# Patient Record
Sex: FEMALE | Race: WHITE | NOT HISPANIC OR LATINO | Employment: FULL TIME | URBAN - NONMETROPOLITAN AREA
[De-identification: names, ages, dates, MRNs, and addresses within clinical notes are randomized per-mention and may not be internally consistent; named-entity substitution may affect disease eponyms.]

---

## 2023-04-24 DIAGNOSIS — E87.5 HYPERKALEMIA: ICD-10-CM

## 2023-04-24 RX ORDER — POTASSIUM CHLORIDE 1.5 G/1.58G
20 POWDER, FOR SOLUTION ORAL DAILY
COMMUNITY
End: 2023-04-24 | Stop reason: SDUPTHER

## 2023-04-25 RX ORDER — POTASSIUM CHLORIDE 1.5 G/1.58G
20 POWDER, FOR SOLUTION ORAL DAILY
Qty: 30 EACH | Refills: 11 | Status: SHIPPED | OUTPATIENT
Start: 2023-04-25 | End: 2023-06-19 | Stop reason: SDUPTHER

## 2023-05-04 DIAGNOSIS — R60.0 BILATERAL LEG EDEMA: Primary | ICD-10-CM

## 2023-05-04 PROBLEM — M25.50 POLYARTHRALGIA: Status: ACTIVE | Noted: 2023-05-04

## 2023-05-04 PROBLEM — K21.9 GERD (GASTROESOPHAGEAL REFLUX DISEASE): Status: ACTIVE | Noted: 2023-05-04

## 2023-05-04 PROBLEM — E83.41 HYPERMAGNESEMIA: Status: ACTIVE | Noted: 2023-05-04

## 2023-05-04 PROBLEM — R06.02 SOB (SHORTNESS OF BREATH) ON EXERTION: Status: ACTIVE | Noted: 2023-05-04

## 2023-05-04 PROBLEM — I10 HYPERTENSION: Status: ACTIVE | Noted: 2023-05-04

## 2023-05-04 PROBLEM — G47.33 OBSTRUCTIVE SLEEP APNEA, ADULT: Status: ACTIVE | Noted: 2023-05-04

## 2023-05-04 PROBLEM — M75.41 IMPINGEMENT SYNDROME OF RIGHT SHOULDER: Status: ACTIVE | Noted: 2023-05-04

## 2023-05-04 PROBLEM — E87.6 DIURETIC-INDUCED HYPOKALEMIA: Status: ACTIVE | Noted: 2023-05-04

## 2023-05-04 PROBLEM — T50.2X5A DIURETIC-INDUCED HYPOKALEMIA: Status: ACTIVE | Noted: 2023-05-04

## 2023-05-04 PROBLEM — E78.00 HYPERCHOLESTEREMIA: Status: ACTIVE | Noted: 2023-05-04

## 2023-05-04 PROBLEM — G89.29 CHRONIC PAIN OF LEFT KNEE: Status: ACTIVE | Noted: 2023-05-04

## 2023-05-04 PROBLEM — J44.9 COPD (CHRONIC OBSTRUCTIVE PULMONARY DISEASE) (MULTI): Status: ACTIVE | Noted: 2023-05-04

## 2023-05-04 PROBLEM — R91.1 LUNG NODULE: Status: ACTIVE | Noted: 2023-05-04

## 2023-05-04 PROBLEM — M19.042 DEGENERATIVE ARTHRITIS OF METACARPOPHALANGEAL JOINT OF LEFT THUMB: Status: ACTIVE | Noted: 2023-05-04

## 2023-05-04 PROBLEM — L25.9 CONTACT DERMATITIS: Status: ACTIVE | Noted: 2023-05-04

## 2023-05-04 PROBLEM — M25.562 CHRONIC PAIN OF LEFT KNEE: Status: ACTIVE | Noted: 2023-05-04

## 2023-05-04 PROBLEM — M19.049 CMC ARTHRITIS: Status: ACTIVE | Noted: 2023-05-04

## 2023-05-04 PROBLEM — M77.00 MEDIAL EPICONDYLITIS: Status: ACTIVE | Noted: 2023-05-04

## 2023-05-04 PROBLEM — E87.5 HYPERKALEMIA: Status: ACTIVE | Noted: 2023-05-04

## 2023-05-04 RX ORDER — TRIAMTERENE/HYDROCHLOROTHIAZID 37.5-25 MG
1 TABLET ORAL DAILY
COMMUNITY
Start: 2019-11-21 | End: 2023-05-04 | Stop reason: SDUPTHER

## 2023-05-04 RX ORDER — TRIAMTERENE/HYDROCHLOROTHIAZID 37.5-25 MG
1 TABLET ORAL DAILY
Qty: 90 TABLET | Refills: 0 | Status: SHIPPED | OUTPATIENT
Start: 2023-05-04 | End: 2023-06-19 | Stop reason: SDUPTHER

## 2023-05-11 RX ORDER — BUDESONIDE, GLYCOPYRROLATE, AND FORMOTEROL FUMARATE 160; 9; 4.8 UG/1; UG/1; UG/1
2 AEROSOL, METERED RESPIRATORY (INHALATION) 2 TIMES DAILY
COMMUNITY
Start: 2023-04-24 | End: 2023-10-27 | Stop reason: SDUPTHER

## 2023-05-11 RX ORDER — OMEPRAZOLE 20 MG/1
1 TABLET, DELAYED RELEASE ORAL DAILY
COMMUNITY
Start: 2019-11-21

## 2023-05-11 RX ORDER — CHOLECALCIFEROL (VITAMIN D3) 25 MCG
1 TABLET ORAL DAILY
COMMUNITY

## 2023-05-11 RX ORDER — NAPROXEN SODIUM 220 MG/1
81 TABLET, FILM COATED ORAL DAILY
COMMUNITY
Start: 2023-01-12 | End: 2023-06-19 | Stop reason: SDUPTHER

## 2023-05-11 RX ORDER — ALBUTEROL SULFATE 90 UG/1
2 INHALANT RESPIRATORY (INHALATION) EVERY 6 HOURS PRN
COMMUNITY
Start: 2021-10-19

## 2023-05-11 RX ORDER — METOPROLOL SUCCINATE 25 MG/1
1 TABLET, EXTENDED RELEASE ORAL DAILY
COMMUNITY
Start: 2021-09-07 | End: 2023-05-18 | Stop reason: SDUPTHER

## 2023-05-11 RX ORDER — LOSARTAN POTASSIUM 25 MG/1
1 TABLET ORAL DAILY
COMMUNITY
Start: 2021-11-10 | End: 2023-05-18 | Stop reason: SDUPTHER

## 2023-05-11 RX ORDER — ISOSORBIDE MONONITRATE 30 MG/1
1 TABLET, EXTENDED RELEASE ORAL DAILY
COMMUNITY
Start: 2023-04-18 | End: 2024-04-30 | Stop reason: SDUPTHER

## 2023-05-12 LAB
ALANINE AMINOTRANSFERASE (SGPT) (U/L) IN SER/PLAS: 24 U/L (ref 7–45)
ALBUMIN (G/DL) IN SER/PLAS: 3.6 G/DL (ref 3.4–5)
ALKALINE PHOSPHATASE (U/L) IN SER/PLAS: 95 U/L (ref 33–136)
ANION GAP IN SER/PLAS: 9 MMOL/L (ref 10–20)
ASPARTATE AMINOTRANSFERASE (SGOT) (U/L) IN SER/PLAS: 25 U/L (ref 9–39)
BASOPHILS (10*3/UL) IN BLOOD BY AUTOMATED COUNT: 0.03 X10E9/L (ref 0–0.1)
BASOPHILS/100 LEUKOCYTES IN BLOOD BY AUTOMATED COUNT: 0.6 % (ref 0–2)
BILIRUBIN TOTAL (MG/DL) IN SER/PLAS: 0.4 MG/DL (ref 0–1.2)
CALCIUM (MG/DL) IN SER/PLAS: 9.4 MG/DL (ref 8.6–10.3)
CARBON DIOXIDE, TOTAL (MMOL/L) IN SER/PLAS: 30 MMOL/L (ref 21–32)
CHLORIDE (MMOL/L) IN SER/PLAS: 105 MMOL/L (ref 98–107)
CHOLESTEROL (MG/DL) IN SER/PLAS: 163 MG/DL (ref 0–199)
CHOLESTEROL IN HDL (MG/DL) IN SER/PLAS: 70 MG/DL
CHOLESTEROL/HDL RATIO: 2.3
CREATININE (MG/DL) IN SER/PLAS: 0.94 MG/DL (ref 0.5–1.05)
EOSINOPHILS (10*3/UL) IN BLOOD BY AUTOMATED COUNT: 0.17 X10E9/L (ref 0–0.7)
EOSINOPHILS/100 LEUKOCYTES IN BLOOD BY AUTOMATED COUNT: 3.5 % (ref 0–6)
ERYTHROCYTE DISTRIBUTION WIDTH (RATIO) BY AUTOMATED COUNT: 13.8 % (ref 11.5–14.5)
ERYTHROCYTE MEAN CORPUSCULAR HEMOGLOBIN CONCENTRATION (G/DL) BY AUTOMATED: 30.7 G/DL (ref 32–36)
ERYTHROCYTE MEAN CORPUSCULAR VOLUME (FL) BY AUTOMATED COUNT: 96 FL (ref 80–100)
ERYTHROCYTES (10*6/UL) IN BLOOD BY AUTOMATED COUNT: 4.3 X10E12/L (ref 4–5.2)
FERRITIN (UG/LL) IN SER/PLAS: 58 UG/L (ref 8–150)
GFR FEMALE: 69 ML/MIN/1.73M2
GLUCOSE (MG/DL) IN SER/PLAS: 87 MG/DL (ref 74–99)
HEMATOCRIT (%) IN BLOOD BY AUTOMATED COUNT: 41.1 % (ref 36–46)
HEMOGLOBIN (G/DL) IN BLOOD: 12.6 G/DL (ref 12–16)
IMMATURE GRANULOCYTES/100 LEUKOCYTES IN BLOOD BY AUTOMATED COUNT: 0.4 % (ref 0–0.9)
IRON (UG/DL) IN SER/PLAS: 82 UG/DL (ref 35–150)
IRON BINDING CAPACITY (UG/DL) IN SER/PLAS: 321 UG/DL (ref 240–445)
IRON SATURATION (%) IN SER/PLAS: 26 % (ref 25–45)
LDL: 84 MG/DL (ref 0–99)
LEUKOCYTES (10*3/UL) IN BLOOD BY AUTOMATED COUNT: 4.9 X10E9/L (ref 4.4–11.3)
LYMPHOCYTES (10*3/UL) IN BLOOD BY AUTOMATED COUNT: 1.08 X10E9/L (ref 1.2–4.8)
LYMPHOCYTES/100 LEUKOCYTES IN BLOOD BY AUTOMATED COUNT: 22 % (ref 13–44)
MAGNESIUM (MG/DL) IN SER/PLAS: 2.02 MG/DL (ref 1.6–2.4)
MONOCYTES (10*3/UL) IN BLOOD BY AUTOMATED COUNT: 0.45 X10E9/L (ref 0.1–1)
MONOCYTES/100 LEUKOCYTES IN BLOOD BY AUTOMATED COUNT: 9.2 % (ref 2–10)
NEUTROPHILS (10*3/UL) IN BLOOD BY AUTOMATED COUNT: 3.15 X10E9/L (ref 1.2–7.7)
NEUTROPHILS/100 LEUKOCYTES IN BLOOD BY AUTOMATED COUNT: 64.3 % (ref 40–80)
PLATELETS (10*3/UL) IN BLOOD AUTOMATED COUNT: 264 X10E9/L (ref 150–450)
POTASSIUM (MMOL/L) IN SER/PLAS: 3.7 MMOL/L (ref 3.5–5.3)
PROTEIN TOTAL: 6 G/DL (ref 6.4–8.2)
SODIUM (MMOL/L) IN SER/PLAS: 140 MMOL/L (ref 136–145)
THYROTROPIN (MIU/L) IN SER/PLAS BY DETECTION LIMIT <= 0.05 MIU/L: 3.04 MIU/L (ref 0.44–3.98)
TRIGLYCERIDE (MG/DL) IN SER/PLAS: 43 MG/DL (ref 0–149)
UREA NITROGEN (MG/DL) IN SER/PLAS: 20 MG/DL (ref 6–23)
VLDL: 9 MG/DL (ref 0–40)

## 2023-05-18 ENCOUNTER — OFFICE VISIT (OUTPATIENT)
Dept: PRIMARY CARE | Facility: CLINIC | Age: 63
End: 2023-05-18
Payer: COMMERCIAL

## 2023-05-18 VITALS
HEART RATE: 60 BPM | HEIGHT: 60 IN | BODY MASS INDEX: 32.2 KG/M2 | SYSTOLIC BLOOD PRESSURE: 144 MMHG | DIASTOLIC BLOOD PRESSURE: 87 MMHG | WEIGHT: 164 LBS

## 2023-05-18 DIAGNOSIS — Z78.0 POST-MENOPAUSAL: ICD-10-CM

## 2023-05-18 DIAGNOSIS — M54.50 CHRONIC BILATERAL LOW BACK PAIN WITHOUT SCIATICA: ICD-10-CM

## 2023-05-18 DIAGNOSIS — K21.9 GASTROESOPHAGEAL REFLUX DISEASE WITHOUT ESOPHAGITIS: ICD-10-CM

## 2023-05-18 DIAGNOSIS — M54.9 MID BACK PAIN: ICD-10-CM

## 2023-05-18 DIAGNOSIS — E78.00 HYPERCHOLESTEREMIA: ICD-10-CM

## 2023-05-18 DIAGNOSIS — I10 PRIMARY HYPERTENSION: Primary | ICD-10-CM

## 2023-05-18 DIAGNOSIS — T50.2X5A DIURETIC-INDUCED HYPOKALEMIA: ICD-10-CM

## 2023-05-18 DIAGNOSIS — E87.6 DIURETIC-INDUCED HYPOKALEMIA: ICD-10-CM

## 2023-05-18 DIAGNOSIS — M25.50 POLYARTHRALGIA: ICD-10-CM

## 2023-05-18 DIAGNOSIS — M54.2 CERVICALGIA: ICD-10-CM

## 2023-05-18 DIAGNOSIS — J43.2 CENTRILOBULAR EMPHYSEMA (MULTI): ICD-10-CM

## 2023-05-18 DIAGNOSIS — G89.29 CHRONIC BILATERAL LOW BACK PAIN WITHOUT SCIATICA: ICD-10-CM

## 2023-05-18 DIAGNOSIS — E83.41 HYPERMAGNESEMIA: ICD-10-CM

## 2023-05-18 DIAGNOSIS — Z12.31 ENCOUNTER FOR SCREENING MAMMOGRAM FOR BREAST CANCER: ICD-10-CM

## 2023-05-18 PROCEDURE — 3079F DIAST BP 80-89 MM HG: CPT | Performed by: PHYSICIAN ASSISTANT

## 2023-05-18 PROCEDURE — 99214 OFFICE O/P EST MOD 30 MIN: CPT | Performed by: PHYSICIAN ASSISTANT

## 2023-05-18 PROCEDURE — 1036F TOBACCO NON-USER: CPT | Performed by: PHYSICIAN ASSISTANT

## 2023-05-18 PROCEDURE — 3077F SYST BP >= 140 MM HG: CPT | Performed by: PHYSICIAN ASSISTANT

## 2023-05-18 RX ORDER — METOPROLOL SUCCINATE 25 MG/1
25 TABLET, EXTENDED RELEASE ORAL DAILY
Qty: 90 TABLET | Refills: 3 | Status: SHIPPED | OUTPATIENT
Start: 2023-05-18 | End: 2023-06-19 | Stop reason: SDUPTHER

## 2023-05-18 RX ORDER — LOSARTAN POTASSIUM 25 MG/1
25 TABLET ORAL DAILY
Qty: 90 TABLET | Refills: 3 | Status: SHIPPED | OUTPATIENT
Start: 2023-05-18 | End: 2023-06-19 | Stop reason: SDUPTHER

## 2023-05-18 ASSESSMENT — PATIENT HEALTH QUESTIONNAIRE - PHQ9
SUM OF ALL RESPONSES TO PHQ9 QUESTIONS 1 AND 2: 0
1. LITTLE INTEREST OR PLEASURE IN DOING THINGS: NOT AT ALL
2. FEELING DOWN, DEPRESSED OR HOPELESS: NOT AT ALL

## 2023-05-18 ASSESSMENT — ENCOUNTER SYMPTOMS
CONSTIPATION: 0
DIZZINESS: 0
CHILLS: 0
ARTHRALGIAS: 1
CHEST TIGHTNESS: 0
DIARRHEA: 0
NUMBNESS: 0
FLANK PAIN: 0
NAUSEA: 0
RHINORRHEA: 0
FATIGUE: 1
BRUISES/BLEEDS EASILY: 0
COUGH: 0
HEADACHES: 0
EYE REDNESS: 0
NECK PAIN: 0
FEVER: 0
WOUND: 0
BACK PAIN: 1
EYE DISCHARGE: 0
PALPITATIONS: 0
SLEEP DISTURBANCE: 0
FREQUENCY: 0
ABDOMINAL PAIN: 0
WHEEZING: 0
CONFUSION: 0
SHORTNESS OF BREATH: 0
VOMITING: 0
TREMORS: 0
SINUS PAIN: 0
SORE THROAT: 0
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
## 2023-06-07 ENCOUNTER — OFFICE VISIT (OUTPATIENT)
Dept: PRIMARY CARE | Facility: CLINIC | Age: 63
End: 2023-06-07
Payer: COMMERCIAL

## 2023-06-07 VITALS
WEIGHT: 165 LBS | BODY MASS INDEX: 32.39 KG/M2 | HEART RATE: 64 BPM | HEIGHT: 60 IN | DIASTOLIC BLOOD PRESSURE: 76 MMHG | SYSTOLIC BLOOD PRESSURE: 138 MMHG

## 2023-06-07 DIAGNOSIS — J43.2 CENTRILOBULAR EMPHYSEMA (MULTI): ICD-10-CM

## 2023-06-07 DIAGNOSIS — M54.2 CERVICALGIA: ICD-10-CM

## 2023-06-07 DIAGNOSIS — M54.9 MID BACK PAIN: ICD-10-CM

## 2023-06-07 DIAGNOSIS — M50.30 DEGENERATIVE DISC DISEASE, CERVICAL: ICD-10-CM

## 2023-06-07 DIAGNOSIS — R91.1 LUNG NODULE: ICD-10-CM

## 2023-06-07 DIAGNOSIS — I10 PRIMARY HYPERTENSION: ICD-10-CM

## 2023-06-07 DIAGNOSIS — R06.02 SOB (SHORTNESS OF BREATH) ON EXERTION: ICD-10-CM

## 2023-06-07 DIAGNOSIS — M51.34 DEGENERATIVE DISC DISEASE, THORACIC: ICD-10-CM

## 2023-06-07 DIAGNOSIS — M51.37 DEGENERATIVE DISC DISEASE AT L5-S1 LEVEL: Primary | ICD-10-CM

## 2023-06-07 PROBLEM — R25.2 MUSCLE CRAMP: Status: ACTIVE | Noted: 2023-06-07

## 2023-06-07 PROBLEM — R00.2 HEART PALPITATIONS: Status: ACTIVE | Noted: 2023-06-07

## 2023-06-07 PROBLEM — R53.83 FATIGUE: Status: ACTIVE | Noted: 2023-06-07

## 2023-06-07 PROBLEM — M51.379 DEGENERATIVE DISC DISEASE AT L5-S1 LEVEL: Status: ACTIVE | Noted: 2023-06-07

## 2023-06-07 PROCEDURE — 99214 OFFICE O/P EST MOD 30 MIN: CPT | Performed by: PHYSICIAN ASSISTANT

## 2023-06-07 PROCEDURE — 1036F TOBACCO NON-USER: CPT | Performed by: PHYSICIAN ASSISTANT

## 2023-06-07 PROCEDURE — 3078F DIAST BP <80 MM HG: CPT | Performed by: PHYSICIAN ASSISTANT

## 2023-06-07 PROCEDURE — 3075F SYST BP GE 130 - 139MM HG: CPT | Performed by: PHYSICIAN ASSISTANT

## 2023-06-07 RX ORDER — ATORVASTATIN CALCIUM 40 MG/1
40 TABLET, FILM COATED ORAL DAILY
COMMUNITY
End: 2024-01-04 | Stop reason: SDUPTHER

## 2023-06-07 ASSESSMENT — ENCOUNTER SYMPTOMS
FEVER: 0
EYE REDNESS: 0
BRUISES/BLEEDS EASILY: 0
NECK PAIN: 0
WHEEZING: 0
PALPITATIONS: 0
TREMORS: 0
HEADACHES: 0
SHORTNESS OF BREATH: 1
CHEST TIGHTNESS: 1
NUMBNESS: 0
ARTHRALGIAS: 1
NAUSEA: 0
CONSTIPATION: 0
VOMITING: 0
SORE THROAT: 0
DIZZINESS: 0
SLEEP DISTURBANCE: 0
CHILLS: 0
WOUND: 0
FATIGUE: 1
EYE DISCHARGE: 0
CONFUSION: 0
SINUS PAIN: 0
ABDOMINAL PAIN: 0
COUGH: 0
DIARRHEA: 0
BACK PAIN: 1
FLANK PAIN: 0
RHINORRHEA: 0
FREQUENCY: 0
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
## 2023-06-19 DIAGNOSIS — J43.9 PULMONARY EMPHYSEMA, UNSPECIFIED EMPHYSEMA TYPE (MULTI): Primary | ICD-10-CM

## 2023-06-19 DIAGNOSIS — R60.0 BILATERAL LEG EDEMA: ICD-10-CM

## 2023-06-19 DIAGNOSIS — E87.5 HYPERKALEMIA: ICD-10-CM

## 2023-06-19 DIAGNOSIS — I10 PRIMARY HYPERTENSION: ICD-10-CM

## 2023-06-19 DIAGNOSIS — Z00.00 HEALTHCARE MAINTENANCE: ICD-10-CM

## 2023-06-19 RX ORDER — TRIAMTERENE/HYDROCHLOROTHIAZID 37.5-25 MG
1 TABLET ORAL DAILY
Qty: 90 TABLET | Refills: 3 | Status: SHIPPED | OUTPATIENT
Start: 2023-06-19 | End: 2024-03-18

## 2023-06-19 RX ORDER — LOSARTAN POTASSIUM 25 MG/1
25 TABLET ORAL DAILY
Qty: 90 TABLET | Refills: 3 | Status: SHIPPED | OUTPATIENT
Start: 2023-06-19 | End: 2024-03-14

## 2023-06-19 RX ORDER — MONTELUKAST SODIUM 10 MG/1
10 TABLET ORAL NIGHTLY
COMMUNITY
Start: 2023-06-13

## 2023-06-19 RX ORDER — METOPROLOL SUCCINATE 25 MG/1
25 TABLET, EXTENDED RELEASE ORAL DAILY
Qty: 90 TABLET | Refills: 3 | Status: SHIPPED | OUTPATIENT
Start: 2023-06-19 | End: 2024-03-14

## 2023-06-19 RX ORDER — NAPROXEN SODIUM 220 MG/1
81 TABLET, FILM COATED ORAL DAILY
Qty: 90 TABLET | Refills: 3 | Status: SHIPPED | OUTPATIENT
Start: 2023-06-19 | End: 2024-03-18

## 2023-06-19 RX ORDER — BUDESONIDE, GLYCOPYRROLATE, AND FORMOTEROL FUMARATE 160; 9; 4.8 UG/1; UG/1; UG/1
2 AEROSOL, METERED RESPIRATORY (INHALATION) 2 TIMES DAILY
OUTPATIENT
Start: 2023-06-19

## 2023-06-19 RX ORDER — POTASSIUM CHLORIDE 1.5 G/1.58G
20 POWDER, FOR SOLUTION ORAL DAILY
Qty: 90 EACH | Refills: 3 | Status: SHIPPED | OUTPATIENT
Start: 2023-06-19 | End: 2024-03-18

## 2023-08-03 ENCOUNTER — TELEPHONE (OUTPATIENT)
Dept: PRIMARY CARE | Facility: CLINIC | Age: 63
End: 2023-08-03
Payer: COMMERCIAL

## 2023-08-03 LAB — SARS-COV-2 RESULT: NOT DETECTED

## 2023-08-03 NOTE — TELEPHONE ENCOUNTER
Congested, coughing, SOB. Light headed, loss of balance. Been going on for about a week. Patient has used some nasal spray. Eyes are blood shot. She would like some advice as to what you would recommend?

## 2023-10-27 ENCOUNTER — LAB (OUTPATIENT)
Dept: LAB | Facility: LAB | Age: 63
End: 2023-10-27
Payer: COMMERCIAL

## 2023-10-27 ENCOUNTER — OFFICE VISIT (OUTPATIENT)
Dept: PULMONOLOGY | Facility: CLINIC | Age: 63
End: 2023-10-27
Payer: COMMERCIAL

## 2023-10-27 ENCOUNTER — APPOINTMENT (OUTPATIENT)
Dept: RESPIRATORY THERAPY | Facility: HOSPITAL | Age: 63
End: 2023-10-27
Payer: COMMERCIAL

## 2023-10-27 VITALS
OXYGEN SATURATION: 96 % | HEIGHT: 60 IN | DIASTOLIC BLOOD PRESSURE: 82 MMHG | TEMPERATURE: 97.1 F | WEIGHT: 161.6 LBS | BODY MASS INDEX: 31.73 KG/M2 | HEART RATE: 71 BPM | SYSTOLIC BLOOD PRESSURE: 132 MMHG

## 2023-10-27 DIAGNOSIS — R06.2 WHEEZING ON EXHALATION: ICD-10-CM

## 2023-10-27 DIAGNOSIS — J42 CHRONIC BRONCHITIS, UNSPECIFIED CHRONIC BRONCHITIS TYPE (MULTI): Primary | ICD-10-CM

## 2023-10-27 DIAGNOSIS — E78.00 HYPERCHOLESTEREMIA: ICD-10-CM

## 2023-10-27 DIAGNOSIS — E83.41 HYPERMAGNESEMIA: ICD-10-CM

## 2023-10-27 LAB
ALBUMIN SERPL BCP-MCNC: 3.7 G/DL (ref 3.4–5)
ALP SERPL-CCNC: 95 U/L (ref 33–136)
ALT SERPL W P-5'-P-CCNC: 20 U/L (ref 7–45)
ANION GAP SERPL CALC-SCNC: 9 MMOL/L (ref 10–20)
AST SERPL W P-5'-P-CCNC: 22 U/L (ref 9–39)
BASOPHILS # BLD AUTO: 0.03 X10*3/UL (ref 0–0.1)
BASOPHILS NFR BLD AUTO: 0.6 %
BILIRUB SERPL-MCNC: 0.6 MG/DL (ref 0–1.2)
BUN SERPL-MCNC: 26 MG/DL (ref 6–23)
CALCIUM SERPL-MCNC: 9 MG/DL (ref 8.6–10.3)
CHLORIDE SERPL-SCNC: 103 MMOL/L (ref 98–107)
CHOLEST SERPL-MCNC: 157 MG/DL (ref 0–199)
CHOLESTEROL/HDL RATIO: 2.5
CO2 SERPL-SCNC: 30 MMOL/L (ref 21–32)
CREAT SERPL-MCNC: 0.89 MG/DL (ref 0.5–1.05)
EOSINOPHIL # BLD AUTO: 0.17 X10*3/UL (ref 0–0.7)
EOSINOPHIL NFR BLD AUTO: 3.6 %
ERYTHROCYTE [DISTWIDTH] IN BLOOD BY AUTOMATED COUNT: 13.9 % (ref 11.5–14.5)
GFR SERPL CREATININE-BSD FRML MDRD: 74 ML/MIN/1.73M*2
GLUCOSE SERPL-MCNC: 89 MG/DL (ref 74–99)
HCT VFR BLD AUTO: 39.4 % (ref 36–46)
HDLC SERPL-MCNC: 64 MG/DL
HGB BLD-MCNC: 12.4 G/DL (ref 12–16)
IMM GRANULOCYTES # BLD AUTO: 0.01 X10*3/UL (ref 0–0.7)
IMM GRANULOCYTES NFR BLD AUTO: 0.2 % (ref 0–0.9)
LDLC SERPL CALC-MCNC: 83 MG/DL
LYMPHOCYTES # BLD AUTO: 1.15 X10*3/UL (ref 1.2–4.8)
LYMPHOCYTES NFR BLD AUTO: 24.7 %
MAGNESIUM SERPL-MCNC: 1.96 MG/DL (ref 1.6–2.4)
MCH RBC QN AUTO: 30.1 PG (ref 26–34)
MCHC RBC AUTO-ENTMCNC: 31.5 G/DL (ref 32–36)
MCV RBC AUTO: 96 FL (ref 80–100)
MONOCYTES # BLD AUTO: 0.48 X10*3/UL (ref 0.1–1)
MONOCYTES NFR BLD AUTO: 10.3 %
NEUTROPHILS # BLD AUTO: 2.82 X10*3/UL (ref 1.2–7.7)
NEUTROPHILS NFR BLD AUTO: 60.6 %
NON HDL CHOLESTEROL: 93 MG/DL (ref 0–149)
NRBC BLD-RTO: 0 /100 WBCS (ref 0–0)
PLATELET # BLD AUTO: 260 X10*3/UL (ref 150–450)
PMV BLD AUTO: 10.5 FL (ref 7.5–11.5)
POTASSIUM SERPL-SCNC: 3.3 MMOL/L (ref 3.5–5.3)
PROT SERPL-MCNC: 6.2 G/DL (ref 6.4–8.2)
RBC # BLD AUTO: 4.12 X10*6/UL (ref 4–5.2)
SODIUM SERPL-SCNC: 139 MMOL/L (ref 136–145)
TRIGL SERPL-MCNC: 48 MG/DL (ref 0–149)
VIT B12 SERPL-MCNC: 277 PG/ML (ref 211–911)
VLDL: 10 MG/DL (ref 0–40)
WBC # BLD AUTO: 4.7 X10*3/UL (ref 4.4–11.3)

## 2023-10-27 PROCEDURE — 83735 ASSAY OF MAGNESIUM: CPT

## 2023-10-27 PROCEDURE — 99213 OFFICE O/P EST LOW 20 MIN: CPT | Performed by: INTERNAL MEDICINE

## 2023-10-27 PROCEDURE — 36415 COLL VENOUS BLD VENIPUNCTURE: CPT

## 2023-10-27 PROCEDURE — 3075F SYST BP GE 130 - 139MM HG: CPT | Performed by: INTERNAL MEDICINE

## 2023-10-27 PROCEDURE — 80053 COMPREHEN METABOLIC PANEL: CPT

## 2023-10-27 PROCEDURE — 1036F TOBACCO NON-USER: CPT | Performed by: INTERNAL MEDICINE

## 2023-10-27 PROCEDURE — 82607 VITAMIN B-12: CPT

## 2023-10-27 PROCEDURE — 80061 LIPID PANEL: CPT

## 2023-10-27 PROCEDURE — 3079F DIAST BP 80-89 MM HG: CPT | Performed by: INTERNAL MEDICINE

## 2023-10-27 PROCEDURE — 85025 COMPLETE CBC W/AUTO DIFF WBC: CPT

## 2023-10-27 RX ORDER — BUDESONIDE, GLYCOPYRROLATE, AND FORMOTEROL FUMARATE 160; 9; 4.8 UG/1; UG/1; UG/1
2 AEROSOL, METERED RESPIRATORY (INHALATION)
Qty: 72 G | Refills: 3 | Status: CANCELLED | OUTPATIENT
Start: 2023-10-27 | End: 2024-01-25

## 2023-10-27 NOTE — PROGRESS NOTES
Subjective   Patient ID: Gallo Schmidt is a 63 y.o. female who presents for Bronchitis (CHRONIC /6 WEEK CK).  HPI  Patient was seen today in the office on a 6-week follow-up visit for her chronic bronchitis.  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  She does however continue to wear a mask in those areas where there are machine still operating.  She is on Breztri at 2 and elations twice a day and short acting beta agonist 2 puffs 4 times daily as needed shortness of breath.  Patient denies any cough or sputum production.  She is intermittently wheezing on exhalation.  She denies any significant dyspnea with usual daily activities except when the level is increased such as going up stairs or an incline.  Review of Systems  Patient denies having any fever, chills, rhinitis or sore throat and she is not smoking.  All other review of systems were unremarkable.  Refer to the HPI.  Objective   Physical Exam  HEENT, examination of the oropharynx did not show any evidence of inflammation.  Pulmonary, she has equal breath sounds bilaterally with some scattered end expiratory wheezes.  Cardio, heart sounds were regular rate and rhythm.  Abdomen, bowel sounds were heard in all quadrants.  Extremities, no cyanosis, clubbing or pretibial edema.  Psych, the patient is alert and oriented x3.  Assessment/Plan     Impressions:  1.  Moderate chronic bronchitis.  2.  End expiratory wheezing noted.  Recommendations:  1.  We can arrange for her to have a flutter valve to help her clear her airway secretions from the respiratory department at [facility].  2.  She should use the flutter valve 2-4 times per day with any congestion.  3.  I encouraged her once again to use a mask in the workplace.  4.  Follow-up with the patient in 4 months.      This note was transcribed using the Dragon Dictation system.  There  may be grammatical, punctuation, or verbiage errors that occur with voice recognition programs.

## 2023-10-30 ENCOUNTER — HOSPITAL ENCOUNTER (OUTPATIENT)
Dept: RESPIRATORY THERAPY | Facility: HOSPITAL | Age: 63
Discharge: HOME | End: 2023-10-30
Payer: COMMERCIAL

## 2023-10-30 PROCEDURE — 9420000001 HC RT PATIENT EDUCATION 5 MIN

## 2023-10-30 PROCEDURE — 94664 DEMO&/EVAL PT USE INHALER: CPT

## 2023-11-01 RX ORDER — BUDESONIDE, GLYCOPYRROLATE, AND FORMOTEROL FUMARATE 160; 9; 4.8 UG/1; UG/1; UG/1
AEROSOL, METERED RESPIRATORY (INHALATION)
Qty: 10.7 G | Refills: 1 | Status: SHIPPED | OUTPATIENT
Start: 2023-11-01 | End: 2024-02-28 | Stop reason: SDUPTHER

## 2023-11-14 ENCOUNTER — APPOINTMENT (OUTPATIENT)
Dept: RADIOLOGY | Facility: HOSPITAL | Age: 63
End: 2023-11-14
Payer: COMMERCIAL

## 2023-11-14 ENCOUNTER — HOSPITAL ENCOUNTER (EMERGENCY)
Facility: HOSPITAL | Age: 63
Discharge: HOME | End: 2023-11-14
Payer: COMMERCIAL

## 2023-11-14 VITALS
SYSTOLIC BLOOD PRESSURE: 153 MMHG | HEART RATE: 84 BPM | HEIGHT: 60 IN | OXYGEN SATURATION: 98 % | RESPIRATION RATE: 16 BRPM | BODY MASS INDEX: 31.41 KG/M2 | WEIGHT: 160 LBS | DIASTOLIC BLOOD PRESSURE: 85 MMHG

## 2023-11-14 DIAGNOSIS — R10.31 RIGHT LOWER QUADRANT ABDOMINAL PAIN: ICD-10-CM

## 2023-11-14 DIAGNOSIS — N30.00 ACUTE CYSTITIS WITHOUT HEMATURIA: Primary | ICD-10-CM

## 2023-11-14 DIAGNOSIS — N32.1 COLOVESICAL FISTULA: ICD-10-CM

## 2023-11-14 LAB
ALBUMIN SERPL BCP-MCNC: 3.8 G/DL (ref 3.4–5)
ALP SERPL-CCNC: 107 U/L (ref 33–136)
ALT SERPL W P-5'-P-CCNC: 21 U/L (ref 7–45)
ANION GAP SERPL CALC-SCNC: 10 MMOL/L (ref 10–20)
APPEARANCE UR: ABNORMAL
AST SERPL W P-5'-P-CCNC: 24 U/L (ref 9–39)
BACTERIA #/AREA URNS AUTO: ABNORMAL /HPF
BASOPHILS # BLD AUTO: 0.04 X10*3/UL (ref 0–0.1)
BASOPHILS NFR BLD AUTO: 0.6 %
BILIRUB DIRECT SERPL-MCNC: 0 MG/DL (ref 0–0.3)
BILIRUB SERPL-MCNC: 0.4 MG/DL (ref 0–1.2)
BILIRUB UR STRIP.AUTO-MCNC: NEGATIVE MG/DL
BUN SERPL-MCNC: 19 MG/DL (ref 6–23)
CALCIUM SERPL-MCNC: 9.5 MG/DL (ref 8.6–10.3)
CHLORIDE SERPL-SCNC: 103 MMOL/L (ref 98–107)
CO2 SERPL-SCNC: 32 MMOL/L (ref 21–32)
COLOR UR: YELLOW
CREAT SERPL-MCNC: 0.78 MG/DL (ref 0.5–1.05)
EOSINOPHIL # BLD AUTO: 0.25 X10*3/UL (ref 0–0.7)
EOSINOPHIL NFR BLD AUTO: 3.6 %
ERYTHROCYTE [DISTWIDTH] IN BLOOD BY AUTOMATED COUNT: 13.7 % (ref 11.5–14.5)
GFR SERPL CREATININE-BSD FRML MDRD: 87 ML/MIN/1.73M*2
GLUCOSE SERPL-MCNC: 93 MG/DL (ref 74–99)
GLUCOSE UR STRIP.AUTO-MCNC: NEGATIVE MG/DL
HCT VFR BLD AUTO: 41.6 % (ref 36–46)
HGB BLD-MCNC: 13.4 G/DL (ref 12–16)
HOLD SPECIMEN: NORMAL
IMM GRANULOCYTES # BLD AUTO: 0.02 X10*3/UL (ref 0–0.7)
IMM GRANULOCYTES NFR BLD AUTO: 0.3 % (ref 0–0.9)
KETONES UR STRIP.AUTO-MCNC: NEGATIVE MG/DL
LACTATE SERPL-SCNC: 0.7 MMOL/L (ref 0.4–2)
LEUKOCYTE ESTERASE UR QL STRIP.AUTO: ABNORMAL
LIPASE SERPL-CCNC: 59 U/L (ref 9–82)
LYMPHOCYTES # BLD AUTO: 1.16 X10*3/UL (ref 1.2–4.8)
LYMPHOCYTES NFR BLD AUTO: 16.5 %
MCH RBC QN AUTO: 30.9 PG (ref 26–34)
MCHC RBC AUTO-ENTMCNC: 32.2 G/DL (ref 32–36)
MCV RBC AUTO: 96 FL (ref 80–100)
MONOCYTES # BLD AUTO: 0.47 X10*3/UL (ref 0.1–1)
MONOCYTES NFR BLD AUTO: 6.7 %
NEUTROPHILS # BLD AUTO: 5.07 X10*3/UL (ref 1.2–7.7)
NEUTROPHILS NFR BLD AUTO: 72.3 %
NITRITE UR QL STRIP.AUTO: NEGATIVE
NRBC BLD-RTO: 0 /100 WBCS (ref 0–0)
PH UR STRIP.AUTO: 6 [PH]
PLATELET # BLD AUTO: 275 X10*3/UL (ref 150–450)
POTASSIUM SERPL-SCNC: 4.5 MMOL/L (ref 3.5–5.3)
PROT SERPL-MCNC: 6.7 G/DL (ref 6.4–8.2)
PROT UR STRIP.AUTO-MCNC: NEGATIVE MG/DL
RBC # BLD AUTO: 4.34 X10*6/UL (ref 4–5.2)
RBC # UR STRIP.AUTO: ABNORMAL /UL
RBC #/AREA URNS AUTO: ABNORMAL /HPF
SODIUM SERPL-SCNC: 140 MMOL/L (ref 136–145)
SP GR UR STRIP.AUTO: 1.01
UROBILINOGEN UR STRIP.AUTO-MCNC: <2 MG/DL
WBC # BLD AUTO: 7 X10*3/UL (ref 4.4–11.3)
WBC #/AREA URNS AUTO: >50 /HPF
WBC CLUMPS #/AREA URNS AUTO: ABNORMAL /HPF

## 2023-11-14 PROCEDURE — 83690 ASSAY OF LIPASE: CPT | Performed by: PHYSICIAN ASSISTANT

## 2023-11-14 PROCEDURE — 87040 BLOOD CULTURE FOR BACTERIA: CPT | Mod: SAMLAB | Performed by: PHYSICIAN ASSISTANT

## 2023-11-14 PROCEDURE — 87075 CULTR BACTERIA EXCEPT BLOOD: CPT | Mod: SAMLAB | Performed by: PHYSICIAN ASSISTANT

## 2023-11-14 PROCEDURE — 83605 ASSAY OF LACTIC ACID: CPT | Performed by: PHYSICIAN ASSISTANT

## 2023-11-14 PROCEDURE — 2550000001 HC RX 255 CONTRASTS: Performed by: PHYSICIAN ASSISTANT

## 2023-11-14 PROCEDURE — 99285 EMERGENCY DEPT VISIT HI MDM: CPT | Mod: 25

## 2023-11-14 PROCEDURE — 82374 ASSAY BLOOD CARBON DIOXIDE: CPT | Performed by: PHYSICIAN ASSISTANT

## 2023-11-14 PROCEDURE — 87086 URINE CULTURE/COLONY COUNT: CPT | Mod: SAMLAB | Performed by: PHYSICIAN ASSISTANT

## 2023-11-14 PROCEDURE — 80076 HEPATIC FUNCTION PANEL: CPT | Performed by: PHYSICIAN ASSISTANT

## 2023-11-14 PROCEDURE — 96365 THER/PROPH/DIAG IV INF INIT: CPT

## 2023-11-14 PROCEDURE — 81001 URINALYSIS AUTO W/SCOPE: CPT | Performed by: PHYSICIAN ASSISTANT

## 2023-11-14 PROCEDURE — 36415 COLL VENOUS BLD VENIPUNCTURE: CPT | Performed by: PHYSICIAN ASSISTANT

## 2023-11-14 PROCEDURE — 99284 EMERGENCY DEPT VISIT MOD MDM: CPT | Mod: 25

## 2023-11-14 PROCEDURE — 74177 CT ABD & PELVIS W/CONTRAST: CPT

## 2023-11-14 PROCEDURE — 2500000004 HC RX 250 GENERAL PHARMACY W/ HCPCS (ALT 636 FOR OP/ED): Performed by: PHYSICIAN ASSISTANT

## 2023-11-14 PROCEDURE — 74177 CT ABD & PELVIS W/CONTRAST: CPT | Performed by: STUDENT IN AN ORGANIZED HEALTH CARE EDUCATION/TRAINING PROGRAM

## 2023-11-14 PROCEDURE — 85025 COMPLETE CBC W/AUTO DIFF WBC: CPT | Performed by: PHYSICIAN ASSISTANT

## 2023-11-14 PROCEDURE — 96361 HYDRATE IV INFUSION ADD-ON: CPT

## 2023-11-14 RX ORDER — CIPROFLOXACIN 500 MG/1
500 TABLET ORAL 2 TIMES DAILY
Qty: 20 TABLET | Refills: 0 | Status: SHIPPED | OUTPATIENT
Start: 2023-11-14 | End: 2023-11-24

## 2023-11-14 RX ORDER — CEFTRIAXONE 2 G/50ML
2 INJECTION, SOLUTION INTRAVENOUS ONCE
Status: COMPLETED | OUTPATIENT
Start: 2023-11-14 | End: 2023-11-14

## 2023-11-14 RX ORDER — CEFTRIAXONE 1 G/50ML
1 INJECTION, SOLUTION INTRAVENOUS ONCE
Status: DISCONTINUED | OUTPATIENT
Start: 2023-11-14 | End: 2023-11-14

## 2023-11-14 RX ADMIN — SODIUM CHLORIDE 500 ML: 9 INJECTION, SOLUTION INTRAVENOUS at 15:30

## 2023-11-14 RX ADMIN — IOHEXOL 65 ML: 350 INJECTION, SOLUTION INTRAVENOUS at 16:33

## 2023-11-14 RX ADMIN — CEFTRIAXONE SODIUM 2 G: 2 INJECTION, SOLUTION INTRAVENOUS at 18:37

## 2023-11-14 ASSESSMENT — ENCOUNTER SYMPTOMS
DYSURIA: 0
SEIZURES: 0
ABDOMINAL PAIN: 0
ARTHRALGIAS: 0
COLOR CHANGE: 0
SORE THROAT: 0
PALPITATIONS: 0
BACK PAIN: 0
VOMITING: 0
EYE PAIN: 0
CHILLS: 0
HEMATURIA: 0
SHORTNESS OF BREATH: 0
FEVER: 0
COUGH: 0

## 2023-11-14 ASSESSMENT — PAIN SCALES - GENERAL: PAINLEVEL_OUTOF10: 5 - MODERATE PAIN

## 2023-11-14 ASSESSMENT — COLUMBIA-SUICIDE SEVERITY RATING SCALE - C-SSRS
1. IN THE PAST MONTH, HAVE YOU WISHED YOU WERE DEAD OR WISHED YOU COULD GO TO SLEEP AND NOT WAKE UP?: NO
2. HAVE YOU ACTUALLY HAD ANY THOUGHTS OF KILLING YOURSELF?: NO
6. HAVE YOU EVER DONE ANYTHING, STARTED TO DO ANYTHING, OR PREPARED TO DO ANYTHING TO END YOUR LIFE?: NO

## 2023-11-14 ASSESSMENT — PAIN DESCRIPTION - LOCATION: LOCATION: ABDOMEN

## 2023-11-14 ASSESSMENT — PAIN - FUNCTIONAL ASSESSMENT: PAIN_FUNCTIONAL_ASSESSMENT: 0-10

## 2023-11-14 NOTE — Clinical Note
Gallo Schmidt was seen and treated in our emergency department on 11/14/2023.  She may return to work on 11/17/2023.       If you have any questions or concerns, please don't hesitate to call.      Tanya Pizarro PA-C

## 2023-11-14 NOTE — ED PROVIDER NOTES
Patient is a 63-year-old female who presents to the emergency department with a chief complaint of abdominal pain.  She states that she has a right lower abdominal pain that sometimes radiates upward.  She states onset was about 4 weeks ago.  She states that the pain fluctuates in intensity.  It is noted  She states that she some brown discharge sometimes while urinating.  She feels that it is coming from her urethra and not her vagina.  She denies any vaginal bleeding.  She states that she has had her right ovary removed but still has her uterus.  She denies any fever or chills.  No chest pain or shortness of breath.  No nausea or vomiting.  Patient states that she has noticed her urine somewhat cloudy in nature.  She states that sometimes eating or drinking makes the pain worse but overall cannot contribute worsening symptoms. Patient still has her gallbladder and appendix. She has a history of diverticulitis           Review of Systems   Constitutional:  Negative for chills and fever.   HENT:  Negative for ear pain and sore throat.    Eyes:  Negative for pain and visual disturbance.   Respiratory:  Negative for cough and shortness of breath.    Cardiovascular:  Negative for chest pain and palpitations.   Gastrointestinal:  Negative for abdominal pain and vomiting.   Genitourinary:  Negative for dysuria, hematuria, urgency, vaginal bleeding and vaginal discharge.   Musculoskeletal:  Negative for arthralgias and back pain.   Skin:  Negative for color change and rash.   Neurological:  Negative for seizures and syncope.   All other systems reviewed and are negative.       Physical Exam  Vitals and nursing note reviewed.   Constitutional:       General: She is not in acute distress.     Appearance: She is well-developed.   HENT:      Head: Normocephalic and atraumatic.      Mouth/Throat:      Mouth: Mucous membranes are moist.   Eyes:      Conjunctiva/sclera: Conjunctivae normal.   Cardiovascular:      Rate and  Rhythm: Normal rate and regular rhythm.      Heart sounds: No murmur heard.  Pulmonary:      Effort: Pulmonary effort is normal. No respiratory distress.      Breath sounds: Normal breath sounds.   Abdominal:      Palpations: Abdomen is soft.      Tenderness: There is abdominal tenderness in the right upper quadrant, right lower quadrant and epigastric area. There is no guarding or rebound. Negative signs include Melissa's sign, Rovsing's sign, McBurney's sign, psoas sign and obturator sign.      Hernia: No hernia is present.   Musculoskeletal:         General: No swelling.      Cervical back: Neck supple.   Skin:     General: Skin is warm and dry.      Capillary Refill: Capillary refill takes less than 2 seconds.   Neurological:      Mental Status: She is alert.   Psychiatric:         Mood and Affect: Mood normal.          Labs Reviewed   CBC WITH AUTO DIFFERENTIAL - Abnormal       Result Value    WBC 7.0      nRBC 0.0      RBC 4.34      Hemoglobin 13.4      Hematocrit 41.6      MCV 96      MCH 30.9      MCHC 32.2      RDW 13.7      Platelets 275      Neutrophils % 72.3      Immature Granulocytes %, Automated 0.3      Lymphocytes % 16.5      Monocytes % 6.7      Eosinophils % 3.6      Basophils % 0.6      Neutrophils Absolute 5.07      Immature Granulocytes Absolute, Automated 0.02      Lymphocytes Absolute 1.16 (*)     Monocytes Absolute 0.47      Eosinophils Absolute 0.25      Basophils Absolute 0.04     URINALYSIS WITH REFLEX MICROSCOPIC AND CULTURE - Abnormal    Color, Urine Yellow      Appearance, Urine Hazy (*)     Specific Gravity, Urine 1.010      pH, Urine 6.0      Protein, Urine NEGATIVE      Glucose, Urine NEGATIVE      Blood, Urine SMALL (1+) (*)     Ketones, Urine NEGATIVE      Bilirubin, Urine NEGATIVE      Urobilinogen, Urine <2.0      Nitrite, Urine NEGATIVE      Leukocyte Esterase, Urine LARGE (3+) (*)    MICROSCOPIC ONLY, URINE - Abnormal    WBC, Urine >50 (*)     WBC Clumps, Urine MODERATE       RBC, Urine 11-20 (*)     Bacteria, Urine 2+ (*)    BASIC METABOLIC PANEL - Normal    Glucose 93      Sodium 140      Potassium 4.5      Chloride 103      Bicarbonate 32      Anion Gap 10      Urea Nitrogen 19      Creatinine 0.78      eGFR 87      Calcium 9.5     LIPASE - Normal    Lipase 59      Narrative:     Venipuncture immediately after or during the administration of Metamizole may lead to falsely low results. Testing should be performed immediately prior to Metamizole dosing.   HEPATIC FUNCTION PANEL - Normal    Albumin 3.8      Bilirubin, Total 0.4      Bilirubin, Direct 0.0      Alkaline Phosphatase 107      ALT 21      AST 24      Total Protein 6.7     LACTATE - Normal    Lactate 0.7      Narrative:     Venipuncture immediately after or during the administration of Metamizole may lead to falsely low results. Testing should be performed immediately  prior to Metamizole dosing.   URINE CULTURE   BLOOD CULTURE   BLOOD CULTURE   URINALYSIS WITH REFLEX MICROSCOPIC AND CULTURE    Narrative:     The following orders were created for panel order Urinalysis with Reflex Microscopic and Culture.  Procedure                               Abnormality         Status                     ---------                               -----------         ------                     Urinalysis with Reflex M...[[id number]]  Abnormal            Final result               Extra Urine Gray Tube[[id number]]                            Final result                 Please view results for these tests on the individual orders.   EXTRA URINE GRAY TUBE    Extra Tube Hold for add-ons.          CT abdomen pelvis w IV contrast   Final Result   1. In the interim since prior CT of the abdomen and pelvis in January of 2022, rim enhancing inflammatory fluid collection in the pelvis   along the left superior margin of the bladder has resolved, although   there is residual focal bladder wall thickening remaining, with   tethering of the bladder to one  of the diverticula in the proximal   sigmoid colon. Additionally, there is small focus of gas present   within the focal bladder wall thickening, and in the non dependent   portion of the bladder, raising suspicion for a colovesicular   fistula. Urological consultation is recommended.   2. Questionable tubular structure thought to represent a appendix is   arising from the cecum in the right lower quadrant, although appears   to be collapsing on itself is not well visualized on current exam. No   significant inflammatory changes are present along the periphery. No   inflammatory bowel wall thickening or abnormal bowel dilatation is   present.   3. Innumerable calcifications in the spleen likely represent chronic   sequela of prior granulomatous process.        MACRO:   None.        Signed by: Vic Servin 11/14/2023 5:09 PM   Dictation workstation:   [id number]           Procedures     Medical Decision Making  Is a 63-year-old female who presents to the emergency department with right lower quadrant abdominal pain that radiates to her back with onset 4 weeks ago that is been intermittent.  She does report occasional dark-colored urine.  CT scan of the abdomen pelvis shows concerns of a likely colovesicular fistula.  Patient also has evidence of urinary tract infection on physical exam.  The appendix location was thought to be somewhat questionable per the radiologist but no signs of inflammatory changes.  Patient has evidence of a urinary tract infection, no leukocytosis.  Her lactic her lactic acid is within normal limits.  She is afebrile and nontoxic-appearing. laboratory studies are otherwise unremarkable.  Patient strongly would like to go home.  I consulted with urologist, Dr. Mendosa who feels that patient can be discharged home on oral Cipro after getting 2 g of IV Rocephin.  He states that he can see the patient in his office on Monday morning at 9 AM.  He states that he will do a cystogram at that  time.  Patient was informed of this and felt comfortable with plan of discharge as this is what she wished would happen.  She was instructed to return immediately for any new or worsening symptoms and follow-up with urology as instructed.  Differential diagnosis includes but not limited to urinary tract infection, appendicitis, cholecystitis, choledocholithiasis, diverticulitis, fistula    Amount and/or Complexity of Data Reviewed  Labs: ordered. Decision-making details documented in ED Course.  Radiology: ordered.  Discussion of management or test interpretation with external provider(s): Consulted with urologist, Dr. Mendosa         Diagnoses as of 11/14/23 1932   Acute cystitis without hematuria   Right lower quadrant abdominal pain   Colovesical fistula                    Tanya Pizarro PA-C  11/14/23 1932

## 2023-11-15 LAB — BACTERIA UR CULT: NORMAL

## 2023-11-15 NOTE — DISCHARGE INSTRUCTIONS
You have an appointment at Dr. Mendosa's office on Monday 11/20/2023 at 9:00am. Please follow up as scheduled. Return for any new or worsening symptoms

## 2023-11-16 ENCOUNTER — OFFICE VISIT (OUTPATIENT)
Dept: PRIMARY CARE | Facility: CLINIC | Age: 63
End: 2023-11-16
Payer: COMMERCIAL

## 2023-11-16 VITALS
HEART RATE: 68 BPM | WEIGHT: 162 LBS | HEIGHT: 60 IN | DIASTOLIC BLOOD PRESSURE: 84 MMHG | BODY MASS INDEX: 31.8 KG/M2 | SYSTOLIC BLOOD PRESSURE: 138 MMHG

## 2023-11-16 DIAGNOSIS — N30.00 ACUTE CYSTITIS WITHOUT HEMATURIA: ICD-10-CM

## 2023-11-16 DIAGNOSIS — R10.31 RLQ ABDOMINAL PAIN: ICD-10-CM

## 2023-11-16 DIAGNOSIS — R91.1 LUNG NODULE: ICD-10-CM

## 2023-11-16 DIAGNOSIS — E78.00 HYPERCHOLESTEREMIA: ICD-10-CM

## 2023-11-16 DIAGNOSIS — R79.89 LOW VITAMIN B12 LEVEL: Primary | ICD-10-CM

## 2023-11-16 DIAGNOSIS — I10 PRIMARY HYPERTENSION: ICD-10-CM

## 2023-11-16 DIAGNOSIS — N32.1 COLOVESICAL FISTULA: ICD-10-CM

## 2023-11-16 PROBLEM — J20.9 ACUTE EXACERBATION OF CHRONIC BRONCHITIS (MULTI): Status: RESOLVED | Noted: 2023-11-16 | Resolved: 2023-11-16

## 2023-11-16 PROBLEM — J42 ACUTE EXACERBATION OF CHRONIC BRONCHITIS (MULTI): Status: RESOLVED | Noted: 2023-11-16 | Resolved: 2023-11-16

## 2023-11-16 PROBLEM — R05.9 COUGH: Status: RESOLVED | Noted: 2023-11-16 | Resolved: 2023-11-16

## 2023-11-16 PROBLEM — R06.2 WHEEZING ON AUSCULTATION: Status: RESOLVED | Noted: 2023-11-16 | Resolved: 2023-11-16

## 2023-11-16 PROCEDURE — 3079F DIAST BP 80-89 MM HG: CPT | Performed by: PHYSICIAN ASSISTANT

## 2023-11-16 PROCEDURE — 99214 OFFICE O/P EST MOD 30 MIN: CPT | Performed by: PHYSICIAN ASSISTANT

## 2023-11-16 PROCEDURE — 96372 THER/PROPH/DIAG INJ SC/IM: CPT | Performed by: PHYSICIAN ASSISTANT

## 2023-11-16 PROCEDURE — 3075F SYST BP GE 130 - 139MM HG: CPT | Performed by: PHYSICIAN ASSISTANT

## 2023-11-16 PROCEDURE — 1036F TOBACCO NON-USER: CPT | Performed by: PHYSICIAN ASSISTANT

## 2023-11-16 RX ORDER — LANOLIN ALCOHOL/MO/W.PET/CERES
1000 CREAM (GRAM) TOPICAL DAILY
Qty: 30 TABLET | Refills: 11 | Status: SHIPPED | OUTPATIENT
Start: 2023-11-16 | End: 2024-11-15

## 2023-11-16 RX ORDER — CYANOCOBALAMIN 1000 UG/ML
1000 INJECTION, SOLUTION INTRAMUSCULAR; SUBCUTANEOUS ONCE
Status: COMPLETED | OUTPATIENT
Start: 2023-11-16 | End: 2023-11-16

## 2023-11-16 RX ORDER — AZITHROMYCIN 250 MG/1
TABLET, FILM COATED ORAL
COMMUNITY
Start: 2023-08-04 | End: 2024-03-01 | Stop reason: ALTCHOICE

## 2023-11-16 RX ADMIN — CYANOCOBALAMIN 1000 MCG: 1000 INJECTION, SOLUTION INTRAMUSCULAR; SUBCUTANEOUS at 11:35

## 2023-11-16 ASSESSMENT — ENCOUNTER SYMPTOMS
SHORTNESS OF BREATH: 0
BACK PAIN: 0
FEVER: 0
ARTHRALGIAS: 1
SLEEP DISTURBANCE: 0
CHILLS: 0
FLANK PAIN: 0
CONFUSION: 0
EYE REDNESS: 0
COUGH: 0
WHEEZING: 0
RHINORRHEA: 0
NUMBNESS: 0
WOUND: 0
SINUS PAIN: 0
SORE THROAT: 0
VOMITING: 0
HEADACHES: 0
FATIGUE: 1
NECK PAIN: 0
ABDOMINAL PAIN: 1
EYE DISCHARGE: 0
CONSTIPATION: 0
BRUISES/BLEEDS EASILY: 0
DIARRHEA: 0
NAUSEA: 0
CHEST TIGHTNESS: 0
PALPITATIONS: 0
DIZZINESS: 0
FREQUENCY: 0
TREMORS: 0

## 2023-11-16 NOTE — PROGRESS NOTES
Subjective   Patient ID: Gallo Schmidt is a 63 y.o. female who presents for Follow-up (6 MO FU LAB)    HPI  FU [facility] ER ER 11/14/23  Acute cystitis without hematuria  R lower quad abd pain   Colovesical fistula   Set to follow with uro - Dr Mendosa on mon   On cipro   Discussed if symptoms worsen to go to the ER or call     Labs     Med check   allergies - taking zyrtec and flonase  HTN - stable on meds   polyarthralgia/OA - following with ortho -dr Hoover   contract DERM - using topical but cont to struggle bc of work exposure with the oil   COPD - pt is on trelegy and rescue inhaler -  VIJAY- on CPAP       Preventative Testing  mammo - nov 2022   colonoscopy - 2018 - repeat in 10 years   CT chest done June 2023 - repeat in 1 year FU lung cancer screen   She is following with pulm now  as well      Fall - Neg -NOV 2023   PHQ2 Neg May 2023     Patient Active Problem List   Diagnosis    Bilateral leg edema    Chronic pain of left knee    CMC arthritis    Contact dermatitis    COPD (chronic obstructive pulmonary disease) (CMS/HCC)    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    Diuretic-induced hypokalemia    GERD (gastroesophageal reflux disease)    Hypercholesteremia    Hyperkalemia    Hypermagnesemia    Hypertension    Impingement syndrome of right shoulder    Lung nodule    Medial epicondylitis    Obstructive sleep apnea, adult    Polyarthralgia    SOB (shortness of breath) on exertion    Cervicalgia    Chronic bilateral low back pain without sciatica    Mid back pain    Fatigue    Heart palpitations    Muscle cramp    Degenerative disc disease, cervical    Degenerative disc disease, thoracic    Degenerative disc disease at L5-S1 level       Review of Systems   Constitutional:  Positive for fatigue. Negative for chills and fever.   HENT:  Negative for congestion, rhinorrhea, sinus pain, sore throat and tinnitus.    Eyes:  Negative for discharge, redness and visual disturbance.    Respiratory:  Negative for cough, chest tightness, shortness of breath and wheezing.    Cardiovascular:  Negative for chest pain, palpitations and leg swelling.   Gastrointestinal:  Positive for abdominal pain. Negative for constipation, diarrhea, nausea and vomiting.   Endocrine: Negative for cold intolerance and heat intolerance.   Genitourinary:  Negative for flank pain, frequency and urgency.   Musculoskeletal:  Positive for arthralgias. Negative for back pain, gait problem and neck pain.   Skin:  Negative for rash and wound.   Neurological:  Negative for dizziness, tremors, syncope, numbness and headaches.   Hematological:  Does not bruise/bleed easily.   Psychiatric/Behavioral:  Negative for confusion, sleep disturbance and suicidal ideas.        Past Medical History:   Diagnosis Date    Acute exacerbation of chronic bronchitis (CMS/[facility]) 2023    COPD (chronic obstructive pulmonary disease) (CMS/[facility])     Cough 2023    Diverticulitis     Hypertension     Procedure and treatment not carried out because of patient's decision for unspecified reasons 2021    Mammogram declined    Wheezing on auscultation 2023       Past Surgical History:   Procedure Laterality Date     SECTION, CLASSIC      COLONOSCOPY  2022    REPEAT 5 YRS FOR POLYPS    CT GUIDED ABSCESS FLUID COLLECTION DRAINAGE  2022    CT GUIDED ABSCESS FLUID COLLECTION DRAINAGE 2022 PAR INPATIENT LEGACY    OVARIAN CYST REMOVAL         Family History   Problem Relation Name Age of Onset    Hypertension Mother      Stroke Father      COPD Father      Hypertension Father      Other (PERIPHERAL ARTERIAL DISEASE) Father      Thyroid disease Father      Heart attack Brother      Stomach cancer Maternal Grandmother      Wahoo's disease Paternal Grandmother         Social History     Tobacco Use    Smoking status: Former     Types: Cigarettes    Smokeless tobacco: Never   Vaping Use    Vaping Use: Never used    Substance Use Topics    Drug use: Never       Allergies   Allergen Reactions    Lisinopril Cough and Other     Cough    Sulfa (Sulfonamide Antibiotics) Hives       Current Outpatient Medications   Medication Sig Dispense Refill    azithromycin (Zithromax) 250 mg tablet       albuterol sulfate (Proair Digihaler) 90 mcg/actuation aero powdr breath act w/sensor inhaler Inhale 2 puffs every 6 hours if needed for shortness of breath or wheezing.      aspirin 81 mg chewable tablet Chew 1 tablet (81 mg) once daily. CHEW AND SWALLOW 90 tablet 3    atorvastatin (Lipitor) 40 mg tablet Take 1 tablet (40 mg) by mouth once daily.      Breztri Aerosphere 160-9-4.8 mcg/actuation HFA aerosol inhaler 2 puffs BID May have 90 day supply. 10.7 g 1    cholecalciferol (Vitamin D-3) 25 MCG (1000 UT) tablet Take 1 tablet (25 mcg) by mouth once daily.      ciprofloxacin (Cipro) 500 mg tablet Take 1 tablet (500 mg) by mouth 2 times a day for 10 days. 20 tablet 0    diclofenac sodium 1 % kit APPLY 2 GM 3 times daily PRN as needed for pain do not apply more than 8 grams per day      isosorbide mononitrate ER (Imdur) 30 mg 24 hr tablet Take 1 tablet (30 mg) by mouth once daily.      lactobacillus acidophilus & bulgar (Lactinex) 1 million cell chewable tablet Chew 1 tablet 3 times a day with meals.      losartan (Cozaar) 25 mg tablet Take 1 tablet (25 mg) by mouth once daily. 90 tablet 3    metoprolol succinate XL (Toprol-XL) 25 mg 24 hr tablet Take 1 tablet (25 mg) by mouth once daily. 90 tablet 3    montelukast (Singulair) 10 mg tablet Take 1 tablet (10 mg) by mouth once daily at bedtime.      omeprazole OTC (PriLOSEC OTC) 20 mg EC tablet Take 1 tablet (20 mg) by mouth once daily.      potassium chloride (Klor-Con) 20 mEq packet Take 20 mEq by mouth once daily. 90 each 3    triamterene-hydrochlorothiazid (Maxzide-25) 37.5-25 mg tablet Take 1 tablet by mouth once daily. 90 tablet 3     No current facility-administered medications for this  visit.       Objective   /84 (BP Location: Right arm, Patient Position: Sitting)   Pulse 68   Ht 1.524 m (5')   Wt 73.5 kg (162 lb)   BMI 31.64 kg/m²     Physical Exam  Vitals reviewed.   Constitutional:       Appearance: Normal appearance. She is obese.   HENT:      Head: Normocephalic.      Right Ear: External ear normal.      Left Ear: External ear normal.      Nose: Nose normal. No congestion or rhinorrhea.      Mouth/Throat:      Mouth: Mucous membranes are moist.   Eyes:      Extraocular Movements: Extraocular movements intact.      Conjunctiva/sclera: Conjunctivae normal.      Pupils: Pupils are equal, round, and reactive to light.   Cardiovascular:      Rate and Rhythm: Normal rate and regular rhythm.      Pulses: Normal pulses.   Pulmonary:      Effort: Pulmonary effort is normal.      Breath sounds: Normal breath sounds.   Abdominal:      General: Bowel sounds are normal.      Palpations: Abdomen is soft.      Tenderness: There is no abdominal tenderness. There is no right CVA tenderness or left CVA tenderness.   Musculoskeletal:         General: No tenderness. Normal range of motion.      Cervical back: Normal range of motion and neck supple. No tenderness.   Skin:     General: Skin is warm and dry.   Neurological:      General: No focal deficit present.      Mental Status: She is alert and oriented to person, place, and time.   Psychiatric:         Mood and Affect: Mood normal.         Behavior: Behavior normal.         Testing   Component      Latest Ref [facility] 10/27/2023   WBC      4.4 - 11.3 x10*3/uL 4.7    nRBC      0.0 - 0.0 /100 WBCs 0.0    RBC      4.00 - 5.20 x10*6/uL 4.12    HEMOGLOBIN      12.0 - 16.0 g/dL 12.4    HEMATOCRIT      36.0 - 46.0 % 39.4    MCV      80 - 100 fL 96    MCH      26.0 - 34.0 pg 30.1    MCHC      32.0 - 36.0 g/dL 31.5 (L)    RED CELL DISTRIBUTION WIDTH      11.5 - 14.5 % 13.9    Platelets      150 - 450 x10*3/uL 260    MEAN PLATELET VOLUME      7.5 - 11.5 fL 10.5     Neutrophils %      40.0 - 80.0 % 60.6    Immature Granulocytes %, Automated      0.0 - 0.9 % 0.2    Lymphocytes %      13.0 - 44.0 % 24.7    Monocytes %      2.0 - 10.0 % 10.3    Eosinophils %      0.0 - 6.0 % 3.6    Basophils %      0.0 - 2.0 % 0.6    Neutrophils Absolute      1.20 - 7.70 x10*3/uL 2.82    Immature Granulocytes Absolute, Automated      0.00 - 0.70 x10*3/uL 0.01    Lymphocytes Absolute      1.20 - 4.80 x10*3/uL 1.15 (L)    Monocytes Absolute      0.10 - 1.00 x10*3/uL 0.48    Eosinophils Absolute      0.00 - 0.70 x10*3/uL 0.17    Basophils Absolute      0.00 - 0.10 x10*3/uL 0.03    GLUCOSE      74 - 99 mg/dL 89    SODIUM      136 - 145 mmol/L 139    POTASSIUM      3.5 - 5.3 mmol/L 3.3 (L)    CHLORIDE      98 - 107 mmol/L 103    Bicarbonate      21 - 32 mmol/L 30    Anion Gap      10 - 20 mmol/L 9 (L)    Blood Urea Nitrogen      6 - 23 mg/dL 26 (H)    Creatinine      0.50 - 1.05 mg/dL 0.89    EGFR      >60 mL/min/1.73m*2 74    Calcium      8.6 - 10.3 mg/dL 9.0    Albumin      3.4 - 5.0 g/dL 3.7    Alkaline Phosphatase      33 - 136 U/L 95    Total Protein      6.4 - 8.2 g/dL 6.2 (L)    AST      9 - 39 U/L 22    Bilirubin Total      0.0 - 1.2 mg/dL 0.6    ALT      7 - 45 U/L 20    CHOLESTEROL      0 - 199 mg/dL 157    HDL CHOLESTEROL      mg/dL 64.0    Cholesterol/HDL Ratio 2.5    LDL Calculated      <=99 mg/dL 83    VLDL      0 - 40 mg/dL 10    TRIGLYCERIDES      0 - 149 mg/dL 48    Non HDL Cholesterol      0 - 149 mg/dL 93    MAGNESIUM      1.60 - 2.40 mg/dL 1.96    Vitamin B12      211 - 911 pg/mL 277         Impression    MDM    1) COMPLEXITY: MORE THAN 1 STABLE CHRONIC CONDITION ADDRESSED  2)DATA: TESTS INTERPRETED AND OR ORDERED, TOOK INDEPENDENT HISTORY OR RECORDS REVIEWED  3)RISK: MODERATE RISK DUE TO NATURE OF MEDICAL CONDITIONS/COMORBIDITY OR MEDICATIONS ORDERED OR SURGICAL OR PROCEDURE REFERRAL, .       Reviewed labs and Testing on file   Patient to follow diet low in cholesterol, fat, and  sodium.    Patient is advised to increase Exercise.  Patient is recommended to lose weight.  Reviewed Meds and discussed common side effects  Continue as directed   Reviewed labs on file   Hypokalemia - since resolved from ER lab check   B12 - injection and start daily supplement   Cont other meds   Reviewed ER visits labs and testing - follow up with uro as scheduled and cont on Abx   Patient is strongly advised to be compliant with recommendations.    Return to Clinic sooner if needed.  Patient denies further questions/concerns at this time     Assessment/Plan   Problem List Items Addressed This Visit             ICD-10-CM    Hypercholesteremia E78.00    Relevant Orders    CBC and Auto Differential    Comprehensive Metabolic Panel    Lipid Panel    Thyroid Stimulating Hormone    Thyroxine, Free    Magnesium    Vitamin B12    Hypertension I10    Relevant Orders    CBC and Auto Differential    Comprehensive Metabolic Panel    Lipid Panel    Thyroid Stimulating Hormone    Thyroxine, Free    Magnesium    Vitamin B12    Lung nodule R91.1     Other Visit Diagnoses         Codes    Low vitamin B12 level    -  Primary R79.89    Relevant Medications    cyanocobalamin (Vitamin B-12) injection 1,000 mcg (Completed)    cyanocobalamin (Vitamin B-12) 1,000 mcg tablet    Other Relevant Orders    Vitamin B12    Acute cystitis without hematuria     N30.00    RLQ abdominal pain     R10.31    Colovesical fistula     N32.1             FU in 6 mo with labs at [facility] fasting and med check

## 2023-11-19 LAB
BACTERIA BLD CULT: NORMAL
BACTERIA BLD CULT: NORMAL

## 2023-11-20 ENCOUNTER — OFFICE VISIT (OUTPATIENT)
Dept: UROLOGY | Facility: CLINIC | Age: 63
End: 2023-11-20
Payer: COMMERCIAL

## 2023-11-20 ENCOUNTER — ANCILLARY PROCEDURE (OUTPATIENT)
Dept: RADIOLOGY | Facility: CLINIC | Age: 63
End: 2023-11-20
Payer: COMMERCIAL

## 2023-11-20 VITALS
HEART RATE: 59 BPM | HEIGHT: 60 IN | WEIGHT: 163 LBS | SYSTOLIC BLOOD PRESSURE: 110 MMHG | BODY MASS INDEX: 32 KG/M2 | DIASTOLIC BLOOD PRESSURE: 84 MMHG

## 2023-11-20 DIAGNOSIS — R10.30 LOWER ABDOMINAL PAIN: Primary | ICD-10-CM

## 2023-11-20 DIAGNOSIS — Z12.31 ENCOUNTER FOR SCREENING MAMMOGRAM FOR MALIGNANT NEOPLASM OF BREAST: ICD-10-CM

## 2023-11-20 PROCEDURE — 77063 BREAST TOMOSYNTHESIS BI: CPT | Mod: BILATERAL PROCEDURE | Performed by: RADIOLOGY

## 2023-11-20 PROCEDURE — 77067 SCR MAMMO BI INCL CAD: CPT | Mod: BILATERAL PROCEDURE | Performed by: RADIOLOGY

## 2023-11-20 PROCEDURE — 3074F SYST BP LT 130 MM HG: CPT | Performed by: STUDENT IN AN ORGANIZED HEALTH CARE EDUCATION/TRAINING PROGRAM

## 2023-11-20 PROCEDURE — 77067 SCR MAMMO BI INCL CAD: CPT

## 2023-11-20 PROCEDURE — 99204 OFFICE O/P NEW MOD 45 MIN: CPT | Performed by: STUDENT IN AN ORGANIZED HEALTH CARE EDUCATION/TRAINING PROGRAM

## 2023-11-20 PROCEDURE — 1036F TOBACCO NON-USER: CPT | Performed by: STUDENT IN AN ORGANIZED HEALTH CARE EDUCATION/TRAINING PROGRAM

## 2023-11-20 PROCEDURE — 3079F DIAST BP 80-89 MM HG: CPT | Performed by: STUDENT IN AN ORGANIZED HEALTH CARE EDUCATION/TRAINING PROGRAM

## 2023-11-20 NOTE — PROGRESS NOTES
Subjective   Patient ID: Gallo Schmidt is a 63 y.o. female who presents for No chief complaint on file..  HPI  Patient presents for a follow up regarding acute cystitis without hematuria. She states that she went to the ED on 23 due to abdominal pain which started about 4 weeks ago. She is currently on her antibiotic course and feels like her symptoms are improving.   She is having pneumaturia, and voiding brownish debris.    Review of Systems  All systems were reviewed. Anything negative was noted in the HPI.    Objective   Physical Exam  HENT:      Right Ear: External ear normal.      Mouth/Throat:      Pharynx: Oropharynx is clear.   Abdominal:      General: Abdomen is flat. Bowel sounds are normal.      Palpations: Abdomen is soft.       Past Medical History:   Diagnosis Date    Acute exacerbation of chronic bronchitis (CMS/HCC) 2023    COPD (chronic obstructive pulmonary disease) (CMS/HCC)     Cough 2023    Diverticulitis     Hypertension     Procedure and treatment not carried out because of patient's decision for unspecified reasons 2021    Mammogram declined    Wheezing on auscultation 2023     Past Surgical History:   Procedure Laterality Date     SECTION, CLASSIC      COLONOSCOPY  2022    REPEAT 5 YRS FOR POLYPS    CT GUIDED ABSCESS FLUID COLLECTION DRAINAGE  2022    CT GUIDED ABSCESS FLUID COLLECTION DRAINAGE 2022 [facility] INPATIENT LEGACY    OVARIAN CYST REMOVAL       Assessment/Plan   There are no diagnoses linked to this encounter.     - Acute cystitis without hematuria/ Highly suspicious of vesico-colinic fistula: We had a very long and extensive discussion with the patient regarding the pathophysiology, differential diagnosis, risk factor, management, natural history, incidence and diagnostic work-up of the condition.    Plan:  CT cystogram  Cystoscopy    Follow up: 3 weeks    Scribed for Dr. Jean Mendosa by Yee Valdez, medical scribe, on  11/20/23 at 9:00 AM

## 2023-11-27 DIAGNOSIS — B37.9 YEAST INFECTION: Primary | ICD-10-CM

## 2023-11-27 RX ORDER — FLUCONAZOLE 150 MG/1
150 TABLET ORAL DAILY
Qty: 3 TABLET | Refills: 0 | Status: SHIPPED | OUTPATIENT
Start: 2023-11-27 | End: 2023-11-30

## 2023-12-04 ENCOUNTER — HOSPITAL ENCOUNTER (OUTPATIENT)
Dept: RADIOLOGY | Facility: HOSPITAL | Age: 63
Discharge: HOME | End: 2023-12-04
Payer: COMMERCIAL

## 2023-12-04 DIAGNOSIS — R10.30 LOWER ABDOMINAL PAIN: ICD-10-CM

## 2023-12-04 PROCEDURE — 2550000001 HC RX 255 CONTRASTS: Performed by: STUDENT IN AN ORGANIZED HEALTH CARE EDUCATION/TRAINING PROGRAM

## 2023-12-04 PROCEDURE — 72192 CT PELVIS W/O DYE: CPT | Performed by: RADIOLOGY

## 2023-12-04 PROCEDURE — 72192 CT PELVIS W/O DYE: CPT

## 2023-12-04 PROCEDURE — 51600 INJECTION FOR BLADDER X-RAY: CPT | Performed by: RADIOLOGY

## 2023-12-04 PROCEDURE — 74430 CONTRAST X-RAY BLADDER: CPT | Performed by: RADIOLOGY

## 2023-12-04 RX ADMIN — IOHEXOL 50 ML: 350 INJECTION, SOLUTION INTRAVENOUS at 14:38

## 2023-12-27 ENCOUNTER — ANCILLARY PROCEDURE (OUTPATIENT)
Dept: RADIOLOGY | Facility: CLINIC | Age: 63
End: 2023-12-27
Payer: COMMERCIAL

## 2023-12-27 PROCEDURE — 77080 DXA BONE DENSITY AXIAL: CPT

## 2024-01-04 DIAGNOSIS — E78.00 HYPERCHOLESTEREMIA: ICD-10-CM

## 2024-01-04 RX ORDER — ATORVASTATIN CALCIUM 40 MG/1
40 TABLET, FILM COATED ORAL DAILY
Qty: 90 TABLET | Refills: 3 | Status: SHIPPED
Start: 2024-01-04 | End: 2024-02-03 | Stop reason: WASHOUT

## 2024-02-03 ENCOUNTER — OFFICE VISIT (OUTPATIENT)
Dept: URGENT CARE | Facility: CLINIC | Age: 64
End: 2024-02-03
Payer: COMMERCIAL

## 2024-02-03 VITALS
OXYGEN SATURATION: 94 % | TEMPERATURE: 99.5 F | DIASTOLIC BLOOD PRESSURE: 85 MMHG | HEART RATE: 83 BPM | WEIGHT: 159 LBS | SYSTOLIC BLOOD PRESSURE: 156 MMHG | BODY MASS INDEX: 31.22 KG/M2 | HEIGHT: 60 IN

## 2024-02-03 DIAGNOSIS — B34.9 NONSPECIFIC SYNDROME SUGGESTIVE OF VIRAL ILLNESS: ICD-10-CM

## 2024-02-03 DIAGNOSIS — J06.9 URI WITH COUGH AND CONGESTION: ICD-10-CM

## 2024-02-03 DIAGNOSIS — U07.1 COVID-19: Primary | ICD-10-CM

## 2024-02-03 LAB
POC RSV RAPID ANTIGEN: NEGATIVE
POC TRIPLEX FLU A-AG: ABNORMAL
POC TRIPLEX FLU B-AG: ABNORMAL
POC TRIPLEX SARSCOV-2 AG: ABNORMAL

## 2024-02-03 PROCEDURE — 99212 OFFICE O/P EST SF 10 MIN: CPT | Mod: 25 | Performed by: PHYSICIAN ASSISTANT

## 2024-02-03 PROCEDURE — 87428 SARSCOV & INF VIR A&B AG IA: CPT | Performed by: PHYSICIAN ASSISTANT

## 2024-02-03 RX ORDER — AZELASTINE 1 MG/ML
2 SPRAY, METERED NASAL 2 TIMES DAILY
Qty: 30 ML | Refills: 0 | Status: SHIPPED | OUTPATIENT
Start: 2024-02-03 | End: 2024-02-18

## 2024-02-03 RX ORDER — DEXTROMETHORPHAN HBR AND PYRILAMINE MALEATE 30; 30 MG/1; MG/1
1 TABLET ORAL EVERY 6 HOURS
Qty: 28 TABLET | Refills: 0 | Status: SHIPPED | OUTPATIENT
Start: 2024-02-03 | End: 2024-02-10

## 2024-02-03 RX ORDER — NIRMATRELVIR AND RITONAVIR 300-100 MG
3 KIT ORAL 2 TIMES DAILY
Qty: 30 TABLET | Refills: 0 | Status: SHIPPED | OUTPATIENT
Start: 2024-02-03 | End: 2024-02-08

## 2024-02-03 NOTE — LETTER
February 5, 2024     Patient: Gallo Schmidt   YOB: 1960   Date of Visit: 2/3/2024       To Whom It May Concern:    Gallo Schmidt was seen in my clinic on 2/3/2024 at 10:25 am. Please excuse Gallo for her absence from work on this day THROUGH 2/5/2024 TO 2/7/2024.    If you have any questions or concerns, please don't hesitate to call.         Sincerely,         AGNIESZKA MORGAN CMA        CC: No Recipients

## 2024-02-03 NOTE — PROGRESS NOTES
[facility] URGENT CARE JOSE NOTE:      Name: Gallo Schmidt, 63 y.o.    CSN:[id number]   MRN:[id number]    PCP: Tanvi Holguin PA-C    ALL:    Allergies   Allergen Reactions    Lisinopril Cough and Other     Cough    Sulfa (Sulfonamide Antibiotics) Rash       History:    Chief Complaint: URI (Cough, body aches, SOB, sinus congestion)    Encounter Date: 2/3/2024  10:15hrs    HPI: The history was obtained from the patient. Gallo is a 63 y.o. female, who presents with a chief complaint of URI (Cough, body aches, SOB, sinus congestion)     Has had a sinus cold for the last week or so but recently in the last 24 hours she developed whole body symptoms myalgias, arthralgias, moderate cough and headache, she is not had a true fever but feels feverish and chilly.        PMHx:    Past Medical History:   Diagnosis Date    Acute exacerbation of chronic bronchitis (CMS/HCC) 11/16/2023    COPD (chronic obstructive pulmonary disease) (CMS/Formerly [facility])     Cough 11/16/2023    Diverticulitis     Hypertension     Procedure and treatment not carried out because of patient's decision for unspecified reasons 12/22/2021    Mammogram declined    Wheezing on auscultation 11/16/2023              Current Outpatient Medications   Medication Sig Dispense Refill    albuterol sulfate (Proair Digihaler) 90 mcg/actuation aero powdr breath act w/sensor inhaler Inhale 2 puffs every 6 hours if needed for shortness of breath or wheezing.      Breztri Aerosphere 160-9-4.8 mcg/actuation HFA aerosol inhaler 2 puffs BID May have 90 day supply. 10.7 g 1    aspirin 81 mg chewable tablet Chew 1 tablet (81 mg) once daily. CHEW AND SWALLOW 90 tablet 3    azelastine (Astelin) 137 mcg (0.1 %) nasal spray Administer 2 sprays into each nostril 2 times a day for 15 days. Use in each nostril as directed 30 mL 0    azithromycin (Zithromax) 250 mg tablet       cholecalciferol (Vitamin D-3) 25 MCG (1000 UT) tablet Take 1 tablet (25 mcg) by mouth once  daily.      cyanocobalamin (Vitamin B-12) 1,000 mcg tablet Take 1 tablet (1,000 mcg) by mouth once daily. 30 tablet 11    diclofenac sodium 1 % kit APPLY 2 GM 3 times daily PRN as needed for pain do not apply more than 8 grams per day      isosorbide mononitrate ER (Imdur) 30 mg 24 hr tablet Take 1 tablet (30 mg) by mouth once daily.      lactobacillus acidophilus & bulgar (Lactinex) 1 million cell chewable tablet Chew 1 tablet 3 times a day with meals.      losartan (Cozaar) 25 mg tablet Take 1 tablet (25 mg) by mouth once daily. 90 tablet 3    metoprolol succinate XL (Toprol-XL) 25 mg 24 hr tablet Take 1 tablet (25 mg) by mouth once daily. 90 tablet 3    montelukast (Singulair) 10 mg tablet Take 1 tablet (10 mg) by mouth once daily at bedtime.      nirmatrelvir-ritonavir (Paxlovid) 300 mg (150 mg x 2)-100 mg tablet therapy pack Take 3 tablets by mouth 2 times a day for 5 days. Follow the instructions on the package 30 tablet 0    omeprazole OTC (PriLOSEC OTC) 20 mg EC tablet Take 1 tablet (20 mg) by mouth once daily.      potassium chloride (Klor-Con) 20 mEq packet Take 20 mEq by mouth once daily. 90 each 3    pyrilamine-dextromethorphan ([place] DMT) 30-30 mg tablet Take 1 tablet by mouth every 6 hours for 7 days. 28 tablet 0    triamterene-hydrochlorothiazid (Maxzide-25) 37.5-25 mg tablet Take 1 tablet by mouth once daily. 90 tablet 3     No current facility-administered medications for this visit.         PMSx:    Past Surgical History:   Procedure Laterality Date     SECTION, CLASSIC      COLONOSCOPY  2022    REPEAT 5 YRS FOR POLYPS    CT GUIDED ABSCESS FLUID COLLECTION DRAINAGE  2022    CT GUIDED ABSCESS FLUID COLLECTION DRAINAGE 2022 [facility] INPATIENT LEGACY    OVARIAN CYST REMOVAL         Fam Hx:   Family History   Problem Relation Name Age of Onset    Hypertension Mother      Stroke Father      COPD Father      Hypertension Father      Other (PERIPHERAL ARTERIAL DISEASE) Father       Thyroid disease Father      Heart attack Brother      Stomach cancer Maternal Grandmother      Chelsea's disease Paternal Grandmother         SOC. Hx:     Social History     Socioeconomic History    Marital status:      Spouse name: Not on file    Number of children: Not on file    Years of education: Not on file    Highest education level: Not on file   Occupational History    Not on file   Tobacco Use    Smoking status: Former     Types: Cigarettes    Smokeless tobacco: Never   Vaping Use    Vaping Use: Never used   Substance and Sexual Activity    Alcohol use: Never    Drug use: Never    Sexual activity: Never   Other Topics Concern    Not on file   Social History Narrative    Not on file     Social Determinants of Health     Financial Resource Strain: Not on file   Food Insecurity: Not on file   Transportation Needs: Not on file   Physical Activity: Not on file   Stress: Not on file   Social Connections: Not on file   Intimate Partner Violence: Not on file   Housing Stability: Not on file         Vitals:    02/03/24 1020   BP: 156/85   Pulse: 83   Temp: 37.5 °C (99.5 °F)   SpO2: 94%     72.1 kg (159 lb)          Physical Exam  Constitutional:       Appearance: Normal appearance. She is normal weight.   HENT:      Head: Normocephalic and atraumatic.      Right Ear: Hearing, tympanic membrane, ear canal and external ear normal.      Left Ear: Hearing, tympanic membrane, ear canal and external ear normal.      Nose: Congestion present. No rhinorrhea.      Mouth/Throat:      Lips: Pink.      Mouth: Mucous membranes are moist.   Eyes:      Extraocular Movements: Extraocular movements intact.   Cardiovascular:      Rate and Rhythm: Normal rate and regular rhythm.   Pulmonary:      Effort: Pulmonary effort is normal.      Breath sounds: Normal breath sounds.      Comments: Scattered wheezes consistent with chronic lung disease  Musculoskeletal:         General: Normal range of motion.      Cervical back: Normal  range of motion and neck supple.      Comments: Grossly normal strength   Skin:     General: Skin is warm.      Findings: No rash.   Neurological:      Mental Status: She is alert and oriented to person, place, and time.   Psychiatric:         Behavior: Behavior normal.         LABORATORY @ RADIOLOGICAL IMAGING (if done):     Results for orders placed or performed in visit on 02/03/24 (from the past 24 hour(s))   POCT respiratory syncytial virus manually resulted   Result Value Ref Range    RSV Rapid Ag Negative Negative   POCT BD Veritor Triplex Ag   Result Value Ref Range    POC Triplex SARS-CoV-2 Ag (A) Presumptive negative for Triplex SARS-CoV-2 (no antigen detected)     Positive test for Triplex SARS-CoV-2 Ag (SARS-CoV-2 antigen detected)    POC Triplex Flu A-Ag  Presumptive negative for Triplex FLU A (no antigen detected)     Presumptive negative for Triplex FLU A (no antigen detected)    POC Triplex Flu B-Ag  Presumptive negative for Triplex FLU B (no antigen detected)     Presumptive negative for Triplex FLU B (no antigen detected)       UC COURSE/MEDICAL DECISION MAKING:    Gallo is a 63 y.o., who presents with a working diagnosis of   1. COVID-19    2. URI with cough and congestion    3. Nonspecific syndrome suggestive of viral illness     with a differential to include:   Influenza, parainfluenza, rhinovirus, adenovirus, metapneumovirus, coronavirus, COVID-19, postnasal drip, strep pharyngitis, GERD, retropharyngeal abscess, tonsillitis, adenitis, seasonal allergies     I reviewed the  COVID-19 algorithm, reviewed the algorithm & symptoms with the patient, and counseled the patient on COVID-19 current recommendations. It was determined the patient's symptoms are consistent with need for testing for COVID-19, using proper PPE, I was able to obtain a nasal swab without incident.    Patient was given a note for work, pt is encouraged to self quarantine, pt is encouraged to take the prescription  medications as designed, & the patient was discharged. I encourage pt seek higher level of care should symptoms persist/change or exacerbate.    At this time his symptoms are mild to moderate, & the patient does meet criteria to be treated at home while awaiting testing results.    I checked previous renal function from 11/20/2023, reviewed medication interactions and encourage patient to hold her Lipitor as she is taking the Paxlovid.  She was agreeable.    Presley Frederick PA-C   Advanced Practice Provider  [facility] URGENT CARE       complains of pain/discomfort

## 2024-02-28 DIAGNOSIS — J42 CHRONIC BRONCHITIS, UNSPECIFIED CHRONIC BRONCHITIS TYPE (MULTI): Primary | ICD-10-CM

## 2024-02-29 RX ORDER — BUDESONIDE, GLYCOPYRROLATE, AND FORMOTEROL FUMARATE 160; 9; 4.8 UG/1; UG/1; UG/1
AEROSOL, METERED RESPIRATORY (INHALATION)
Qty: 10.7 G | Refills: 1 | Status: SHIPPED | OUTPATIENT
Start: 2024-02-29

## 2024-03-01 ENCOUNTER — OFFICE VISIT (OUTPATIENT)
Dept: PULMONOLOGY | Facility: CLINIC | Age: 64
End: 2024-03-01
Payer: COMMERCIAL

## 2024-03-01 VITALS
HEART RATE: 70 BPM | TEMPERATURE: 97.1 F | BODY MASS INDEX: 31.93 KG/M2 | WEIGHT: 158.4 LBS | HEIGHT: 59 IN | DIASTOLIC BLOOD PRESSURE: 67 MMHG | OXYGEN SATURATION: 96 % | SYSTOLIC BLOOD PRESSURE: 107 MMHG

## 2024-03-01 DIAGNOSIS — J40 BRONCHITIS: Primary | ICD-10-CM

## 2024-03-01 DIAGNOSIS — J41.8 MIXED SIMPLE AND MUCOPURULENT CHRONIC BRONCHITIS (MULTI): ICD-10-CM

## 2024-03-01 PROCEDURE — 3074F SYST BP LT 130 MM HG: CPT | Performed by: INTERNAL MEDICINE

## 2024-03-01 PROCEDURE — 99213 OFFICE O/P EST LOW 20 MIN: CPT | Performed by: INTERNAL MEDICINE

## 2024-03-01 PROCEDURE — 1036F TOBACCO NON-USER: CPT | Performed by: INTERNAL MEDICINE

## 2024-03-01 PROCEDURE — 3078F DIAST BP <80 MM HG: CPT | Performed by: INTERNAL MEDICINE

## 2024-03-01 RX ORDER — AZITHROMYCIN 250 MG/1
250 TABLET, FILM COATED ORAL DAILY
Status: DISCONTINUED | OUTPATIENT
Start: 2024-03-01 | End: 2024-03-01

## 2024-03-01 RX ORDER — AZITHROMYCIN 250 MG/1
TABLET, FILM COATED ORAL
Qty: 8 TABLET | Refills: 0 | Status: SHIPPED | OUTPATIENT
Start: 2024-03-01 | End: 2024-03-06

## 2024-03-01 NOTE — PROGRESS NOTES
"Subjective   Patient ID: Gallo Schmidt is a 63 y.o. female who presents for Follow-up (Patient here for follow-up.  States occasionally have coughing spells since February s/p COVID.).  HPI  Patient was seen today in the office on a follow-up visit.  Patient reports that she was told she had a COVID infection in January.  She is using her inhalers on a regular basis which is the Breztri at 2 puffs twice a day and albuterol inhaler at 2 puffs up to 4 times daily.  She currently reports that she tends to get \"winded\" easily.  She does not like to use steroids has a make her feel uneasy.  Patient has a mild cough with some yellow sputum production.  She has shortness of breath with increased activities.  Review of Systems  She currently denies having any fever, chills, rhinitis or sore throat.  She is not smoking.  Objective   Physical Exam  HEENT, no inflammation noted on examination of the oropharynx.  She does admit to rinsing her mouth after using the Breztri.  Cardio, heart sounds are regular rate and rhythm.  Extremities, no cyanosis, clubbing or pretibial edema.  Pulmonary, she does have and expiratory wheezes noted bilaterally.  Assessment/Plan        Impressions:  1.  Acute bronchitis.  2.  Chronic bronchitis.  Recommendations:  1.  I have put her on a prescription for azithromycin at 250 mg tablets to be taken over the next 7 days.  This will be 2 tablets first day and then 1 tablet a day for the next 6 days.  I told her to take the antibiotic with a probiotic.  2.  Follow-up with the patient in 3 weeks.  I told her that if she has new problems with her breathing in the interim to contact our office we can see her sooner.  3.  Her antibiotics were electronically sent to [facility] in [place].      This note was transcribed using the Dragon Dictation system.  There may be grammatical, punctuation, or verbiage errors that occur with voice recognition programs.    Obdulio Chakraborty,  03/01/24 11:41 AM   "

## 2024-03-14 DIAGNOSIS — I10 PRIMARY HYPERTENSION: ICD-10-CM

## 2024-03-14 RX ORDER — LOSARTAN POTASSIUM 25 MG/1
25 TABLET ORAL DAILY
Qty: 90 TABLET | Refills: 3 | Status: SHIPPED | OUTPATIENT
Start: 2024-03-14

## 2024-03-14 RX ORDER — METOPROLOL SUCCINATE 25 MG/1
25 TABLET, EXTENDED RELEASE ORAL DAILY
Qty: 90 TABLET | Refills: 3 | Status: SHIPPED | OUTPATIENT
Start: 2024-03-14

## 2024-03-18 DIAGNOSIS — R60.0 BILATERAL LEG EDEMA: ICD-10-CM

## 2024-03-18 DIAGNOSIS — E87.5 HYPERKALEMIA: ICD-10-CM

## 2024-03-18 DIAGNOSIS — Z00.00 HEALTHCARE MAINTENANCE: ICD-10-CM

## 2024-03-18 RX ORDER — POTASSIUM CHLORIDE 1.5 G/1.58G
POWDER, FOR SOLUTION ORAL
Qty: 90 PACKET | Refills: 3 | Status: SHIPPED | OUTPATIENT
Start: 2024-03-18

## 2024-03-18 RX ORDER — NAPROXEN SODIUM 220 MG/1
81 TABLET, FILM COATED ORAL DAILY
Qty: 90 TABLET | Refills: 3 | Status: SHIPPED | OUTPATIENT
Start: 2024-03-18

## 2024-03-18 RX ORDER — TRIAMTERENE/HYDROCHLOROTHIAZID 37.5-25 MG
1 TABLET ORAL DAILY
Qty: 90 TABLET | Refills: 3 | Status: SHIPPED | OUTPATIENT
Start: 2024-03-18

## 2024-03-22 ENCOUNTER — OFFICE VISIT (OUTPATIENT)
Dept: PULMONOLOGY | Facility: CLINIC | Age: 64
End: 2024-03-22
Payer: COMMERCIAL

## 2024-03-22 VITALS
HEIGHT: 59 IN | DIASTOLIC BLOOD PRESSURE: 80 MMHG | BODY MASS INDEX: 31.85 KG/M2 | OXYGEN SATURATION: 95 % | TEMPERATURE: 98.3 F | HEART RATE: 66 BPM | SYSTOLIC BLOOD PRESSURE: 136 MMHG | WEIGHT: 158 LBS | RESPIRATION RATE: 18 BRPM

## 2024-03-22 DIAGNOSIS — R06.09 DYSPNEA ON EXERTION: ICD-10-CM

## 2024-03-22 DIAGNOSIS — J41.8 MIXED SIMPLE AND MUCOPURULENT CHRONIC BRONCHITIS (MULTI): Primary | ICD-10-CM

## 2024-03-22 PROCEDURE — 1036F TOBACCO NON-USER: CPT | Performed by: INTERNAL MEDICINE

## 2024-03-22 PROCEDURE — 99214 OFFICE O/P EST MOD 30 MIN: CPT | Performed by: INTERNAL MEDICINE

## 2024-03-22 PROCEDURE — 3075F SYST BP GE 130 - 139MM HG: CPT | Performed by: INTERNAL MEDICINE

## 2024-03-22 PROCEDURE — 3079F DIAST BP 80-89 MM HG: CPT | Performed by: INTERNAL MEDICINE

## 2024-03-22 NOTE — PROGRESS NOTES
Subjective   Patient ID: Gallo Schmidt is a 63 y.o. female who presents for Bronchitis.  HPI  Patient was seen today in the office on a 3-month follow-up visit.  She is currently on Breztri at 2 elations twice a day and albuterol inhaler at 2 puffs 4 times daily as needed shortness of breath.  She is using the Breztri with a spacer.  Patient currently admits to having a nonproductive cough.  She has dyspnea with walking from the office to her car.  Breathing is overall improved with the course of azithromycin.  Review of Systems  She denies having any fever, chills, rhinitis or sore throat.  Objective   Physical Exam  HEENT, examination of the oropharynx was unremarkable  Pulmonary, patient has an expiratory scattered wheezes.  Cardio, heart sounds are regular rate and rhythm.  Extremities, no pretibial edema, cyanosis or clubbing.  Psych, the patient was alert and oriented x 3.  Assessment/Plan        Impressions:  1.  Chronic bronchitis.  2.  Dyspnea on exertion.  Recommendations:  1.  Stay on present bronchodilator therapy.  2.  Follow-up with the patient in 3 months.  3.  The patient reports that she is not smoking.      This note was transcribed using the Dragon Dictation system.  There may be grammatical, punctuation, or verbiage errors that occur with voice recognition programs.    Obdulio Chakraborty DO 03/22/24 12:18 PM

## 2024-04-30 DIAGNOSIS — R07.9 CHEST PAIN, UNSPECIFIED TYPE: ICD-10-CM

## 2024-04-30 RX ORDER — ISOSORBIDE MONONITRATE 30 MG/1
30 TABLET, EXTENDED RELEASE ORAL DAILY
Qty: 90 TABLET | Refills: 3 | Status: SHIPPED | OUTPATIENT
Start: 2024-04-30 | End: 2024-05-01 | Stop reason: SDUPTHER

## 2024-05-01 DIAGNOSIS — R07.9 CHEST PAIN, UNSPECIFIED TYPE: ICD-10-CM

## 2024-05-02 RX ORDER — ISOSORBIDE MONONITRATE 30 MG/1
30 TABLET, EXTENDED RELEASE ORAL DAILY
Qty: 90 TABLET | Refills: 3 | Status: SHIPPED | OUTPATIENT
Start: 2024-05-02 | End: 2025-05-02

## 2024-05-14 ENCOUNTER — TELEPHONE (OUTPATIENT)
Dept: PRIMARY CARE | Facility: CLINIC | Age: 64
End: 2024-05-14
Payer: COMMERCIAL

## 2024-05-14 NOTE — TELEPHONE ENCOUNTER
Patient has an appt scheduled on 6/10/2024.  I will add talk about ct to appt notes    ----- Message -----   From: Kendrick Lopez MD   Sent: 5/14/2024   8:09 AM EDT   To: Ludivina Moffett CMA   Subject: RE: annual lung cancer screening order needed     Oc to discuss  Screening CT lung, thx   ----- Message -----   From: Miracle Rodriguez RN   Sent: 5/9/2024   3:38 PM EDT   To: Tanvi Holguin PA-C   Subject: annual lung cancer screening order needed        This patient is due for an annual CT low dose lung cancer screening ([id number]).  Their last screen was on 6/27/23.  If your patient is still interested and qualifies for the program, please let the patient know and place an order. We will also send out a reminder to the patient.  Thank you, Lung Cancer Screening Navigation Team [phone]

## 2024-05-16 ENCOUNTER — APPOINTMENT (OUTPATIENT)
Dept: PRIMARY CARE | Facility: CLINIC | Age: 64
End: 2024-05-16
Payer: COMMERCIAL

## 2024-05-17 ENCOUNTER — APPOINTMENT (OUTPATIENT)
Dept: RADIOLOGY | Facility: HOSPITAL | Age: 64
End: 2024-05-17
Payer: COMMERCIAL

## 2024-05-17 ENCOUNTER — HOSPITAL ENCOUNTER (OUTPATIENT)
Dept: CARDIOLOGY | Facility: HOSPITAL | Age: 64
Discharge: HOME | End: 2024-05-17
Payer: COMMERCIAL

## 2024-05-17 ENCOUNTER — OFFICE VISIT (OUTPATIENT)
Dept: PRIMARY CARE | Facility: CLINIC | Age: 64
End: 2024-05-17
Payer: COMMERCIAL

## 2024-05-17 ENCOUNTER — HOSPITAL ENCOUNTER (EMERGENCY)
Facility: HOSPITAL | Age: 64
Discharge: HOME | End: 2024-05-17
Attending: EMERGENCY MEDICINE
Payer: COMMERCIAL

## 2024-05-17 VITALS
HEIGHT: 59 IN | SYSTOLIC BLOOD PRESSURE: 157 MMHG | BODY MASS INDEX: 31.45 KG/M2 | TEMPERATURE: 96.3 F | HEART RATE: 58 BPM | WEIGHT: 156 LBS | OXYGEN SATURATION: 98 % | DIASTOLIC BLOOD PRESSURE: 68 MMHG | RESPIRATION RATE: 16 BRPM

## 2024-05-17 VITALS
HEIGHT: 59 IN | BODY MASS INDEX: 31.65 KG/M2 | HEART RATE: 87 BPM | SYSTOLIC BLOOD PRESSURE: 138 MMHG | DIASTOLIC BLOOD PRESSURE: 82 MMHG | WEIGHT: 157 LBS

## 2024-05-17 DIAGNOSIS — R07.9 CHEST PAIN, UNSPECIFIED TYPE: ICD-10-CM

## 2024-05-17 DIAGNOSIS — I10 HYPERTENSION, UNSPECIFIED TYPE: Primary | ICD-10-CM

## 2024-05-17 DIAGNOSIS — R10.2 PELVIC PAIN: Primary | ICD-10-CM

## 2024-05-17 DIAGNOSIS — K57.92 DIVERTICULITIS: ICD-10-CM

## 2024-05-17 LAB
ANION GAP SERPL CALC-SCNC: 13 MMOL/L (ref 10–20)
BASOPHILS # BLD AUTO: 0.03 X10*3/UL (ref 0–0.1)
BASOPHILS NFR BLD AUTO: 0.4 %
BUN SERPL-MCNC: 22 MG/DL (ref 6–23)
CALCIUM SERPL-MCNC: 9.2 MG/DL (ref 8.6–10.3)
CARDIAC TROPONIN I PNL SERPL HS: 5 NG/L (ref 0–13)
CARDIAC TROPONIN I PNL SERPL HS: 5 NG/L (ref 0–13)
CHLORIDE SERPL-SCNC: 105 MMOL/L (ref 98–107)
CO2 SERPL-SCNC: 27 MMOL/L (ref 21–32)
CREAT SERPL-MCNC: 0.87 MG/DL (ref 0.5–1.05)
EGFRCR SERPLBLD CKD-EPI 2021: 75 ML/MIN/1.73M*2
EOSINOPHIL # BLD AUTO: 0.24 X10*3/UL (ref 0–0.7)
EOSINOPHIL NFR BLD AUTO: 3.6 %
ERYTHROCYTE [DISTWIDTH] IN BLOOD BY AUTOMATED COUNT: 13.2 % (ref 11.5–14.5)
GLUCOSE SERPL-MCNC: 93 MG/DL (ref 74–99)
HCT VFR BLD AUTO: 42 % (ref 36–46)
HGB BLD-MCNC: 13.3 G/DL (ref 12–16)
IMM GRANULOCYTES # BLD AUTO: 0.04 X10*3/UL (ref 0–0.7)
IMM GRANULOCYTES NFR BLD AUTO: 0.6 % (ref 0–0.9)
LYMPHOCYTES # BLD AUTO: 1.19 X10*3/UL (ref 1.2–4.8)
LYMPHOCYTES NFR BLD AUTO: 17.6 %
MCH RBC QN AUTO: 30.8 PG (ref 26–34)
MCHC RBC AUTO-ENTMCNC: 31.7 G/DL (ref 32–36)
MCV RBC AUTO: 97 FL (ref 80–100)
MONOCYTES # BLD AUTO: 0.46 X10*3/UL (ref 0.1–1)
MONOCYTES NFR BLD AUTO: 6.8 %
NEUTROPHILS # BLD AUTO: 4.8 X10*3/UL (ref 1.2–7.7)
NEUTROPHILS NFR BLD AUTO: 71 %
NRBC BLD-RTO: 0 /100 WBCS (ref 0–0)
PLATELET # BLD AUTO: 244 X10*3/UL (ref 150–450)
POC APPEARANCE, URINE: CLEAR
POC BILIRUBIN, URINE: NEGATIVE
POC BLOOD, URINE: ABNORMAL
POC COLOR, URINE: YELLOW
POC GLUCOSE, URINE: NEGATIVE MG/DL
POC KETONES, URINE: NEGATIVE MG/DL
POC LEUKOCYTES, URINE: ABNORMAL
POC NITRITE,URINE: NEGATIVE
POC PH, URINE: 8.5 PH
POC PROTEIN, URINE: NEGATIVE MG/DL
POC SPECIFIC GRAVITY, URINE: 1.01
POC UROBILINOGEN, URINE: 0.2 EU/DL
POTASSIUM SERPL-SCNC: 3.8 MMOL/L (ref 3.5–5.3)
RBC # BLD AUTO: 4.32 X10*6/UL (ref 4–5.2)
SODIUM SERPL-SCNC: 141 MMOL/L (ref 136–145)
WBC # BLD AUTO: 6.8 X10*3/UL (ref 4.4–11.3)

## 2024-05-17 PROCEDURE — 81003 URINALYSIS AUTO W/O SCOPE: CPT | Performed by: NURSE PRACTITIONER

## 2024-05-17 PROCEDURE — 84484 ASSAY OF TROPONIN QUANT: CPT | Performed by: EMERGENCY MEDICINE

## 2024-05-17 PROCEDURE — 99214 OFFICE O/P EST MOD 30 MIN: CPT | Performed by: NURSE PRACTITIONER

## 2024-05-17 PROCEDURE — 85025 COMPLETE CBC W/AUTO DIFF WBC: CPT | Performed by: EMERGENCY MEDICINE

## 2024-05-17 PROCEDURE — 1036F TOBACCO NON-USER: CPT | Performed by: NURSE PRACTITIONER

## 2024-05-17 PROCEDURE — 36415 COLL VENOUS BLD VENIPUNCTURE: CPT | Performed by: EMERGENCY MEDICINE

## 2024-05-17 PROCEDURE — 99283 EMERGENCY DEPT VISIT LOW MDM: CPT | Mod: 25

## 2024-05-17 PROCEDURE — 71045 X-RAY EXAM CHEST 1 VIEW: CPT | Performed by: STUDENT IN AN ORGANIZED HEALTH CARE EDUCATION/TRAINING PROGRAM

## 2024-05-17 PROCEDURE — 3075F SYST BP GE 130 - 139MM HG: CPT | Performed by: NURSE PRACTITIONER

## 2024-05-17 PROCEDURE — 93005 ELECTROCARDIOGRAM TRACING: CPT

## 2024-05-17 PROCEDURE — 87086 URINE CULTURE/COLONY COUNT: CPT

## 2024-05-17 PROCEDURE — 80048 BASIC METABOLIC PNL TOTAL CA: CPT | Performed by: EMERGENCY MEDICINE

## 2024-05-17 PROCEDURE — 87186 SC STD MICRODIL/AGAR DIL: CPT

## 2024-05-17 PROCEDURE — 3079F DIAST BP 80-89 MM HG: CPT | Performed by: NURSE PRACTITIONER

## 2024-05-17 PROCEDURE — 71045 X-RAY EXAM CHEST 1 VIEW: CPT

## 2024-05-17 RX ORDER — CIPROFLOXACIN 250 MG/1
250 TABLET, FILM COATED ORAL 2 TIMES DAILY
Qty: 14 TABLET | Refills: 0 | Status: SHIPPED | OUTPATIENT
Start: 2024-05-17 | End: 2024-05-24

## 2024-05-17 RX ORDER — METOPROLOL SUCCINATE 25 MG/1
25 TABLET, EXTENDED RELEASE ORAL DAILY
Status: DISCONTINUED | OUTPATIENT
Start: 2024-05-17 | End: 2024-05-17

## 2024-05-17 RX ORDER — METRONIDAZOLE 500 MG/1
500 TABLET ORAL 2 TIMES DAILY
Qty: 14 TABLET | Refills: 0 | Status: SHIPPED | OUTPATIENT
Start: 2024-05-17 | End: 2024-05-24

## 2024-05-17 RX ORDER — METOPROLOL SUCCINATE 25 MG/1
25 TABLET, EXTENDED RELEASE ORAL DAILY
Status: DISCONTINUED | OUTPATIENT
Start: 2024-05-17 | End: 2024-05-17 | Stop reason: HOSPADM

## 2024-05-17 ASSESSMENT — ENCOUNTER SYMPTOMS
DIARRHEA: 0
FACIAL ASYMMETRY: 0
NAUSEA: 0
NERVOUS/ANXIOUS: 0
CHILLS: 0
HYPERTENSION: 1
UNEXPECTED WEIGHT CHANGE: 0
HEMATURIA: 0
VOMITING: 0
WOUND: 0
BLOOD IN STOOL: 0
MYALGIAS: 0
CONFUSION: 0
SPEECH DIFFICULTY: 0
HEADACHES: 0
ARTHRALGIAS: 0
BACK PAIN: 0
DIZZINESS: 0
FEVER: 0
TROUBLE SWALLOWING: 0
EYE REDNESS: 0
PHOTOPHOBIA: 0
COUGH: 0
SORE THROAT: 0
FLANK PAIN: 0
ABDOMINAL DISTENTION: 0
CHEST TIGHTNESS: 0
FREQUENCY: 0
ABDOMINAL PAIN: 1
DYSURIA: 0
JOINT SWELLING: 0
NUMBNESS: 0
WEAKNESS: 0
PALPITATIONS: 0
CONSTIPATION: 0
SEIZURES: 0
EYE PAIN: 0
APNEA: 0
FATIGUE: 0
DIFFICULTY URINATING: 0
WHEEZING: 0
SLEEP DISTURBANCE: 0
CHOKING: 0
NECK PAIN: 0
SHORTNESS OF BREATH: 0

## 2024-05-17 ASSESSMENT — COLUMBIA-SUICIDE SEVERITY RATING SCALE - C-SSRS
2. HAVE YOU ACTUALLY HAD ANY THOUGHTS OF KILLING YOURSELF?: NO
6. HAVE YOU EVER DONE ANYTHING, STARTED TO DO ANYTHING, OR PREPARED TO DO ANYTHING TO END YOUR LIFE?: NO
1. IN THE PAST MONTH, HAVE YOU WISHED YOU WERE DEAD OR WISHED YOU COULD GO TO SLEEP AND NOT WAKE UP?: NO

## 2024-05-17 ASSESSMENT — PAIN DESCRIPTION - DESCRIPTORS: DESCRIPTORS: TIGHTNESS

## 2024-05-17 ASSESSMENT — PATIENT HEALTH QUESTIONNAIRE - PHQ9
1. LITTLE INTEREST OR PLEASURE IN DOING THINGS: NOT AT ALL
2. FEELING DOWN, DEPRESSED OR HOPELESS: NOT AT ALL
SUM OF ALL RESPONSES TO PHQ9 QUESTIONS 1 AND 2: 0

## 2024-05-17 ASSESSMENT — PAIN DESCRIPTION - LOCATION: LOCATION: CHEST

## 2024-05-17 ASSESSMENT — PAIN SCALES - GENERAL: PAINLEVEL_OUTOF10: 2

## 2024-05-17 ASSESSMENT — PAIN DESCRIPTION - FREQUENCY: FREQUENCY: CONSTANT/CONTINUOUS

## 2024-05-17 ASSESSMENT — PAIN DESCRIPTION - ONSET: ONSET: ONGOING

## 2024-05-17 ASSESSMENT — PAIN - FUNCTIONAL ASSESSMENT: PAIN_FUNCTIONAL_ASSESSMENT: 0-10

## 2024-05-17 ASSESSMENT — PAIN DESCRIPTION - PAIN TYPE: TYPE: ACUTE PAIN

## 2024-05-17 NOTE — LETTER
May 17, 2024     Patient: Gallo Schmidt   YOB: 1960   Date of Visit: 5/17/2024       To Whom It May Concern:    Gallo Schmidt was seen in my clinic on 5/17/2024. She is able to return to work 5/18/24. If you have any questions or concerns, please don't hesitate to call.         Sincerely,         Bonita Nguyễn, JHON-CNP

## 2024-05-17 NOTE — ED PROVIDER NOTES
Patient is a 64-year-old female who was at work and started to have some chest discomfort followed by some dizziness.  Blood pressure taken at work was 208/90.  She does have a history of hypertension.  States she did not miss any medications.  She is scheduled to take her metoprolol in approximately 1 hour.  Denies any shortness of breath.  No radiating symptoms to the extremity neck or jaw.         Review of Systems     Physical Exam  Vitals and nursing note reviewed.   Constitutional:       General: She is not in acute distress.     Appearance: She is well-developed.   HENT:      Head: Normocephalic and atraumatic.   Eyes:      Conjunctiva/sclera: Conjunctivae normal.   Cardiovascular:      Rate and Rhythm: Normal rate and regular rhythm.      Heart sounds: No murmur heard.  Pulmonary:      Effort: Pulmonary effort is normal. No respiratory distress.      Breath sounds: Normal breath sounds.   Abdominal:      Palpations: Abdomen is soft.      Tenderness: There is no abdominal tenderness.   Musculoskeletal:         General: No swelling.      Cervical back: Neck supple.   Skin:     General: Skin is warm and dry.      Capillary Refill: Capillary refill takes less than 2 seconds.   Neurological:      Mental Status: She is alert.   Psychiatric:         Mood and Affect: Mood normal.          Labs Reviewed   CBC WITH AUTO DIFFERENTIAL - Abnormal       Result Value    WBC 6.8      nRBC 0.0      RBC 4.32      Hemoglobin 13.3      Hematocrit 42.0      MCV 97      MCH 30.8      MCHC 31.7 (*)     RDW 13.2      Platelets 244      Neutrophils % 71.0      Immature Granulocytes %, Automated 0.6      Lymphocytes % 17.6      Monocytes % 6.8      Eosinophils % 3.6      Basophils % 0.4      Neutrophils Absolute 4.80      Immature Granulocytes Absolute, Automated 0.04      Lymphocytes Absolute 1.19 (*)     Monocytes Absolute 0.46      Eosinophils Absolute 0.24      Basophils Absolute 0.03     BASIC METABOLIC PANEL - Normal    Glucose  93      Sodium 141      Potassium 3.8      Chloride 105      Bicarbonate 27      Anion Gap 13      Urea Nitrogen 22      Creatinine 0.87      eGFR 75      Calcium 9.2     SERIAL TROPONIN-INITIAL - Normal    Troponin I, High Sensitivity 5      Narrative:     Less than 99th percentile of normal range cutoff-  Female and children under 18 years old <14 ng/L; Male <21 ng/L: Negative  Repeat testing should be performed if clinically indicated.     Female and children under 18 years old 14-50 ng/L; Male 21-50 ng/L:  Consistent with possible cardiac damage and possible increased clinical   risk. Serial measurements may help to assess extent of myocardial damage.     >50 ng/L: Consistent with cardiac damage, increased clinical risk and  myocardial infarction. Serial measurements may help assess extent of   myocardial damage.      NOTE: Children less than 1 year old may have higher baseline troponin   levels and results should be interpreted in conjunction with the overall   clinical context.     NOTE: Troponin I testing is performed using a different   testing methodology at [facility] than at [facility]. Direct result comparisons should only   be made within the same method.   SERIAL TROPONIN, 1 HOUR - Normal    Troponin I, High Sensitivity 5      Narrative:     Less than 99th percentile of normal range cutoff-  Female and children under 18 years old <14 ng/L; Male <21 ng/L: Negative  Repeat testing should be performed if clinically indicated.     Female and children under 18 years old 14-50 ng/L; Male 21-50 ng/L:  Consistent with possible cardiac damage and possible increased clinical   risk. Serial measurements may help to assess extent of myocardial damage.     >50 ng/L: Consistent with cardiac damage, increased clinical risk and  myocardial infarction. Serial measurements may help assess extent of   myocardial damage.      NOTE: Children less than 1 year old may have higher baseline troponin    levels and results should be interpreted in conjunction with the overall   clinical context.     NOTE: Troponin I testing is performed using a different   testing methodology at [facility] than at other   [facility] hospitals. Direct result comparisons should only   be made within the same method.   TROPONIN SERIES- (INITIAL, 1 HR)    Narrative:     The following orders were created for panel order Troponin I Series, High Sensitivity (0, 1 HR).  Procedure                               Abnormality         Status                     ---------                               -----------         ------                     Troponin I, High Sensiti...[[id number]]  Normal              Final result               Troponin, High Sensitivi...[[id number]]  Normal              Final result                 Please view results for these tests on the individual orders.        XR chest 1 view   Final Result   No acute cardiopulmonary process.             MACRO:   None.        Signed by: Malcom Scott 5/17/2024 3:28 AM   Dictation workstation:   [id number]           Procedures     Medical Decision Making  Patient is a 64-year-old female who was at work and started to have some chest discomfort followed by some dizziness.  Blood pressure taken at work was 208/90.  She does have a history of hypertension.  States she did not miss any medications.  She is scheduled to take her metoprolol in approximately 1 hour.  Denies any shortness of breath.  No radiating symptoms to the extremity neck or jaw.  Upon arrival IV access was cleaned and she was placed on a monitor.  EKG demonstrated sinus rhythm with a rate of 68.  Patient's initial and repeat troponin were negative.  Chest x-ray was negative.  Hemoglobin stable.  Electrolytes normal as well.  I initially ordered the patient's morning dose of metoprolol however the patient is blood pressure was improving and it was not given.  Last documented blood pressure was 137/46.   Patient can be discharged home.    DDx: Hypertensive emergency, hypertension, ACS, STEMI    Amount and/or Complexity of Data Reviewed  ECG/medicine tests: independent interpretation performed.     Details: Sinus rhythm rate of 68, narrow complex, left axis deviation, no ST elevation or depression, no ectopy.         Diagnoses as of 05/17/24 0419   Hypertension, unspecified type   Chest pain, unspecified type                    Winston Cerna MD  05/17/24 0411

## 2024-05-17 NOTE — PROGRESS NOTES
"Subjective   Patient ID: Gallo Schmidt is a 64 y.o. female who presents for Hypertension (Patient is here today in office for bp check ).      HPI:  Presents today for [facility] FU FOR HTN. SHE WAS AT WORK AND HER BP /112.  C/O RIGHT SIDED PELVIC PAIN AND BROWNISH DISCHARGE WITH URINE X A FEW DAYS modifying factors consists of STATES THIS SYMPTOM OCCURS WITH DIVERTICULITIS  IN THE PAST associated symptoms consist of RLQ ABD PAIN  prior treatment consists of medication NONE    HTN- STABLE AT VISIT. HOME BP CUFF MATCHES OUR BP. MONITOR AT HOME     Visit Vitals  /82 (BP Location: Right arm, Patient Position: Sitting)   Pulse 87   Ht 1.499 m (4' 11\")   Wt 71.2 kg (157 lb)   BMI 31.71 kg/m²   Smoking Status Former   BSA 1.72 m²        Review of Systems   Constitutional:  Negative for chills, fatigue, fever and unexpected weight change.   HENT:  Negative for congestion, ear pain, sore throat and trouble swallowing.    Eyes:  Negative for photophobia, pain, redness and visual disturbance.   Respiratory:  Negative for apnea, cough, choking, chest tightness, shortness of breath and wheezing.    Cardiovascular:  Negative for chest pain, palpitations and leg swelling.   Gastrointestinal:  Positive for abdominal pain. Negative for abdominal distention, blood in stool, constipation, diarrhea, nausea and vomiting.   Genitourinary:  Positive for pelvic pain. Negative for difficulty urinating, dysuria, flank pain, frequency, hematuria and urgency.   Musculoskeletal:  Negative for arthralgias, back pain, gait problem, joint swelling, myalgias and neck pain.   Skin:  Negative for rash and wound.   Neurological:  Negative for dizziness, seizures, syncope, facial asymmetry, speech difficulty, weakness, numbness and headaches.   Psychiatric/Behavioral:  Negative for confusion, sleep disturbance and suicidal ideas. The patient is not nervous/anxious.        Objective     Physical Exam  Constitutional:       Appearance: " Normal appearance. She is normal weight.   HENT:      Head: Normocephalic.   Eyes:      Extraocular Movements: Extraocular movements intact.      Conjunctiva/sclera: Conjunctivae normal.      Pupils: Pupils are equal, round, and reactive to light.   Cardiovascular:      Rate and Rhythm: Normal rate and regular rhythm.      Pulses: Normal pulses.      Heart sounds: Normal heart sounds.   Pulmonary:      Effort: Pulmonary effort is normal.      Breath sounds: Normal breath sounds.   Abdominal:      General: Bowel sounds are normal.      Tenderness: There is abdominal tenderness (RLQ, RIGHT PELVIC PAIN).   Musculoskeletal:         General: Normal range of motion.      Cervical back: Normal range of motion.   Skin:     General: Skin is warm and dry.   Neurological:      General: No focal deficit present.      Mental Status: She is alert and oriented to person, place, and time.   Psychiatric:         Mood and Affect: Mood normal.         Behavior: Behavior normal.         Thought Content: Thought content normal.         Judgment: Judgment normal.            Assessment/Plan   Problem List Items Addressed This Visit       Pelvic pain - Primary    Relevant Orders    POCT UA Automated manually resulted (Completed)    Urine Culture    Diverticulitis    Relevant Medications    ciprofloxacin (Cipro) 250 mg tablet    metroNIDAZOLE (Flagyl) 500 mg tablet   REFUSING ABD US AND PELVIC US ALONG WITH FU APPT AT THIS TIME. CALL NEXT WEEK WITH UPDATE. URINE CULTURE SENT. WILL START CIPRO AND FLAGYL TO COVER FOR BOTH DIVERTICULITIS AND UTI. SHE DOES HAVE AN APPT WITH HER PCP ON 6/10/24    WE DISCUSSED MOST COMMON SIDE EFFECTS OF PRESCRIBED MEDICATIONS. INDICATIONS, RISK, COMPLICATIONS, AND ALTERNATIVES OF MEDICATION/THERAPEUTICS WERE EXPLAINED AND DISCUSSED. PLEASE MONITOR CLOSELY FOR ANY UNTOWARD SIDE EFFECTS OR COMPLICATIONS OF MEDICATIONS. PATIENT IS STRONGLY ADVISED TO BE COMPLIANT WITH RECOMMENDATIONS. QUESTIONS AND CONCERNS WERE  ADDRESSED. INSTRUCTED TO CALL, RETURN SOONER, OR GO TO THE ER,  IF SYMPTOMS PERSIST OR WORSEN. THEY VOICED UNDERSTANDING AND  DENIES FURTHER QUESTIONS AT THIS TIME.    TIME CODE  1. PREPARATION FOR PATIENT'S VISIT (REVIEWING CHART, CURRENT MEDICAL RECORDS, OUTSIDE HEALTH PROVIDER RECORDS, PREVIOUS HISTORY, EXAM, TEST, PROCEDURE, AND MEDICATIONS)  2. FACE TO FACE ENCOUNTER OBTAINING HISTORY FROM THE PATIENT/FAMILY/CAREGIVERS; PERFORMING EVALUATION AND EXAMINATION; ORDERING TESTS OR PROCEDURES; REFERRING AND COMMUNICATING WITH OTHER HEALTHCARE PROVIDERS; COUNSELING AND EDUCATION OF THE PATIENT/FAMILY/CAREGIVERS; INDEPENDENTLY INTERPRETING RESULTS (TESTS, LABS, PROCEDURES, IMAGING) AND COMMUNICATING AND EXPLAINING RESULTS TO THE PATIENT/FAMILY/CAREGIVERS  3. COORDINATION OF CARE; PREPARING AND PRINTING DISCHARGE INSTRUCTIONS AND ANY EDUCATIONAL MATERIAL FOR THE PATIENT/FAMILY/CAREGIVERS. DOCUMENTING CLINICAL INFORMATION IN THE ELECTRONIC MEDICAL RECORD   4. REVIEWING OARRS AS NEEDED    MDM  1) COMPLEXITY: MORE THAN 1 STABLE CHRONIC CONDITION ADDRESSED OR 1 ACUTE ILLNESS ADDRESSED   2)DATA: TESTS INTERPRETED AND OR ORDERED, TOOK INDEPENDENT HISTORY OR RECORDS REVIEWED  3)RISK: MODERATE RISK DUE TO NATURE OF MEDICAL CONDITIONS/COMORBIDITY OR MEDICATIONS ORDERED OR SURGICAL OR PROCEDURE REFERRAL      Follow up as before IN JUNE

## 2024-05-20 LAB — BACTERIA UR CULT: ABNORMAL

## 2024-05-22 LAB
ATRIAL RATE: 68 BPM
P AXIS: 63 DEGREES
P OFFSET: 187 MS
P ONSET: 138 MS
PR INTERVAL: 156 MS
Q ONSET: 216 MS
QRS COUNT: 11 BEATS
QRS DURATION: 90 MS
QT INTERVAL: 396 MS
QTC CALCULATION(BAZETT): 421 MS
QTC FREDERICIA: 413 MS
R AXIS: -29 DEGREES
T AXIS: 16 DEGREES
T OFFSET: 414 MS
VENTRICULAR RATE: 68 BPM

## 2024-05-31 ENCOUNTER — LAB (OUTPATIENT)
Dept: LAB | Facility: LAB | Age: 64
End: 2024-05-31
Payer: COMMERCIAL

## 2024-05-31 DIAGNOSIS — E78.00 HYPERCHOLESTEREMIA: ICD-10-CM

## 2024-05-31 DIAGNOSIS — R79.89 LOW VITAMIN B12 LEVEL: ICD-10-CM

## 2024-05-31 DIAGNOSIS — I10 PRIMARY HYPERTENSION: ICD-10-CM

## 2024-05-31 LAB
ALBUMIN SERPL BCP-MCNC: 3.6 G/DL (ref 3.4–5)
ALP SERPL-CCNC: 102 U/L (ref 33–136)
ALT SERPL W P-5'-P-CCNC: 21 U/L (ref 7–45)
ANION GAP SERPL CALC-SCNC: 9 MMOL/L (ref 10–20)
AST SERPL W P-5'-P-CCNC: 25 U/L (ref 9–39)
BASOPHILS # BLD AUTO: 0.05 X10*3/UL (ref 0–0.1)
BASOPHILS NFR BLD AUTO: 1 %
BILIRUB SERPL-MCNC: 0.5 MG/DL (ref 0–1.2)
BUN SERPL-MCNC: 15 MG/DL (ref 6–23)
CALCIUM SERPL-MCNC: 9.5 MG/DL (ref 8.6–10.3)
CHLORIDE SERPL-SCNC: 103 MMOL/L (ref 98–107)
CHOLEST SERPL-MCNC: 232 MG/DL (ref 0–199)
CHOLESTEROL/HDL RATIO: 2.9
CO2 SERPL-SCNC: 30 MMOL/L (ref 21–32)
CREAT SERPL-MCNC: 0.8 MG/DL (ref 0.5–1.05)
EGFRCR SERPLBLD CKD-EPI 2021: 83 ML/MIN/1.73M*2
EOSINOPHIL # BLD AUTO: 0.19 X10*3/UL (ref 0–0.7)
EOSINOPHIL NFR BLD AUTO: 3.9 %
ERYTHROCYTE [DISTWIDTH] IN BLOOD BY AUTOMATED COUNT: 13.7 % (ref 11.5–14.5)
GLUCOSE SERPL-MCNC: 82 MG/DL (ref 74–99)
HCT VFR BLD AUTO: 41 % (ref 36–46)
HDLC SERPL-MCNC: 81 MG/DL
HGB BLD-MCNC: 12.7 G/DL (ref 12–16)
IMM GRANULOCYTES # BLD AUTO: 0.03 X10*3/UL (ref 0–0.7)
IMM GRANULOCYTES NFR BLD AUTO: 0.6 % (ref 0–0.9)
LDLC SERPL CALC-MCNC: 142 MG/DL
LYMPHOCYTES # BLD AUTO: 0.99 X10*3/UL (ref 1.2–4.8)
LYMPHOCYTES NFR BLD AUTO: 20.5 %
MAGNESIUM SERPL-MCNC: 1.96 MG/DL (ref 1.6–2.4)
MCH RBC QN AUTO: 30.6 PG (ref 26–34)
MCHC RBC AUTO-ENTMCNC: 31 G/DL (ref 32–36)
MCV RBC AUTO: 99 FL (ref 80–100)
MONOCYTES # BLD AUTO: 0.38 X10*3/UL (ref 0.1–1)
MONOCYTES NFR BLD AUTO: 7.9 %
NEUTROPHILS # BLD AUTO: 3.18 X10*3/UL (ref 1.2–7.7)
NEUTROPHILS NFR BLD AUTO: 66.1 %
NON HDL CHOLESTEROL: 151 MG/DL (ref 0–149)
NRBC BLD-RTO: 0 /100 WBCS (ref 0–0)
PLATELET # BLD AUTO: 314 X10*3/UL (ref 150–450)
POTASSIUM SERPL-SCNC: 4.2 MMOL/L (ref 3.5–5.3)
PROT SERPL-MCNC: 6.1 G/DL (ref 6.4–8.2)
RBC # BLD AUTO: 4.15 X10*6/UL (ref 4–5.2)
SODIUM SERPL-SCNC: 138 MMOL/L (ref 136–145)
T4 FREE SERPL-MCNC: 1.08 NG/DL (ref 0.61–1.12)
TRIGL SERPL-MCNC: 46 MG/DL (ref 0–149)
TSH SERPL-ACNC: 2.71 MIU/L (ref 0.44–3.98)
VIT B12 SERPL-MCNC: 594 PG/ML (ref 211–911)
VLDL: 9 MG/DL (ref 0–40)
WBC # BLD AUTO: 4.8 X10*3/UL (ref 4.4–11.3)

## 2024-05-31 PROCEDURE — 36415 COLL VENOUS BLD VENIPUNCTURE: CPT

## 2024-05-31 PROCEDURE — 82607 VITAMIN B-12: CPT

## 2024-05-31 PROCEDURE — 83735 ASSAY OF MAGNESIUM: CPT

## 2024-05-31 PROCEDURE — 84443 ASSAY THYROID STIM HORMONE: CPT

## 2024-05-31 PROCEDURE — 80053 COMPREHEN METABOLIC PANEL: CPT

## 2024-05-31 PROCEDURE — 80061 LIPID PANEL: CPT

## 2024-05-31 PROCEDURE — 84439 ASSAY OF FREE THYROXINE: CPT

## 2024-05-31 PROCEDURE — 85025 COMPLETE CBC W/AUTO DIFF WBC: CPT

## 2024-06-10 ENCOUNTER — OFFICE VISIT (OUTPATIENT)
Dept: PRIMARY CARE | Facility: CLINIC | Age: 64
End: 2024-06-10
Payer: COMMERCIAL

## 2024-06-10 ENCOUNTER — TELEPHONE (OUTPATIENT)
Dept: PRIMARY CARE | Facility: CLINIC | Age: 64
End: 2024-06-10

## 2024-06-10 VITALS
OXYGEN SATURATION: 94 % | DIASTOLIC BLOOD PRESSURE: 80 MMHG | SYSTOLIC BLOOD PRESSURE: 129 MMHG | HEART RATE: 71 BPM | WEIGHT: 159.2 LBS | BODY MASS INDEX: 32.09 KG/M2 | HEIGHT: 59 IN

## 2024-06-10 DIAGNOSIS — I10 PRIMARY HYPERTENSION: ICD-10-CM

## 2024-06-10 DIAGNOSIS — E78.00 HYPERCHOLESTEREMIA: Primary | ICD-10-CM

## 2024-06-10 DIAGNOSIS — Z87.891 PERSONAL HISTORY OF NICOTINE DEPENDENCE: ICD-10-CM

## 2024-06-10 DIAGNOSIS — R91.1 LUNG NODULE: ICD-10-CM

## 2024-06-10 DIAGNOSIS — E83.41 HYPERMAGNESEMIA: ICD-10-CM

## 2024-06-10 DIAGNOSIS — M85.80 OSTEOPENIA, UNSPECIFIED LOCATION: ICD-10-CM

## 2024-06-10 DIAGNOSIS — M25.50 POLYARTHRALGIA: ICD-10-CM

## 2024-06-10 PROCEDURE — 3074F SYST BP LT 130 MM HG: CPT | Performed by: PHYSICIAN ASSISTANT

## 2024-06-10 PROCEDURE — 99214 OFFICE O/P EST MOD 30 MIN: CPT | Performed by: PHYSICIAN ASSISTANT

## 2024-06-10 PROCEDURE — 1036F TOBACCO NON-USER: CPT | Performed by: PHYSICIAN ASSISTANT

## 2024-06-10 PROCEDURE — 3079F DIAST BP 80-89 MM HG: CPT | Performed by: PHYSICIAN ASSISTANT

## 2024-06-10 RX ORDER — ATORVASTATIN CALCIUM 80 MG/1
80 TABLET, FILM COATED ORAL DAILY
Qty: 100 TABLET | Refills: 3 | Status: SHIPPED | OUTPATIENT
Start: 2024-06-10 | End: 2025-07-15

## 2024-06-10 ASSESSMENT — ENCOUNTER SYMPTOMS
FEVER: 0
RHINORRHEA: 0
NUMBNESS: 0
TREMORS: 0
SLEEP DISTURBANCE: 0
FREQUENCY: 0
DIZZINESS: 0
BACK PAIN: 1
WHEEZING: 0
FLANK PAIN: 0
SHORTNESS OF BREATH: 1
BRUISES/BLEEDS EASILY: 0
DIARRHEA: 0
SINUS PAIN: 0
VOMITING: 0
ABDOMINAL PAIN: 0
MYALGIAS: 1
WOUND: 0
CONFUSION: 0
ARTHRALGIAS: 1
EYE REDNESS: 0
SORE THROAT: 0
PALPITATIONS: 0
EYE DISCHARGE: 0
COUGH: 1
CHEST TIGHTNESS: 0
CHILLS: 0
NAUSEA: 0
FATIGUE: 1
CONSTIPATION: 0
HEADACHES: 0
NECK PAIN: 0

## 2024-06-10 NOTE — PROGRESS NOTES
Subjective   Patient ID: Gallo Schmidt is a 64 y.o. female who presents for Follow-up (6 MONTH FOLLOW UP WITH LABS. DUE FOR CT SCAN FOLLOW UP)    HPI    Labs     Neck pain /heaviness radiating down to L arm but states is happening when she is moving her arms at work to think this is more M/S than cardio   Xray - known OA   Consider xray shoulder   Consider PT and additional imaging or EMG but pt declines at this time - will call if she changes her mind   She is set to follow with cardio in a few months      Med check   allergies - taking zyrtec and flonase  HTN - stable on meds   polyarthralgia/OA - following with ortho -dr Hoover   contract DERM - using topical but cont to struggle bc of work exposure with the oil   COPD - inhalers - following with pulm   VIJAY- on CPAP   Chronic cystitis /hematuria and risks for vesico - colonic fistula - she saw uro in nov and was set for additional testing and to follow up after but she never did.  She cont to note symptoms on and off - suggest she re-schedule visit with GYN       Preventative Testing      DEXA - dec 2023 - osteopenia   mammo - nov 2023  colonoscopy - 2018 - repeat in 10 years   CT chest done June 2023 - repeat in 1 year FU lung cancer screen   She is following with pulm now  as well - next visit set later this month   Pt states she quit smoking in her earlier 50s   She smoked about 1-2 ppd from age 18 to about 52 so she smoked 35 +pack year history      Fall - Neg -June 2024   PHQ2 Neg June 2024       Patient Active Problem List   Diagnosis    Bilateral leg edema    Chronic pain of left knee    CMC arthritis    Contact dermatitis    COPD (chronic obstructive pulmonary disease) (Multi)    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    Diuretic-induced hypokalemia    GERD (gastroesophageal reflux disease)    Hypercholesteremia    Hyperkalemia    Hypermagnesemia    Hypertension    Impingement syndrome of right shoulder    Lung nodule     Medial epicondylitis    Obstructive sleep apnea, adult    Polyarthralgia    SOB (shortness of breath) on exertion    Cervicalgia    Chronic bilateral low back pain without sciatica    Mid back pain    Fatigue    Heart palpitations    Muscle cramp    Degenerative disc disease, cervical    Degenerative disc disease, thoracic    Degenerative disc disease at L5-S1 level    Pelvic pain    Diverticulitis       Review of Systems   Constitutional:  Positive for fatigue. Negative for chills and fever.   HENT:  Negative for congestion, rhinorrhea, sinus pain, sore throat and tinnitus.    Eyes:  Negative for discharge, redness and visual disturbance.   Respiratory:  Positive for cough and shortness of breath. Negative for chest tightness and wheezing.    Cardiovascular:  Negative for chest pain, palpitations and leg swelling.   Gastrointestinal:  Negative for abdominal pain, constipation, diarrhea, nausea and vomiting.   Endocrine: Negative for cold intolerance and heat intolerance.   Genitourinary:  Negative for flank pain, frequency and urgency.   Musculoskeletal:  Positive for arthralgias, back pain and myalgias. Negative for gait problem and neck pain.   Skin:  Negative for rash and wound.   Neurological:  Negative for dizziness, tremors, syncope, numbness and headaches.   Hematological:  Does not bruise/bleed easily.   Psychiatric/Behavioral:  Negative for confusion, sleep disturbance and suicidal ideas.        Past Medical History:   Diagnosis Date    Acute exacerbation of chronic bronchitis (Multi) 2023    COPD (chronic obstructive pulmonary disease) (Multi)     Cough 2023    Diverticulitis     Hypertension     Procedure and treatment not carried out because of patient's decision for unspecified reasons 2021    Mammogram declined    Wheezing on auscultation 2023       Past Surgical History:   Procedure Laterality Date     SECTION, CLASSIC      COLONOSCOPY  2022    REPEAT 5 YRS FOR  POLYPS    CT GUIDED ABSCESS FLUID COLLECTION DRAINAGE  2022    CT GUIDED ABSCESS FLUID COLLECTION DRAINAGE 2022 [facility]PAT[facility]GAC[facility]    OVARIAN CYST REMOVAL         Family History   Problem Relation Name Age of Onset    Hypertension Mother      Stroke Father      COPD Father      Hypertension Father      Other (PERIPHERAL ARTERIAL DISEASE) Father      Thyroid disease Father      Heart attack Brother      Stomach cancer Maternal Grandmother      Millwood's disease Paternal Grandmother         Social History     Tobacco Use    Smoking status: Former     Current packs/day: 0.00     Average packs/day: 1.5 packs/day for 33.0 years (49.5 ttl pk-yrs)     Types: Cigarettes     Start date: 1978     Quit date: 2011     Years since quittin.4     Passive exposure: Current    Smokeless tobacco: Never   Vaping Use    Vaping status: Never Used   Substance Use Topics    Alcohol use: Never    Drug use: Never       Allergies   Allergen Reactions    Lisinopril Cough and Other     Cough    Sulfa (Sulfonamide Antibiotics) Rash       Current Outpatient Medications   Medication Sig Dispense Refill    albuterol sulfate (Proair Digihaler) 90 mcg/actuation aero powdr breath act w/sensor inhaler Inhale 2 puffs every 6 hours if needed for shortness of breath or wheezing.      aspirin 81 mg chewable tablet CHEW AND SWALLOW 1 TABLET BY MOUTH ONCE DAILY 90 tablet 3    Breztri Aerosphere 160-9-4.8 mcg/actuation HFA aerosol inhaler 2 puffs BID May have 90 day supply. 10.7 g 1    cholecalciferol (Vitamin D-3) 25 MCG (1000 UT) tablet Take 1 tablet (25 mcg) by mouth once daily.      cyanocobalamin (Vitamin B-12) 1,000 mcg tablet Take 1 tablet (1,000 mcg) by mouth once daily. 30 tablet 11    diclofenac sodium 1 % kit APPLY 2 GM 3 times daily PRN as needed for pain do not apply more than 8 grams per day      isosorbide mononitrate ER (Imdur) 30 mg 24 hr tablet Take 1 tablet (30 mg) by mouth once daily. 90 tablet 3    lactobacillus  "acidophilus & bulgar (Lactinex) 1 million cell chewable tablet Chew 1 tablet 3 times daily (morning, midday, late afternoon).      losartan (Cozaar) 25 mg tablet TAKE 1 TABLET (25 MG) BY MOUTH ONCE DAILY. 90 tablet 3    metoprolol succinate XL (Toprol-XL) 25 mg 24 hr tablet TAKE 1 TABLET (25 MG) BY MOUTH ONCE DAILY. 90 tablet 3    montelukast (Singulair) 10 mg tablet Take 1 tablet (10 mg) by mouth once daily at bedtime.      omeprazole OTC (PriLOSEC OTC) 20 mg EC tablet Take 1 tablet (20 mg) by mouth once daily.      potassium chloride (Klor-Con) 20 mEq packet DISSOLVE 1 PACKET (20 MEQ) IN FLUID AND DRINK BY MOUTH ONCE DAILY AS DIRECTED 90 packet 3    triamterene-hydrochlorothiazid (Maxzide-25) 37.5-25 mg tablet TAKE 1 TABLET BY MOUTH ONCE DAILY. 90 tablet 3    azelastine (Astelin) 137 mcg (0.1 %) nasal spray Administer 2 sprays into each nostril 2 times a day for 15 days. Use in each nostril as directed 30 mL 0     No current facility-administered medications for this visit.       Objective   /80 (BP Location: Right arm, Patient Position: Sitting)   Pulse 71   Ht 1.499 m (4' 11\")   Wt 72.2 kg (159 lb 3.2 oz)   SpO2 94%   BMI 32.15 kg/m²     Physical Exam  Vitals reviewed.   Constitutional:       Appearance: Normal appearance. She is obese.   HENT:      Head: Normocephalic.      Right Ear: External ear normal.      Left Ear: External ear normal.      Nose: Nose normal. No congestion or rhinorrhea.      Mouth/Throat:      Mouth: Mucous membranes are moist.   Eyes:      Extraocular Movements: Extraocular movements intact.      Conjunctiva/sclera: Conjunctivae normal.      Pupils: Pupils are equal, round, and reactive to light.   Cardiovascular:      Rate and Rhythm: Normal rate and regular rhythm.      Pulses: Normal pulses.   Pulmonary:      Effort: Pulmonary effort is normal.      Breath sounds: Normal breath sounds.   Abdominal:      General: Bowel sounds are normal.      Palpations: Abdomen is soft.     "  Tenderness: There is no abdominal tenderness. There is no right CVA tenderness or left CVA tenderness.   Musculoskeletal:         General: No tenderness. Normal range of motion.      Cervical back: Normal range of motion and neck supple. No tenderness.   Skin:     General: Skin is warm and dry.   Neurological:      General: No focal deficit present.      Mental Status: She is alert and oriented to person, place, and time.   Psychiatric:         Mood and Affect: Mood normal.         Behavior: Behavior normal.       Testing   Component      Latest Ref [facility] 5/31/2024   LEUKOCYTES (10*3/UL) IN BLOOD BY AUTOMATED COUNT, Guyanese      4.4 - 11.3 x10*3/uL 4.8    nRBC      0.0 - 0.0 /100 WBCs 0.0    ERYTHROCYTES (10*6/UL) IN BLOOD BY AUTOMATED COUNT, Guyanese      4.00 - 5.20 x10*6/uL 4.15    HEMOGLOBIN      12.0 - 16.0 g/dL 12.7    HEMATOCRIT      36.0 - 46.0 % 41.0    MCV      80 - 100 fL 99    MCH      26.0 - 34.0 pg 30.6    MCHC      32.0 - 36.0 g/dL 31.0 (L)    RED CELL DISTRIBUTION WIDTH      11.5 - 14.5 % 13.7    PLATELETS (10*3/UL) IN BLOOD AUTOMATED COUNT, Guyanese      150 - 450 x10*3/uL 314    NEUTROPHILS/100 LEUKOCYTES IN BLOOD BY AUTOMATED COUNT, Guyanese      40.0 - 80.0 % 66.1    Immature Granulocytes %, Automated      0.0 - 0.9 % 0.6    Lymphocytes %      13.0 - 44.0 % 20.5    Monocytes %      2.0 - 10.0 % 7.9    Eosinophils %      0.0 - 6.0 % 3.9    Basophils %      0.0 - 2.0 % 1.0    NEUTROPHILS (10*3/UL) IN BLOOD BY AUTOMATED COUNT, Guyanese      1.20 - 7.70 x10*3/uL 3.18    Immature Granulocytes Absolute, Automated      0.00 - 0.70 x10*3/uL 0.03    Lymphocytes Absolute      1.20 - 4.80 x10*3/uL 0.99 (L)    Monocytes Absolute      0.10 - 1.00 x10*3/uL 0.38    Eosinophils Absolute      0.00 - 0.70 x10*3/uL 0.19    Basophils Absolute      0.00 - 0.10 x10*3/uL 0.05    GLUCOSE      74 - 99 mg/dL 82    SODIUM      136 - 145 mmol/L 138    POTASSIUM      3.5 - 5.3 mmol/L 4.2    CHLORIDE      98 - 107 mmol/L 103     Bicarbonate      21 - 32 mmol/L 30    Anion Gap      10 - 20 mmol/L 9 (L)    Blood Urea Nitrogen      6 - 23 mg/dL 15    Creatinine      0.50 - 1.05 mg/dL 0.80    EGFR      >60 mL/min/1.73m*2 83    Calcium      8.6 - 10.3 mg/dL 9.5    Albumin      3.4 - 5.0 g/dL 3.6    Alkaline Phosphatase      33 - 136 U/L 102    Total Protein      6.4 - 8.2 g/dL 6.1 (L)    AST      9 - 39 U/L 25    Bilirubin Total      0.0 - 1.2 mg/dL 0.5    ALT      7 - 45 U/L 21    CHOLESTEROL      0 - 199 mg/dL 232 (H)    HDL CHOLESTEROL      mg/dL 81.0    Cholesterol/HDL Ratio 2.9    LDL Calculated      <=99 mg/dL 142 (H)    VLDL      0 - 40 mg/dL 9    TRIGLYCERIDES      0 - 149 mg/dL 46    Non HDL Cholesterol      0 - 149 mg/dL 151 (H)    Thyroid Stimulating Hormone      0.44 - 3.98 mIU/L 2.71    Thyroxine, Free      0.61 - 1.12 ng/dL 1.08    MAGNESIUM      1.60 - 2.40 mg/dL 1.96    Vitamin B12      211 - 911 pg/mL 594       Impression    MDM    1) COMPLEXITY: 1 UNDIAGNOSED NEW PROBLEM WITH UNCERTAIN PROGNOSIS  2)DATA: TESTS INTERPRETED AND OR ORDERED, TOOK INDEPENDENT HISTORY OR RECORDS REVIEWED  3)RISK: MODERATE RISK DUE TO NATURE OF MEDICAL CONDITIONS/COMORBIDITY OR MEDICATIONS ORDERED OR SURGICAL OR PROCEDURE REFERRAL, .       Reviewed labs and Testing on file   Patient to follow diet low in cholesterol, fat, and sodium.    Patient is advised to increase Exercise.  Patient is recommended to lose weight.  Reviewed Meds and discussed common side effects  Continue as directed   Suspected M/S complaints- suggested further work up but pt declines -will call if she changes her mind- see hPI   Low dose lung CT ordered - cont to follow with pulm   Cont to follow with cardio   I have inc her statin back from 40 -80 as she thinks it was a mistake going back to 40   Pt to follow with uro   Patient is strongly advised to be compliant with recommendations.    Return to Clinic sooner if needed.  Patient denies further questions/concerns at this time        Assessment/Plan   Problem List Items Addressed This Visit             ICD-10-CM    Hypercholesteremia - Primary E78.00    Relevant Medications    atorvastatin (Lipitor) 80 mg tablet    Other Relevant Orders    CBC and Auto Differential    Comprehensive Metabolic Panel    Lipid Panel    Magnesium    Vitamin B12    Vitamin D 25-Hydroxy,Total (for eval of Vitamin D levels)    Hypermagnesemia E83.41    Relevant Orders    Magnesium    Hypertension I10    Relevant Orders    CBC and Auto Differential    Comprehensive Metabolic Panel    Lipid Panel    Magnesium    Vitamin B12    Vitamin D 25-Hydroxy,Total (for eval of Vitamin D levels)    Lung nodule R91.1    Relevant Orders    CT lung screening low dose    Polyarthralgia M25.50     Other Visit Diagnoses         Codes    Osteopenia, unspecified location     M85.80    Relevant Orders    Vitamin D 25-Hydroxy,Total (for eval of Vitamin D levels)    Personal history of nicotine dependence     Z87.891    Relevant Orders    CT lung screening low dose          FU in 6 om with labs and med check   Low dose lung CT - 1 year check - to be done June 28 or after

## 2024-06-24 ENCOUNTER — APPOINTMENT (OUTPATIENT)
Dept: PULMONOLOGY | Facility: CLINIC | Age: 64
End: 2024-06-24
Payer: COMMERCIAL

## 2024-06-25 ENCOUNTER — TELEPHONE (OUTPATIENT)
Dept: PRIMARY CARE | Facility: CLINIC | Age: 64
End: 2024-06-25

## 2024-06-25 ENCOUNTER — OFFICE VISIT (OUTPATIENT)
Dept: PULMONOLOGY | Facility: CLINIC | Age: 64
End: 2024-06-25
Payer: COMMERCIAL

## 2024-06-25 VITALS
DIASTOLIC BLOOD PRESSURE: 75 MMHG | TEMPERATURE: 97.1 F | HEIGHT: 59 IN | OXYGEN SATURATION: 96 % | BODY MASS INDEX: 31.65 KG/M2 | HEART RATE: 65 BPM | SYSTOLIC BLOOD PRESSURE: 112 MMHG | WEIGHT: 157 LBS

## 2024-06-25 DIAGNOSIS — J41.8 MIXED SIMPLE AND MUCOPURULENT CHRONIC BRONCHITIS (MULTI): Primary | ICD-10-CM

## 2024-06-25 DIAGNOSIS — M25.512 ACUTE PAIN OF LEFT SHOULDER: Primary | ICD-10-CM

## 2024-06-25 DIAGNOSIS — R06.09 DYSPNEA ON EXERTION: ICD-10-CM

## 2024-06-25 PROCEDURE — 3078F DIAST BP <80 MM HG: CPT | Performed by: INTERNAL MEDICINE

## 2024-06-25 PROCEDURE — 3074F SYST BP LT 130 MM HG: CPT | Performed by: INTERNAL MEDICINE

## 2024-06-25 PROCEDURE — 99214 OFFICE O/P EST MOD 30 MIN: CPT | Performed by: INTERNAL MEDICINE

## 2024-06-25 PROCEDURE — 1036F TOBACCO NON-USER: CPT | Performed by: INTERNAL MEDICINE

## 2024-06-25 NOTE — PROGRESS NOTES
Subjective   Patient ID: Gallo Schmidt is a 64 y.o. female who presents for No chief complaint on file..  HPI  Patient was seen today in the office on a follow-up visit.  Patient reports that her breathing is better now with the cooler air outside.  She has minimal cough and no sputum production and some intermittent wheezing.  Her oxygen saturation on room air today was 95%.  Patient denies having dyspnea with walking in her house but does have some shortness of breath with going from her car to the office.  Patient is currently on Breztri at 2 inhalations twice a day and albuterol inhaler at 2 puffs 4 times a day.  She is using a spacer with both inhalers.  Review of Systems  She denies having any fever, chills, rhinitis or sore throat.  Objective   Physical Exam  HEENT, no inflammation noted on examination of the oropharynx.  Pulmonary, she has some decreased breath sounds and slight and expiratory wheeze noted primarily on the left side.  Cardio, heart sounds are regular rate and rhythm.  Extremities, no cyanosis, clubbing or pretibial edema.  Psych, the patient is alert and oriented x 3.  Patient does not appear dyspneic with activity in the office today  Assessment/Plan        Impressions:  1.  Chronic bronchitis.  2.  Dyspnea on exertion.  Recommendations:  1.  Stay on present bronchodilators with her spacer.  Breztri at twice a day 2 puffs and albuterol 2 puffs 4 times daily as needed shortness of breath.  2.  Follow-up with the patient in 4 months.  3.  I told the patient to call our office if she is having any new problems with her breathing in the interim.      This note was transcribed using the Dragon Dictation system.  There may be grammatical, punctuation, or verbiage errors that occur with voice recognition programs.    Obdulio Chakraborty, DO 06/25/24 3:29 PM

## 2024-06-26 ENCOUNTER — TELEPHONE (OUTPATIENT)
Dept: PULMONOLOGY | Facility: CLINIC | Age: 64
End: 2024-06-26
Payer: COMMERCIAL

## 2024-06-26 ENCOUNTER — HOSPITAL ENCOUNTER (OUTPATIENT)
Dept: RADIOLOGY | Facility: CLINIC | Age: 64
Discharge: HOME | End: 2024-06-26
Payer: COMMERCIAL

## 2024-06-26 DIAGNOSIS — M25.512 ACUTE PAIN OF LEFT SHOULDER: ICD-10-CM

## 2024-06-26 PROCEDURE — 73030 X-RAY EXAM OF SHOULDER: CPT | Mod: LEFT SIDE | Performed by: RADIOLOGY

## 2024-06-26 PROCEDURE — 73030 X-RAY EXAM OF SHOULDER: CPT | Mod: LT

## 2024-06-26 NOTE — TELEPHONE ENCOUNTER
Pt requesting a note to not work in area where Gardoclean is used.  Pt states its liquid that mists into the air and causes irritation.  Its used to prevent rust on steel.  Patient can wear a mask but it doesn't really help.  Pt advised per Dr. Chakraborty if its bugging her to discuss with management at work.  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  Possibly a carbon mask would be helpful.  Pt verbalized understanding.

## 2024-07-08 ENCOUNTER — APPOINTMENT (OUTPATIENT)
Dept: UROLOGY | Facility: CLINIC | Age: 64
End: 2024-07-08
Payer: COMMERCIAL

## 2024-07-11 ENCOUNTER — OFFICE VISIT (OUTPATIENT)
Dept: PRIMARY CARE | Facility: CLINIC | Age: 64
End: 2024-07-11
Payer: COMMERCIAL

## 2024-07-11 ENCOUNTER — HOSPITAL ENCOUNTER (EMERGENCY)
Facility: HOSPITAL | Age: 64
Discharge: HOME | End: 2024-07-11
Attending: EMERGENCY MEDICINE
Payer: COMMERCIAL

## 2024-07-11 VITALS
BODY MASS INDEX: 31.71 KG/M2 | HEART RATE: 67 BPM | RESPIRATION RATE: 18 BRPM | WEIGHT: 157 LBS | DIASTOLIC BLOOD PRESSURE: 78 MMHG | TEMPERATURE: 98.8 F | SYSTOLIC BLOOD PRESSURE: 93 MMHG | OXYGEN SATURATION: 94 %

## 2024-07-11 VITALS
SYSTOLIC BLOOD PRESSURE: 130 MMHG | OXYGEN SATURATION: 96 % | BODY MASS INDEX: 31.65 KG/M2 | HEIGHT: 59 IN | HEART RATE: 64 BPM | WEIGHT: 157 LBS | DIASTOLIC BLOOD PRESSURE: 74 MMHG

## 2024-07-11 DIAGNOSIS — K57.90 DIVERTICULOSIS: ICD-10-CM

## 2024-07-11 DIAGNOSIS — R11.0 NAUSEA: Primary | ICD-10-CM

## 2024-07-11 DIAGNOSIS — N32.1: Primary | ICD-10-CM

## 2024-07-11 LAB
ALBUMIN SERPL BCP-MCNC: 3.5 G/DL (ref 3.4–5)
ALP SERPL-CCNC: 112 U/L (ref 33–136)
ALT SERPL W P-5'-P-CCNC: 18 U/L (ref 7–45)
ANION GAP SERPL CALC-SCNC: 12 MMOL/L (ref 10–20)
APPEARANCE UR: ABNORMAL
AST SERPL W P-5'-P-CCNC: 21 U/L (ref 9–39)
BACTERIA #/AREA URNS AUTO: ABNORMAL /HPF
BASOPHILS # BLD AUTO: 0.01 X10*3/UL (ref 0–0.1)
BASOPHILS NFR BLD AUTO: 0.1 %
BILIRUB SERPL-MCNC: 0.5 MG/DL (ref 0–1.2)
BILIRUB UR STRIP.AUTO-MCNC: NEGATIVE MG/DL
BUN SERPL-MCNC: 17 MG/DL (ref 6–23)
CALCIUM SERPL-MCNC: 8.9 MG/DL (ref 8.6–10.3)
CHLORIDE SERPL-SCNC: 104 MMOL/L (ref 98–107)
CO2 SERPL-SCNC: 25 MMOL/L (ref 21–32)
COLOR UR: YELLOW
CREAT SERPL-MCNC: 0.92 MG/DL (ref 0.5–1.05)
EGFRCR SERPLBLD CKD-EPI 2021: 71 ML/MIN/1.73M*2
EOSINOPHIL # BLD AUTO: 0.27 X10*3/UL (ref 0–0.7)
EOSINOPHIL NFR BLD AUTO: 3.4 %
ERYTHROCYTE [DISTWIDTH] IN BLOOD BY AUTOMATED COUNT: 13.1 % (ref 11.5–14.5)
GLUCOSE SERPL-MCNC: 84 MG/DL (ref 74–99)
GLUCOSE UR STRIP.AUTO-MCNC: NORMAL MG/DL
HCT VFR BLD AUTO: 38.5 % (ref 36–46)
HGB BLD-MCNC: 12.4 G/DL (ref 12–16)
IMM GRANULOCYTES # BLD AUTO: 0.03 X10*3/UL (ref 0–0.7)
IMM GRANULOCYTES NFR BLD AUTO: 0.4 % (ref 0–0.9)
KETONES UR STRIP.AUTO-MCNC: NEGATIVE MG/DL
LEUKOCYTE ESTERASE UR QL STRIP.AUTO: ABNORMAL
LIPASE SERPL-CCNC: 35 U/L (ref 9–82)
LYMPHOCYTES # BLD AUTO: 0.28 X10*3/UL (ref 1.2–4.8)
LYMPHOCYTES NFR BLD AUTO: 3.6 %
MCH RBC QN AUTO: 30.6 PG (ref 26–34)
MCHC RBC AUTO-ENTMCNC: 32.2 G/DL (ref 32–36)
MCV RBC AUTO: 95 FL (ref 80–100)
MONOCYTES # BLD AUTO: 0.4 X10*3/UL (ref 0.1–1)
MONOCYTES NFR BLD AUTO: 5.1 %
MUCOUS THREADS #/AREA URNS AUTO: ABNORMAL /LPF
NEUTROPHILS # BLD AUTO: 6.85 X10*3/UL (ref 1.2–7.7)
NEUTROPHILS NFR BLD AUTO: 87.4 %
NITRITE UR QL STRIP.AUTO: NEGATIVE
NRBC BLD-RTO: 0 /100 WBCS (ref 0–0)
PH UR STRIP.AUTO: 6.5 [PH]
PLATELET # BLD AUTO: 239 X10*3/UL (ref 150–450)
POTASSIUM SERPL-SCNC: 4.3 MMOL/L (ref 3.5–5.3)
PROT SERPL-MCNC: 6 G/DL (ref 6.4–8.2)
PROT UR STRIP.AUTO-MCNC: ABNORMAL MG/DL
RBC # BLD AUTO: 4.05 X10*6/UL (ref 4–5.2)
RBC # UR STRIP.AUTO: ABNORMAL /UL
RBC #/AREA URNS AUTO: >20 /HPF
SODIUM SERPL-SCNC: 137 MMOL/L (ref 136–145)
SP GR UR STRIP.AUTO: 1.02
SQUAMOUS #/AREA URNS AUTO: ABNORMAL /HPF
UROBILINOGEN UR STRIP.AUTO-MCNC: NORMAL MG/DL
WBC # BLD AUTO: 7.8 X10*3/UL (ref 4.4–11.3)
WBC #/AREA URNS AUTO: >50 /HPF
WBC CLUMPS #/AREA URNS AUTO: ABNORMAL /HPF

## 2024-07-11 PROCEDURE — 81001 URINALYSIS AUTO W/SCOPE: CPT | Performed by: EMERGENCY MEDICINE

## 2024-07-11 PROCEDURE — 96374 THER/PROPH/DIAG INJ IV PUSH: CPT

## 2024-07-11 PROCEDURE — 85025 COMPLETE CBC W/AUTO DIFF WBC: CPT | Performed by: EMERGENCY MEDICINE

## 2024-07-11 PROCEDURE — 87086 URINE CULTURE/COLONY COUNT: CPT | Mod: SAMLAB | Performed by: EMERGENCY MEDICINE

## 2024-07-11 PROCEDURE — 83690 ASSAY OF LIPASE: CPT | Performed by: EMERGENCY MEDICINE

## 2024-07-11 PROCEDURE — 99214 OFFICE O/P EST MOD 30 MIN: CPT | Performed by: INTERNAL MEDICINE

## 2024-07-11 PROCEDURE — 3075F SYST BP GE 130 - 139MM HG: CPT | Performed by: INTERNAL MEDICINE

## 2024-07-11 PROCEDURE — 36415 COLL VENOUS BLD VENIPUNCTURE: CPT | Performed by: EMERGENCY MEDICINE

## 2024-07-11 PROCEDURE — 1036F TOBACCO NON-USER: CPT | Performed by: INTERNAL MEDICINE

## 2024-07-11 PROCEDURE — 80053 COMPREHEN METABOLIC PANEL: CPT | Performed by: EMERGENCY MEDICINE

## 2024-07-11 PROCEDURE — 3078F DIAST BP <80 MM HG: CPT | Performed by: INTERNAL MEDICINE

## 2024-07-11 PROCEDURE — 2500000004 HC RX 250 GENERAL PHARMACY W/ HCPCS (ALT 636 FOR OP/ED): Performed by: EMERGENCY MEDICINE

## 2024-07-11 PROCEDURE — 99284 EMERGENCY DEPT VISIT MOD MDM: CPT | Mod: 25

## 2024-07-11 PROCEDURE — 96361 HYDRATE IV INFUSION ADD-ON: CPT

## 2024-07-11 RX ORDER — METRONIDAZOLE 500 MG/1
500 TABLET ORAL
COMMUNITY
Start: 2024-07-10 | End: 2024-07-20

## 2024-07-11 RX ORDER — ONDANSETRON HYDROCHLORIDE 2 MG/ML
4 INJECTION, SOLUTION INTRAVENOUS ONCE
Status: COMPLETED | OUTPATIENT
Start: 2024-07-11 | End: 2024-07-11

## 2024-07-11 RX ORDER — MAGNESIUM GLUCONATE 27.5 (500)
TABLET ORAL
COMMUNITY
End: 2024-07-11 | Stop reason: ENTERED-IN-ERROR

## 2024-07-11 RX ORDER — CIPROFLOXACIN 500 MG/1
500 TABLET ORAL 2 TIMES DAILY
COMMUNITY
Start: 2024-07-10 | End: 2024-07-20

## 2024-07-11 RX ORDER — ONDANSETRON 4 MG/1
4 TABLET, ORALLY DISINTEGRATING ORAL EVERY 8 HOURS PRN
Qty: 15 TABLET | Refills: 0 | Status: SHIPPED | OUTPATIENT
Start: 2024-07-11

## 2024-07-11 RX ADMIN — SODIUM CHLORIDE 1000 ML: 9 INJECTION, SOLUTION INTRAVENOUS at 19:29

## 2024-07-11 RX ADMIN — ONDANSETRON 4 MG: 2 INJECTION INTRAMUSCULAR; INTRAVENOUS at 19:29

## 2024-07-11 ASSESSMENT — ENCOUNTER SYMPTOMS
HEMATOLOGIC/LYMPHATIC NEGATIVE: 1
EYE DISCHARGE: 0
ABDOMINAL DISTENTION: 0
JOINT SWELLING: 0
GASTROINTESTINAL NEGATIVE: 1
RESPIRATORY NEGATIVE: 1
PSYCHIATRIC NEGATIVE: 1
EYES NEGATIVE: 1
NECK STIFFNESS: 0
NUMBNESS: 0
CONSTIPATION: 0
NAUSEA: 0
CHILLS: 0
AGITATION: 0
PALPITATIONS: 0
CARDIOVASCULAR NEGATIVE: 1
HEADACHES: 0
LIGHT-HEADEDNESS: 0
DYSURIA: 0
NEUROLOGICAL NEGATIVE: 1
SHORTNESS OF BREATH: 0
MUSCULOSKELETAL NEGATIVE: 1
COUGH: 0
ALLERGIC/IMMUNOLOGIC NEGATIVE: 1
BACK PAIN: 0
VOMITING: 0
CONFUSION: 0
ADENOPATHY: 0
FEVER: 0
WHEEZING: 0
DIARRHEA: 0
ABDOMINAL PAIN: 0
CONSTITUTIONAL NEGATIVE: 1
ENDOCRINE NEGATIVE: 1

## 2024-07-11 ASSESSMENT — PAIN - FUNCTIONAL ASSESSMENT
PAIN_FUNCTIONAL_ASSESSMENT: 0-10
PAIN_FUNCTIONAL_ASSESSMENT: 0-10

## 2024-07-11 ASSESSMENT — PATIENT HEALTH QUESTIONNAIRE - PHQ9
2. FEELING DOWN, DEPRESSED OR HOPELESS: NOT AT ALL
1. LITTLE INTEREST OR PLEASURE IN DOING THINGS: NOT AT ALL
SUM OF ALL RESPONSES TO PHQ9 QUESTIONS 1 AND 2: 0

## 2024-07-11 ASSESSMENT — PAIN SCALES - GENERAL
PAINLEVEL_OUTOF10: 6
PAINLEVEL_OUTOF10: 0 - NO PAIN

## 2024-07-11 ASSESSMENT — PAIN DESCRIPTION - PROGRESSION: CLINICAL_PROGRESSION: RESOLVED

## 2024-07-11 NOTE — ED PROVIDER NOTES
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 She has had 3 doses since being prescribed and states her pain and nausea is worse, unsure if its from the antibiotic or worsening infection       Limitations to History: None    HPI: 64-year-old female presents with concern for nausea.  Patient has a known colovesicular fistula and was evaluated at [facility] yesterday for abdominal pain.  Patient placed on antibiotic therapy.  States the antibiotics are making her very nauseous.  Is due to be referred to colorectal surgery in [place].  [place] like she might have a fever earlier today but took some Motrin.  Denies any chest pain, shortness of breath, diarrhea, constipation.  Admits to stool in her urine intermittently.    Additional History Obtained from: Significant other at the bedside.    ------------------------------------------------------------------------------------------------------------------------------------------  Physical Exam:    VS: As documented in the triage note and EMR flowsheet from this visit were reviewed.    Appearance: Alert. cooperative,  in no acute distress.   Skin: Intact,  dry skin, no lesions, rash, petechiae or purpura.   Eyes: PERRLA, EOMs intact,  Conjunctiva pink with no redness or exudates.   HENT: Normocephalic, atraumatic. Nares patent. No intraoral lesions.   Neck: Supple, without meningismus. Trachea at midline. No lymphadenopathy.  Pulmonary: Clear bilaterally with good chest wall excursion. No rales, rhonchi or wheezing. No accessory muscle use or stridor.  Cardiac: Regular rate and rhythm, no rubs, murmurs, or gallops.   Abdomen: Abdomen is soft.  Tenderness to palpation right lower quadrant and mid lower abdomen.  Nondistended.  No palpable organomegaly.  No rebound or guarding.   No CVA tenderness. Nonsurgical abdomen.  Genitourinary: Exam deferred.  Musculoskeletal: Full range of motion.  Pulses full and equal. No cyanosis, clubbing, or edema.  Psychiatric: Appropriate mood and affect.                            No data recorded                   Patient History   Past Medical History:   Diagnosis Date    Acute exacerbation of chronic bronchitis (Multi) 2023    COPD (chronic obstructive pulmonary disease) (Multi)     Cough 2023    Diverticulitis     GERD (gastroesophageal reflux disease)     Hypertension     Lung nodule     Pneumonia     Procedure and treatment not carried out because of patient's decision for unspecified reasons 2021    Mammogram declined    Sleep apnea, obstructive     Wheezing on auscultation 2023     Past Surgical History:   Procedure Laterality Date     SECTION, CLASSIC      COLONOSCOPY  2022    REPEAT 5 YRS FOR POLYPS    CT GUIDED ABSCESS FLUID COLLECTION DRAINAGE  2022    CT GUIDED ABSCESS FLUID COLLECTION DRAINAGE 2022 [facility]PATIENT LEGACY    OVARIAN CYST REMOVAL       Family History   Problem Relation Name Age of Onset    Hypertension Mother Anita márquez     Stroke Father Rory márquez     COPD Father Rory márquez     Hypertension Father Rory márquez     Other (PERIPHERAL ARTERIAL DISEASE) Father Rory márquez     Thyroid disease Father Rory márquez     Heart attack Brother      Stomach cancer Maternal Grandmother      [place]'s disease Paternal Grandmother       Social History     Tobacco Use    Smoking status: Former     Current packs/day: 0.00     Average packs/day: 1.5 packs/day for 33.0 years (49.5 ttl pk-yrs)     Types: Cigarettes     Start date: 1978     Quit date: 2011     Years since quittin.5     Passive exposure: Current    Smokeless tobacco: Never   Vaping Use    Vaping status: Never Used   Substance Use Topics    Alcohol use: Never    Drug use: Never       Physical Exam   ED Triage Vitals  [07/11/24 1905]   Temperature Heart Rate Respirations BP   37.1 °C (98.8 °F) 62 18 131/67      Pulse Ox Temp src Heart Rate Source Patient Position   95 % -- -- --      BP Location FiO2 (%)     -- --       Physical Exam    ED Course & MDM   Diagnoses as of 07/11/24 2030   Nausea       Medical Decision Making  Labs Reviewed  CBC WITH AUTO DIFFERENTIAL - Abnormal     WBC                           7.8                    nRBC                          0.0                    RBC                           4.05                   Hemoglobin                    12.4                   Hematocrit                    38.5                   MCV                           95                     MCH                           30.6                   MCHC                          32.2                   RDW                           13.1                   Platelets                     239                    Neutrophils %                 87.4                   Immature Granulocytes %, Automated   0.4                    Lymphocytes %                 3.6                    Monocytes %                   5.1                    Eosinophils %                 3.4                    Basophils %                   0.1                    Neutrophils Absolute          6.85                   Immature Granulocytes Absolute, Au*   0.03                   Lymphocytes Absolute          0.28 (*)               Monocytes Absolute            0.40                   Eosinophils Absolute          0.27                   Basophils Absolute            0.01                COMPREHENSIVE METABOLIC PANEL - Abnormal     Glucose                       84                     Sodium                        137                    Potassium                     4.3                    Chloride                      104                    Bicarbonate                   25                     Anion Gap                     12                     Urea Nitrogen                 17                      Creatinine                    0.92                   eGFR                          71                     Calcium                       8.9                    Albumin                       3.5                    Alkaline Phosphatase          112                    Total Protein                 6.0 (*)                AST                           21                     Bilirubin, Total              0.5                    ALT                           18                  URINALYSIS WITH REFLEX CULTURE AND MICROSCOPIC - Abnormal     Color, Urine                  Yellow                 Appearance, Urine             Turbid (*)               Specific Gravity, Urine       1.016                  pH, Urine                     6.5                    Protein, Urine                10 (TRACE)                Glucose, Urine                Normal                 Blood, Urine                  0.1 (1+) (*)               Ketones, Urine                NEGATIVE                Bilirubin, Urine              NEGATIVE                Urobilinogen, Urine           Normal                 Nitrite, Urine                NEGATIVE                Leukocyte Esterase, Urine     500 Mohan/µL (*)            MICROSCOPIC ONLY, URINE - Abnormal     WBC, Urine                    >50 (*)                WBC Clumps, Urine             MANY                   RBC, Urine                    >20 (*)                Squamous Epithelial Cells, Urine                          Bacteria, Urine               2+ (*)                 Mucus, Urine                  FEW                 LIPASE - Normal     Lipase                        35                         Narrative: Venipuncture immediately after or during the administration of Metamizole may lead to falsely low results. Testing should be performed immediately prior to Metamizole dosing.  URINE CULTURE  URINALYSIS WITH REFLEX CULTURE AND MICROSCOPIC         Narrative: The following orders were created for panel  order Urinalysis with Reflex Culture and Microscopic.                  Procedure                               Abnormality         Status                                     ---------                               -----------         ------                                     Urinalysis with Reflex C...[[id number]]  Abnormal            Final result                               Extra Urine Gray Tube[[id number]]                            In process                                                   Please view results for these tests on the individual orders.  EXTRA URINE GRAY TUBE    Medical Decision Making:    Patient appears well nontoxic.  Vital signs within normal limits.  Lab work unremarkable.  Patient treated with 1 L normal saline as well as Zofran.  Contaminated urinalysis secondary to colovesicular fistula.  Patient has an appointment with a colorectal surgeon next week.  Patient feeling much improved following Zofran.  Will be given ODT Zofran for home.  Advised to return for any fever or increasing pain.  Stable at time of discharge.    Differential Diagnoses Considered: Abdominal infection, UTI, fistula, electrolyte abnormality, volume depletion    Escalation of Care:  Appropriate for discharge and follow-up with colorectal surgery.    Prescription Drug Consideration: PT Zofran.  Will continue previously prescribed antibiotic therapy of Cipro and Flagyl.          Procedure  Procedures     Sj Gordon DO  07/11/24 2032

## 2024-07-11 NOTE — PROGRESS NOTES
"Subjective   Patient ID: Gallo Schmidt \"Ernie" is a 64 y.o. female who presents for Follow-up (Er visit from 7/10/24 lower abd pain ).  Fu after ER for abdominal pain  Lower abdominal pain, on abx now  Better today  When urinate see bown stuff coming        Review of Systems   Constitutional: Negative.  Negative for chills and fever.   HENT: Negative.  Negative for congestion.    Eyes: Negative.  Negative for discharge.   Respiratory: Negative.  Negative for cough, shortness of breath and wheezing.    Cardiovascular: Negative.  Negative for chest pain, palpitations and leg swelling.   Gastrointestinal: Negative.  Negative for abdominal distention, abdominal pain, constipation, diarrhea, nausea and vomiting.   Endocrine: Negative.    Genitourinary: Negative.  Negative for dysuria and urgency.   Musculoskeletal: Negative.  Negative for back pain, joint swelling and neck stiffness.   Skin: Negative.  Negative for rash.   Allergic/Immunologic: Negative.  Negative for immunocompromised state.   Neurological: Negative.  Negative for light-headedness, numbness and headaches.   Hematological: Negative.  Negative for adenopathy.   Psychiatric/Behavioral: Negative.  Negative for agitation, behavioral problems and confusion.    All other systems reviewed and are negative.      Objective   Physical Exam  Vitals reviewed.   Constitutional:       General: She is not in acute distress.     Appearance: Normal appearance.   HENT:      Head: Normocephalic and atraumatic.      Nose: Nose normal.   Eyes:      Conjunctiva/sclera: Conjunctivae normal.      Pupils: Pupils are equal, round, and reactive to light.   Neck:      Vascular: No carotid bruit.   Cardiovascular:      Rate and Rhythm: Normal rate and regular rhythm.      Pulses: Normal pulses.      Heart sounds:      No gallop.   Pulmonary:      Effort: Pulmonary effort is normal. No respiratory distress.      Breath sounds: Normal breath sounds. No wheezing.   Abdominal:      " "General: Bowel sounds are normal.      Palpations: Abdomen is soft.      Tenderness: There is abdominal tenderness (lower abdominal).   Musculoskeletal:         General: Normal range of motion.      Cervical back: Normal range of motion. No rigidity.   Lymphadenopathy:      Cervical: No cervical adenopathy.   Skin:     General: Skin is warm.      Findings: No rash.   Neurological:      General: No focal deficit present.      Mental Status: She is alert and oriented to person, place, and time.   Psychiatric:         Mood and Affect: Mood normal.         Behavior: Behavior normal.       /74 (BP Location: Right arm, Patient Position: Sitting)   Pulse 64   Ht 1.499 m (4' 11\")   Wt 71.2 kg (157 lb)   SpO2 96%   BMI 31.71 kg/m²    No results found for: \"CBCDIF\", \"CMPLAS\", \"PSA\", \"LASA\", \"HGBA1C\"  Assessment/Plan   Problem List Items Addressed This Visit       Vesicosigmoidal fistula - Primary    Relevant Orders    Referral to Colorectal Surgery     Other Visit Diagnoses       Diverticulosis                 ER visit reviewed    CT dw pt    To bring FMLA     Finish the abx    Work excuse for tomorrow      MDM    1) COMPLEXITY: 1 UNDIAGNOSED NEW PROBLEM WITH UNCERTAIN PROGNOSIS  2)DATA: TESTS INTERPRETED AND OR ORDERED, TOOK INDEPENDENT HISTORY OR RECORDS REVIEWED  3)RISK: MODERATE RISK DUE TO NATURE OF MEDICAL CONDITIONS/COMORBIDITY OR MEDICATIONS ORDERED OR SURGICAL OR PROCEDURE REFERRAL, .    Fu before  "

## 2024-07-11 NOTE — LETTER
July 11, 2024     Patient: Gallo Schmidt   YOB: 1960   Date of Visit: 7/11/2024       To Whom It May Concern:    Gallo Schmidt was seen in my clinic on 7/11/2024 at 2:45 pm. Please excuse Gallo for her absence from work on 7/12/24.    If you have any questions or concerns, please don't hesitate to call.         Sincerely,         Kendrick Lopez MD        CC: No Recipients

## 2024-07-12 LAB
BACTERIA UR CULT: NORMAL
HOLD SPECIMEN: NORMAL

## 2024-07-14 ENCOUNTER — APPOINTMENT (OUTPATIENT)
Dept: RADIOLOGY | Facility: HOSPITAL | Age: 64
End: 2024-07-14
Payer: COMMERCIAL

## 2024-07-14 ENCOUNTER — HOSPITAL ENCOUNTER (OUTPATIENT)
Dept: CARDIOLOGY | Facility: HOSPITAL | Age: 64
Discharge: HOME | End: 2024-07-14
Payer: COMMERCIAL

## 2024-07-14 ENCOUNTER — HOSPITAL ENCOUNTER (EMERGENCY)
Facility: HOSPITAL | Age: 64
Discharge: HOME | End: 2024-07-14
Attending: EMERGENCY MEDICINE
Payer: COMMERCIAL

## 2024-07-14 VITALS
OXYGEN SATURATION: 94 % | BODY MASS INDEX: 32.05 KG/M2 | HEIGHT: 59 IN | HEART RATE: 69 BPM | RESPIRATION RATE: 17 BRPM | WEIGHT: 159 LBS | TEMPERATURE: 98.1 F | SYSTOLIC BLOOD PRESSURE: 107 MMHG | DIASTOLIC BLOOD PRESSURE: 67 MMHG

## 2024-07-14 DIAGNOSIS — R10.84 GENERALIZED ABDOMINAL PAIN: Primary | ICD-10-CM

## 2024-07-14 DIAGNOSIS — K63.2 ABDOMINAL FISTULA: ICD-10-CM

## 2024-07-14 DIAGNOSIS — N32.1 COLO-VESICAL FISTULA: ICD-10-CM

## 2024-07-14 LAB
ALBUMIN SERPL BCP-MCNC: 3 G/DL (ref 3.4–5)
ALP SERPL-CCNC: 77 U/L (ref 33–136)
ALT SERPL W P-5'-P-CCNC: 22 U/L (ref 7–45)
ANION GAP SERPL CALC-SCNC: 12 MMOL/L (ref 10–20)
APPEARANCE UR: ABNORMAL
AST SERPL W P-5'-P-CCNC: 25 U/L (ref 9–39)
BACTERIA #/AREA URNS AUTO: ABNORMAL /HPF
BASOPHILS # BLD AUTO: 0 X10*3/UL (ref 0–0.1)
BASOPHILS NFR BLD AUTO: 0 %
BILIRUB DIRECT SERPL-MCNC: 0.1 MG/DL (ref 0–0.3)
BILIRUB SERPL-MCNC: 0.5 MG/DL (ref 0–1.2)
BILIRUB UR STRIP.AUTO-MCNC: ABNORMAL MG/DL
BUN SERPL-MCNC: 19 MG/DL (ref 6–23)
CALCIUM SERPL-MCNC: 8.3 MG/DL (ref 8.6–10.3)
CHLORIDE SERPL-SCNC: 97 MMOL/L (ref 98–107)
CO2 SERPL-SCNC: 27 MMOL/L (ref 21–32)
COLOR UR: YELLOW
CREAT SERPL-MCNC: 0.87 MG/DL (ref 0.5–1.05)
EGFRCR SERPLBLD CKD-EPI 2021: 75 ML/MIN/1.73M*2
EOSINOPHIL # BLD AUTO: 0.45 X10*3/UL (ref 0–0.7)
EOSINOPHIL NFR BLD AUTO: 6.1 %
ERYTHROCYTE [DISTWIDTH] IN BLOOD BY AUTOMATED COUNT: 13.2 % (ref 11.5–14.5)
GLUCOSE SERPL-MCNC: 97 MG/DL (ref 74–99)
GLUCOSE UR STRIP.AUTO-MCNC: NORMAL MG/DL
HCT VFR BLD AUTO: 37.5 % (ref 36–46)
HGB BLD-MCNC: 12 G/DL (ref 12–16)
IMM GRANULOCYTES # BLD AUTO: 0.03 X10*3/UL (ref 0–0.7)
IMM GRANULOCYTES NFR BLD AUTO: 0.4 % (ref 0–0.9)
KETONES UR STRIP.AUTO-MCNC: ABNORMAL MG/DL
LACTATE SERPL-SCNC: 1.2 MMOL/L (ref 0.4–2)
LEUKOCYTE ESTERASE UR QL STRIP.AUTO: ABNORMAL
LIPASE SERPL-CCNC: 16 U/L (ref 9–82)
LYMPHOCYTES # BLD AUTO: 0.24 X10*3/UL (ref 1.2–4.8)
LYMPHOCYTES NFR BLD AUTO: 3.2 %
MCH RBC QN AUTO: 30.4 PG (ref 26–34)
MCHC RBC AUTO-ENTMCNC: 32 G/DL (ref 32–36)
MCV RBC AUTO: 95 FL (ref 80–100)
MONOCYTES # BLD AUTO: 0.24 X10*3/UL (ref 0.1–1)
MONOCYTES NFR BLD AUTO: 3.2 %
MUCOUS THREADS #/AREA URNS AUTO: ABNORMAL /LPF
NEUTROPHILS # BLD AUTO: 6.45 X10*3/UL (ref 1.2–7.7)
NEUTROPHILS NFR BLD AUTO: 87.1 %
NITRITE UR QL STRIP.AUTO: NEGATIVE
NRBC BLD-RTO: 0 /100 WBCS (ref 0–0)
PH UR STRIP.AUTO: 6.5 [PH]
PLATELET # BLD AUTO: 227 X10*3/UL (ref 150–450)
POTASSIUM SERPL-SCNC: 3.9 MMOL/L (ref 3.5–5.3)
PROT SERPL-MCNC: 5.6 G/DL (ref 6.4–8.2)
PROT UR STRIP.AUTO-MCNC: ABNORMAL MG/DL
RBC # BLD AUTO: 3.95 X10*6/UL (ref 4–5.2)
RBC # UR STRIP.AUTO: ABNORMAL /UL
RBC #/AREA URNS AUTO: >20 /HPF
SODIUM SERPL-SCNC: 132 MMOL/L (ref 136–145)
SP GR UR STRIP.AUTO: >1.05
SQUAMOUS #/AREA URNS AUTO: ABNORMAL /HPF
UROBILINOGEN UR STRIP.AUTO-MCNC: NORMAL MG/DL
WBC # BLD AUTO: 7.4 X10*3/UL (ref 4.4–11.3)
WBC #/AREA URNS AUTO: >50 /HPF
WBC CLUMPS #/AREA URNS AUTO: ABNORMAL /HPF

## 2024-07-14 PROCEDURE — 80053 COMPREHEN METABOLIC PANEL: CPT | Performed by: EMERGENCY MEDICINE

## 2024-07-14 PROCEDURE — 83605 ASSAY OF LACTIC ACID: CPT | Performed by: EMERGENCY MEDICINE

## 2024-07-14 PROCEDURE — 2550000001 HC RX 255 CONTRASTS: Performed by: EMERGENCY MEDICINE

## 2024-07-14 PROCEDURE — 2500000004 HC RX 250 GENERAL PHARMACY W/ HCPCS (ALT 636 FOR OP/ED): Performed by: EMERGENCY MEDICINE

## 2024-07-14 PROCEDURE — 94664 DEMO&/EVAL PT USE INHALER: CPT

## 2024-07-14 PROCEDURE — 81001 URINALYSIS AUTO W/SCOPE: CPT | Performed by: EMERGENCY MEDICINE

## 2024-07-14 PROCEDURE — 9420000001 HC RT PATIENT EDUCATION 5 MIN

## 2024-07-14 PROCEDURE — 94762 N-INVAS EAR/PLS OXIMTRY CONT: CPT

## 2024-07-14 PROCEDURE — 87086 URINE CULTURE/COLONY COUNT: CPT | Mod: SAMLAB | Performed by: EMERGENCY MEDICINE

## 2024-07-14 PROCEDURE — 36415 COLL VENOUS BLD VENIPUNCTURE: CPT | Performed by: EMERGENCY MEDICINE

## 2024-07-14 PROCEDURE — 2500000005 HC RX 250 GENERAL PHARMACY W/O HCPCS: Performed by: EMERGENCY MEDICINE

## 2024-07-14 PROCEDURE — 93005 ELECTROCARDIOGRAM TRACING: CPT

## 2024-07-14 PROCEDURE — 82248 BILIRUBIN DIRECT: CPT | Performed by: EMERGENCY MEDICINE

## 2024-07-14 PROCEDURE — 74177 CT ABD & PELVIS W/CONTRAST: CPT

## 2024-07-14 PROCEDURE — 96374 THER/PROPH/DIAG INJ IV PUSH: CPT | Mod: 59

## 2024-07-14 PROCEDURE — 85025 COMPLETE CBC W/AUTO DIFF WBC: CPT | Performed by: EMERGENCY MEDICINE

## 2024-07-14 PROCEDURE — 96361 HYDRATE IV INFUSION ADD-ON: CPT

## 2024-07-14 PROCEDURE — 83690 ASSAY OF LIPASE: CPT | Performed by: EMERGENCY MEDICINE

## 2024-07-14 PROCEDURE — 96375 TX/PRO/DX INJ NEW DRUG ADDON: CPT

## 2024-07-14 PROCEDURE — 71045 X-RAY EXAM CHEST 1 VIEW: CPT

## 2024-07-14 PROCEDURE — 99285 EMERGENCY DEPT VISIT HI MDM: CPT | Mod: 25

## 2024-07-14 PROCEDURE — 71045 X-RAY EXAM CHEST 1 VIEW: CPT | Performed by: RADIOLOGY

## 2024-07-14 RX ORDER — MORPHINE SULFATE 4 MG/ML
4 INJECTION, SOLUTION INTRAMUSCULAR; INTRAVENOUS ONCE
Status: COMPLETED | OUTPATIENT
Start: 2024-07-14 | End: 2024-07-14

## 2024-07-14 RX ORDER — ONDANSETRON HYDROCHLORIDE 2 MG/ML
8 INJECTION, SOLUTION INTRAVENOUS ONCE
Status: COMPLETED | OUTPATIENT
Start: 2024-07-14 | End: 2024-07-14

## 2024-07-14 RX ORDER — SODIUM CHLORIDE 9 MG/ML
125 INJECTION, SOLUTION INTRAVENOUS CONTINUOUS
Status: DISCONTINUED | OUTPATIENT
Start: 2024-07-14 | End: 2024-07-14 | Stop reason: HOSPADM

## 2024-07-14 RX ORDER — OXYCODONE AND ACETAMINOPHEN 5; 325 MG/1; MG/1
1 TABLET ORAL 4 TIMES DAILY
Qty: 12 TABLET | Refills: 0 | Status: SHIPPED | OUTPATIENT
Start: 2024-07-14 | End: 2024-07-17

## 2024-07-14 ASSESSMENT — PAIN SCALES - GENERAL
PAINLEVEL_OUTOF10: 6
PAINLEVEL_OUTOF10: 1
PAINLEVEL_OUTOF10: 5 - MODERATE PAIN
PAINLEVEL_OUTOF10: 8

## 2024-07-14 ASSESSMENT — ENCOUNTER SYMPTOMS
NAUSEA: 1
PSYCHIATRIC NEGATIVE: 1
ARTHRALGIAS: 0
WEAKNESS: 0
ABDOMINAL PAIN: 1
DYSURIA: 0
HEMATOLOGIC/LYMPHATIC NEGATIVE: 1
HEADACHES: 0
CHILLS: 0
NECK STIFFNESS: 0
EYES NEGATIVE: 1
NECK PAIN: 0
PALPITATIONS: 0
COUGH: 0
VOMITING: 1
MYALGIAS: 0
SHORTNESS OF BREATH: 0
FEVER: 0

## 2024-07-14 ASSESSMENT — PAIN - FUNCTIONAL ASSESSMENT
PAIN_FUNCTIONAL_ASSESSMENT: 0-10
PAIN_FUNCTIONAL_ASSESSMENT: 0-10

## 2024-07-14 ASSESSMENT — PAIN DESCRIPTION - FREQUENCY: FREQUENCY: CONSTANT/CONTINUOUS

## 2024-07-14 ASSESSMENT — PAIN DESCRIPTION - PROGRESSION: CLINICAL_PROGRESSION: GRADUALLY IMPROVING

## 2024-07-14 ASSESSMENT — PAIN DESCRIPTION - LOCATION
LOCATION_2: HEAD
LOCATION: ABDOMEN

## 2024-07-14 ASSESSMENT — COLUMBIA-SUICIDE SEVERITY RATING SCALE - C-SSRS
6. HAVE YOU EVER DONE ANYTHING, STARTED TO DO ANYTHING, OR PREPARED TO DO ANYTHING TO END YOUR LIFE?: NO
1. IN THE PAST MONTH, HAVE YOU WISHED YOU WERE DEAD OR WISHED YOU COULD GO TO SLEEP AND NOT WAKE UP?: NO
2. HAVE YOU ACTUALLY HAD ANY THOUGHTS OF KILLING YOURSELF?: NO

## 2024-07-14 ASSESSMENT — PAIN DESCRIPTION - ONSET: ONSET: ONGOING

## 2024-07-14 ASSESSMENT — PAIN DESCRIPTION - PAIN TYPE
TYPE: CHRONIC PAIN
TYPE: CHRONIC PAIN;ACUTE PAIN

## 2024-07-14 ASSESSMENT — PAIN DESCRIPTION - DESCRIPTORS: DESCRIPTORS_2: PATIENT UNABLE TO DESCRIBE

## 2024-07-14 NOTE — ED PROVIDER NOTES
Chief Complaint: ABD PAIN/VOMITING    This is a 64-year-old female complains of nausea vomiting abdominal pain.  She was diagnosed with fistula that connects her bladder to her colon but she apparently had this for some period of time supposed to see a specialist in [place] this coming week.  Complains of significant nausea with vomiting and some abdominal cramping she denies any dysuria or frequency no fever or chills otherwise           Review of Systems   Constitutional:  Negative for chills and fever.   HENT: Negative.     Eyes: Negative.    Respiratory:  Negative for cough and shortness of breath.    Cardiovascular:  Negative for chest pain, palpitations and leg swelling.   Gastrointestinal:  Positive for abdominal pain, nausea and vomiting.   Genitourinary:  Negative for dysuria.   Musculoskeletal:  Negative for arthralgias, myalgias, neck pain and neck stiffness.   Neurological:  Negative for weakness and headaches.   Hematological: Negative.    Psychiatric/Behavioral: Negative.     All other systems reviewed and are negative.       Physical Exam  Vitals reviewed.   Constitutional:       General: She is not in acute distress.     Appearance: She is not ill-appearing or toxic-appearing.      Comments: Patient's vital signs are stable she is not tachycardic nor fevers at this time   HENT:      Head: Normocephalic and atraumatic.   Eyes:      Extraocular Movements: Extraocular movements intact.      Pupils: Pupils are equal, round, and reactive to light.   Cardiovascular:      Rate and Rhythm: Normal rate and regular rhythm.      Heart sounds: Normal heart sounds. No murmur heard.  Pulmonary:      Effort: Pulmonary effort is normal.      Breath sounds: Normal breath sounds. No wheezing.   Abdominal:      General: Abdomen is protuberant. Bowel sounds are normal. There is no distension or abdominal bruit.      Palpations: There is no shifting dullness or fluid wave.      Tenderness: There is generalized  abdominal tenderness. There is no right CVA tenderness or left CVA tenderness.      Hernia: No hernia is present.      Comments: With some very minimal tenderness diffusely but no rebound or guarding   Skin:     General: Skin is warm and dry.      Capillary Refill: Capillary refill takes less than 2 seconds.   Neurological:      General: No focal deficit present.      Mental Status: She is alert.   Psychiatric:         Mood and Affect: Mood normal.          Labs Reviewed   CBC WITH AUTO DIFFERENTIAL - Abnormal       Result Value    WBC 7.4      nRBC 0.0      RBC 3.95 (*)     Hemoglobin 12.0      Hematocrit 37.5      MCV 95      MCH 30.4      MCHC 32.0      RDW 13.2      Platelets 227      Neutrophils % 87.1      Immature Granulocytes %, Automated 0.4      Lymphocytes % 3.2      Monocytes % 3.2      Eosinophils % 6.1      Basophils % 0.0      Neutrophils Absolute 6.45      Immature Granulocytes Absolute, Automated 0.03      Lymphocytes Absolute 0.24 (*)     Monocytes Absolute 0.24      Eosinophils Absolute 0.45      Basophils Absolute 0.00     BASIC METABOLIC PANEL - Abnormal    Glucose 97      Sodium 132 (*)     Potassium 3.9      Chloride 97 (*)     Bicarbonate 27      Anion Gap 12      Urea Nitrogen 19      Creatinine 0.87      eGFR 75      Calcium 8.3 (*)    HEPATIC FUNCTION PANEL - Abnormal    Albumin 3.0 (*)     Bilirubin, Total 0.5      Bilirubin, Direct 0.1      Alkaline Phosphatase 77      ALT 22      AST 25      Total Protein 5.6 (*)    URINALYSIS WITH REFLEX CULTURE AND MICROSCOPIC - Abnormal    Color, Urine Yellow      Appearance, Urine Turbid (*)     Specific Gravity, Urine >1.050 (*)     pH, Urine 6.5      Protein, Urine 10 (TRACE)      Glucose, Urine Normal      Blood, Urine 1.0 (3+) (*)     Ketones, Urine 20 (1+) (*)     Bilirubin, Urine 1 (1+) (*)     Urobilinogen, Urine Normal      Nitrite, Urine NEGATIVE      Leukocyte Esterase, Urine 500 Mohan/µL (*)    MICROSCOPIC ONLY, URINE - Abnormal    WBC,  Urine >50 (*)     WBC Clumps, Urine FEW      RBC, Urine >20 (*)     Squamous Epithelial Cells, Urine 1-9 (SPARSE)      Bacteria, Urine 2+ (*)     Mucus, Urine 1+     LIPASE - Normal    Lipase 16      Narrative:     Venipuncture immediately after or during the administration of Metamizole may lead to falsely low results. Testing should be performed immediately prior to Metamizole dosing.   LACTATE - Normal    Lactate 1.2      Narrative:     Venipuncture immediately after or during the administration of Metamizole may lead to falsely low results. Testing should be performed immediately  prior to Metamizole dosing.   URINE CULTURE   URINALYSIS WITH REFLEX CULTURE AND MICROSCOPIC    Narrative:     The following orders were created for panel order Urinalysis with Reflex Culture and Microscopic.  Procedure                               Abnormality         Status                     ---------                               -----------         ------                     Urinalysis with Reflex C...[[id number]]  Abnormal            Final result               Extra Urine Gray Tube[[id number]]                            In process                   Please view results for these tests on the individual orders.   EXTRA URINE GRAY TUBE        CT abdomen pelvis w IV contrast   Final Result   Diffuse sigmoid diverticulosis and mild distal left colon   diverticulosis. No specific CT evidence of acute diverticulitis.        However, there does appear to be a persistent connection between the   medial right wall of the proximal sigmoid and the left anterolateral   superior wall of the urinary bladder, and there is moderate air in   the urinary bladder today as well. Suspect colovesical fistula at   this location.        Evidence of prior granulomatous disease.        Moderate aortoiliac calcifications.        Arthritic changes in the spine as described.        Incidental note of Phrygian cap in the gallbladder fundus, a variant   of  normal.        MACRO:   None        Signed by: Malcom Noel 7/14/2024 4:55 PM   Dictation workstation:   [id number]      XR chest 1 view   Final Result   1.  No active cardiopulmonary disease.  There has not been   significant interval change from the prior exam.        Signed by: Adrian Segura 7/14/2024 3:23 PM   Dictation workstation:   [id number]           Procedures     Medical Decision Making  Differential diagnosis acute gastroenteritis bowel obstruction fistula UTI.  Normal saline bolus of 1 L was ordered the normal saline infusion Zofran 8 mg intravenously was 4 mg of morphine sulfate for pain routine labs urinalysis and CT scan of the abdomen pelvis at this time.  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  Patient felt improved after above treatment will be continued on current antibiotics and Zofran was given Percocet for pain and is to follow-up with the colorectal surgery as scheduled this week    Amount and/or Complexity of Data Reviewed  ECG/medicine tests: independent interpretation performed.     Details: Lead EKG showed sinus rhythm left axis deviation some poor R wave progression but no acute ST segment elevation or depressions         Diagnoses as of 07/14/24 1725   Generalized abdominal pain   Abdominal fistula   [place]-vesical fistula                    Juan Gerard MD  07/14/24 1725

## 2024-07-15 LAB
ATRIAL RATE: 78 BPM
HOLD SPECIMEN: NORMAL
P AXIS: 56 DEGREES
P OFFSET: 185 MS
P ONSET: 137 MS
PR INTERVAL: 158 MS
Q ONSET: 216 MS
QRS COUNT: 12 BEATS
QRS DURATION: 90 MS
QT INTERVAL: 390 MS
QTC CALCULATION(BAZETT): 444 MS
QTC FREDERICIA: 425 MS
R AXIS: -34 DEGREES
T AXIS: 51 DEGREES
T OFFSET: 411 MS
VENTRICULAR RATE: 78 BPM

## 2024-07-16 LAB — BACTERIA UR CULT: NORMAL

## 2024-07-18 ENCOUNTER — APPOINTMENT (OUTPATIENT)
Dept: SURGERY | Facility: CLINIC | Age: 64
End: 2024-07-18
Payer: COMMERCIAL

## 2024-07-23 ENCOUNTER — APPOINTMENT (OUTPATIENT)
Dept: SURGERY | Facility: CLINIC | Age: 64
End: 2024-07-23
Payer: COMMERCIAL

## 2024-07-23 VITALS
WEIGHT: 152 LBS | BODY MASS INDEX: 30.64 KG/M2 | HEIGHT: 59 IN | DIASTOLIC BLOOD PRESSURE: 73 MMHG | SYSTOLIC BLOOD PRESSURE: 107 MMHG | HEART RATE: 79 BPM

## 2024-07-23 DIAGNOSIS — Z87.19 HISTORY OF COLONIC DIVERTICULITIS: Primary | ICD-10-CM

## 2024-07-23 DIAGNOSIS — N32.1: ICD-10-CM

## 2024-07-23 PROCEDURE — 3008F BODY MASS INDEX DOCD: CPT | Performed by: STUDENT IN AN ORGANIZED HEALTH CARE EDUCATION/TRAINING PROGRAM

## 2024-07-23 PROCEDURE — 3074F SYST BP LT 130 MM HG: CPT | Performed by: STUDENT IN AN ORGANIZED HEALTH CARE EDUCATION/TRAINING PROGRAM

## 2024-07-23 PROCEDURE — 99203 OFFICE O/P NEW LOW 30 MIN: CPT | Performed by: STUDENT IN AN ORGANIZED HEALTH CARE EDUCATION/TRAINING PROGRAM

## 2024-07-23 PROCEDURE — 3078F DIAST BP <80 MM HG: CPT | Performed by: STUDENT IN AN ORGANIZED HEALTH CARE EDUCATION/TRAINING PROGRAM

## 2024-07-23 PROCEDURE — 1036F TOBACCO NON-USER: CPT | Performed by: STUDENT IN AN ORGANIZED HEALTH CARE EDUCATION/TRAINING PROGRAM

## 2024-07-23 ASSESSMENT — ENCOUNTER SYMPTOMS
DIFFICULTY URINATING: 0
WEAKNESS: 0
CHOKING: 0
CHEST TIGHTNESS: 0
DYSURIA: 1
LIGHT-HEADEDNESS: 0
ACTIVITY CHANGE: 0
NAUSEA: 0
DIZZINESS: 0
BLOOD IN STOOL: 0
DIARRHEA: 0
WHEEZING: 1
ABDOMINAL PAIN: 1
COUGH: 0
UNEXPECTED WEIGHT CHANGE: 1
CHILLS: 0
FREQUENCY: 1
PALPITATIONS: 0
FATIGUE: 0
VOMITING: 0
HEMATURIA: 0
APPETITE CHANGE: 0
CONSTIPATION: 1
ANAL BLEEDING: 0
SHORTNESS OF BREATH: 0
RECTAL PAIN: 0
FEVER: 0
ABDOMINAL DISTENTION: 0
AGITATION: 0

## 2024-07-23 NOTE — PROGRESS NOTES
"ALEM Schmidt \"Ernie" is a 64 y.o. female who presented to [facility] ED on 7/10/24 for abdominal pain. 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 She was recommended to receive IV antibiotics and be seen by a specialist, she declined. She wanted to take oral antibiotics and follow up with primary urologists. She was rx cipro and flagyl and discharged.  She was referred by Dr. Lopez for a colovesical fistula. She presents today to discuss.     Has been experiencing pneumaturia and fecaluria on near daily basis starting 2 years ago.  History of diverticulitis; estimates 4 episodes total with most recent immediately preceding development of urinary symptoms.  Most recent UTI was beginning of this month for which she was given 10 day course of ciprofloxacin and flagyl which she has recently completed.  She reports after eating and drinking she has bilateral lower quadrant abdominal pain. She has a low appetite, due to feeling full. She is scared to eat for fear of exacerbating her colovesical fistula.  She passes BM daily.  She denies hematochezia, melena, diarrhea, constipation.  She denies air or stool per vagina. She states she has lost 8lbs in a week due to not eating. She has had three family members with this condition.     Colonoscopy 8/3/2022 - One 4mm polyp in sigmoid colon, two 2-3mm polyps in rectum, sigmoid colon diverticulosis, non-bleeding internal hemorrhoids; pathology suggestive of rectal prolapse changes.    Former smoker (13 years ago)/No ETOH/No Illicit drug use  PMH: Pulmonary HTN, VIJAY-CPAP, HTN,   PSH: Laparoscopic Right Ovary removal,  section   No family history of CRC   Sister hx of Crohn's   Employment: Works in machine shop    Past Medical History:   Diagnosis Date    Acute exacerbation of chronic bronchitis (Multi) 2023    COPD (chronic obstructive pulmonary disease) (Multi)     Cough 2023    Diverticulitis     GERD (gastroesophageal reflux " disease)     Hypertension     Lung nodule     Pneumonia 1971    Procedure and treatment not carried out because of patient's decision for unspecified reasons 2021    Mammogram declined    Sleep apnea, obstructive 2020    Wheezing on auscultation 2023       Past Surgical History:   Procedure Laterality Date     SECTION, CLASSIC      COLONOSCOPY  2022    REPEAT 5 YRS FOR POLYPS    CT GUIDED ABSCESS FLUID COLLECTION DRAINAGE  2022    CT GUIDED ABSCESS FLUID COLLECTION DRAINAGE 2022 [facility]PAT[facility] LEGACY    OVARIAN CYST REMOVAL         Allergies   Allergen Reactions    Lisinopril Cough and Other     Cough    Sulfa (Sulfonamide Antibiotics) Rash       Review of Systems   Constitutional:  Positive for unexpected weight change. Negative for activity change, appetite change, chills, fatigue and fever.   Respiratory:  Positive for wheezing. Negative for cough, choking, chest tightness and shortness of breath.    Cardiovascular:  Negative for chest pain, palpitations and leg swelling.   Gastrointestinal:  Positive for abdominal pain and constipation. Negative for abdominal distention, anal bleeding, blood in stool, diarrhea, nausea, rectal pain and vomiting.   Genitourinary:  Positive for dysuria and frequency. Negative for difficulty urinating and hematuria.        Pneumaturia and fecaluria.   Neurological:  Negative for dizziness, weakness and light-headedness.   Psychiatric/Behavioral:  Negative for agitation.      Physical Exam  Constitutional:       General: She is not in acute distress.     Appearance: Normal appearance. She is not ill-appearing.   HENT:      Head: Normocephalic.      Mouth/Throat:      Mouth: Mucous membranes are moist.   Eyes:      Extraocular Movements: Extraocular movements intact.   Cardiovascular:      Rate and Rhythm: Normal rate and regular rhythm.      Pulses: Normal pulses.      Heart sounds: Normal heart sounds. No murmur heard.  Pulmonary:       Effort: No respiratory distress.      Breath sounds: Wheezing present. No rhonchi or rales.   Chest:      Chest wall: No tenderness.   Abdominal:      General: There is no distension.      Palpations: Abdomen is soft. There is no mass.      Tenderness: There is abdominal tenderness. There is no guarding or rebound.      Hernia: No hernia is present.      Comments: Well healed midline incision.  Mild generalized TTP.     Genitourinary:     Rectum: Normal.   Musculoskeletal:         General: No swelling or deformity.      Cervical back: No rigidity.      Right lower leg: No edema.      Left lower leg: No edema.   Skin:     General: Skin is warm.      Coloration: Skin is not jaundiced or pale.   Neurological:      Mental Status: She is alert.       Assessment and Plan:   #Colovesical fistula  #History of sigmoid diverticulitis  -  Repeat colonoscopy; she will contact Dr. Lopez to schedule  -  Plan for laparoscopic possible open sigmoidectomy, takedown colovesical fistula, CRA, flexible sigmoidoscopy  -  R/B/A discussed with patient including risks of bleeding, infection, injury to structures, anastomotic complications  -  Follow-up in office after completion of colonoscopy  -  Will need TOSHA Martinez MD   7/23/2024  12:40 PM

## 2024-07-30 ENCOUNTER — TELEPHONE (OUTPATIENT)
Dept: PRIMARY CARE | Facility: CLINIC | Age: 64
End: 2024-07-30

## 2024-07-30 ENCOUNTER — APPOINTMENT (OUTPATIENT)
Dept: PRIMARY CARE | Facility: CLINIC | Age: 64
End: 2024-07-30
Payer: COMMERCIAL

## 2024-07-30 VITALS
WEIGHT: 156 LBS | SYSTOLIC BLOOD PRESSURE: 117 MMHG | BODY MASS INDEX: 31.45 KG/M2 | HEIGHT: 59 IN | DIASTOLIC BLOOD PRESSURE: 79 MMHG | HEART RATE: 62 BPM

## 2024-07-30 DIAGNOSIS — K57.90 DIVERTICULOSIS: ICD-10-CM

## 2024-07-30 DIAGNOSIS — N32.1: Primary | ICD-10-CM

## 2024-07-30 PROBLEM — L25.9 CONTACT DERMATITIS: Status: RESOLVED | Noted: 2023-05-04 | Resolved: 2024-07-30

## 2024-07-30 PROCEDURE — 3074F SYST BP LT 130 MM HG: CPT | Performed by: INTERNAL MEDICINE

## 2024-07-30 PROCEDURE — 3008F BODY MASS INDEX DOCD: CPT | Performed by: INTERNAL MEDICINE

## 2024-07-30 PROCEDURE — 3078F DIAST BP <80 MM HG: CPT | Performed by: INTERNAL MEDICINE

## 2024-07-30 PROCEDURE — 1036F TOBACCO NON-USER: CPT | Performed by: INTERNAL MEDICINE

## 2024-07-30 PROCEDURE — 99214 OFFICE O/P EST MOD 30 MIN: CPT | Performed by: INTERNAL MEDICINE

## 2024-07-30 ASSESSMENT — ENCOUNTER SYMPTOMS
RESPIRATORY NEGATIVE: 1
CONFUSION: 0
DIARRHEA: 0
ENDOCRINE NEGATIVE: 1
ADENOPATHY: 0
DYSURIA: 0
VOMITING: 0
ALLERGIC/IMMUNOLOGIC NEGATIVE: 1
LIGHT-HEADEDNESS: 0
AGITATION: 0
EYE DISCHARGE: 0
MUSCULOSKELETAL NEGATIVE: 1
JOINT SWELLING: 0
ABDOMINAL PAIN: 0
CARDIOVASCULAR NEGATIVE: 1
PSYCHIATRIC NEGATIVE: 1
HEADACHES: 0
NEUROLOGICAL NEGATIVE: 1
CHILLS: 0
CONSTITUTIONAL NEGATIVE: 1
HEMATOLOGIC/LYMPHATIC NEGATIVE: 1
EYES NEGATIVE: 1
COUGH: 0
CONSTIPATION: 0
ABDOMINAL DISTENTION: 0
NUMBNESS: 0
WHEEZING: 0
GASTROINTESTINAL NEGATIVE: 1
NECK STIFFNESS: 0
BACK PAIN: 0
FEVER: 0
PALPITATIONS: 0
SHORTNESS OF BREATH: 0
NAUSEA: 0

## 2024-07-30 NOTE — PROGRESS NOTES
"Subjective   Patient ID: Gallo Schmidt \"Ernie" is a 64 y.o. female who presents for Follow-up (Pt here for paperwork for insurance and needs colonoscopy scheduled before upcoming surgery).  Here for disability form for insurance for vesiculosigmoid fistula    Feels fine today      Was told needs colonoscopy before surgery    No date for surgery yet        Review of Systems   Constitutional: Negative.  Negative for chills and fever.   HENT: Negative.  Negative for congestion.    Eyes: Negative.  Negative for discharge.   Respiratory: Negative.  Negative for cough, shortness of breath and wheezing.    Cardiovascular: Negative.  Negative for chest pain, palpitations and leg swelling.   Gastrointestinal: Negative.  Negative for abdominal distention, abdominal pain, constipation, diarrhea, nausea and vomiting.   Endocrine: Negative.    Genitourinary: Negative.  Negative for dysuria and urgency.   Musculoskeletal: Negative.  Negative for back pain, joint swelling and neck stiffness.   Skin: Negative.  Negative for rash.   Allergic/Immunologic: Negative.  Negative for immunocompromised state.   Neurological: Negative.  Negative for light-headedness, numbness and headaches.   Hematological: Negative.  Negative for adenopathy.   Psychiatric/Behavioral: Negative.  Negative for agitation, behavioral problems and confusion.    All other systems reviewed and are negative.      Objective   Physical Exam  Vitals reviewed.   Constitutional:       General: She is not in acute distress.     Appearance: Normal appearance.   HENT:      Head: Normocephalic and atraumatic.      Nose: Nose normal.   Eyes:      Conjunctiva/sclera: Conjunctivae normal.      Pupils: Pupils are equal, round, and reactive to light.   Neck:      Vascular: No carotid bruit.   Cardiovascular:      Rate and Rhythm: Normal rate and regular rhythm.      Pulses: Normal pulses.      Heart sounds:      No gallop.   Pulmonary:      Effort: Pulmonary effort is " "normal. No respiratory distress.      Breath sounds: Normal breath sounds. No wheezing.   Abdominal:      General: Bowel sounds are normal.      Palpations: Abdomen is soft.      Tenderness: There is no abdominal tenderness.   Musculoskeletal:         General: Normal range of motion.      Cervical back: Normal range of motion. No rigidity.   Lymphadenopathy:      Cervical: No cervical adenopathy.   Skin:     General: Skin is warm.      Findings: No rash.   Neurological:      General: No focal deficit present.      Mental Status: She is alert and oriented to person, place, and time.   Psychiatric:         Mood and Affect: Mood normal.         Behavior: Behavior normal.       /79 (BP Location: Left arm, Patient Position: Sitting)   Pulse 62   Ht 1.499 m (4' 11\")   Wt 70.8 kg (156 lb)   BMI 31.51 kg/m²    No results found for: \"CBCDIF\", \"CMPLAS\", \"PSA\", \"LASA\", \"HGBA1C\"  Assessment/Plan   Problem List Items Addressed This Visit       Vesicosigmoidal fistula - Primary    Relevant Orders    Colonoscopy Diagnostic     Other Visit Diagnoses       Diverticulosis        Relevant Orders    Colonoscopy Diagnostic             More protein    Labs reviewed with pt    CT abdomen dw pt      Disability form done    Will do colonoscopy ASAP    Fu after colonoscopy     " No

## 2024-07-31 ENCOUNTER — PREP FOR PROCEDURE (OUTPATIENT)
Dept: PRIMARY CARE | Facility: CLINIC | Age: 64
End: 2024-07-31
Payer: COMMERCIAL

## 2024-07-31 RX ORDER — SODIUM CHLORIDE, SODIUM LACTATE, POTASSIUM CHLORIDE, CALCIUM CHLORIDE 600; 310; 30; 20 MG/100ML; MG/100ML; MG/100ML; MG/100ML
20 INJECTION, SOLUTION INTRAVENOUS CONTINUOUS
Status: CANCELLED | OUTPATIENT
Start: 2024-07-31

## 2024-08-01 ENCOUNTER — HOSPITAL ENCOUNTER (OUTPATIENT)
Dept: GASTROENTEROLOGY | Facility: CLINIC | Age: 64
Setting detail: OUTPATIENT SURGERY
Discharge: HOME | End: 2024-08-01
Payer: COMMERCIAL

## 2024-08-01 VITALS
HEART RATE: 76 BPM | BODY MASS INDEX: 30.8 KG/M2 | HEIGHT: 59 IN | TEMPERATURE: 97.6 F | DIASTOLIC BLOOD PRESSURE: 84 MMHG | RESPIRATION RATE: 16 BRPM | WEIGHT: 152.8 LBS | SYSTOLIC BLOOD PRESSURE: 137 MMHG | OXYGEN SATURATION: 94 %

## 2024-08-01 DIAGNOSIS — N32.1: ICD-10-CM

## 2024-08-01 DIAGNOSIS — K57.90 DIVERTICULOSIS: ICD-10-CM

## 2024-08-01 PROCEDURE — 7100000009 HC PHASE TWO TIME - INITIAL BASE CHARGE

## 2024-08-01 PROCEDURE — 99152 MOD SED SAME PHYS/QHP 5/>YRS: CPT | Performed by: INTERNAL MEDICINE

## 2024-08-01 PROCEDURE — 2500000004 HC RX 250 GENERAL PHARMACY W/ HCPCS (ALT 636 FOR OP/ED): Performed by: INTERNAL MEDICINE

## 2024-08-01 PROCEDURE — 3700000012 HC SEDATION LEVEL 5+ TIME - INITIAL 15 MINUTES 5/> YEARS

## 2024-08-01 PROCEDURE — 7100000010 HC PHASE TWO TIME - EACH INCREMENTAL 1 MINUTE

## 2024-08-01 PROCEDURE — 45378 DIAGNOSTIC COLONOSCOPY: CPT | Performed by: INTERNAL MEDICINE

## 2024-08-01 RX ORDER — MIDAZOLAM HYDROCHLORIDE 5 MG/ML
INJECTION, SOLUTION INTRAMUSCULAR; INTRAVENOUS AS NEEDED
Status: COMPLETED | OUTPATIENT
Start: 2024-08-01 | End: 2024-08-01

## 2024-08-01 RX ORDER — SODIUM CHLORIDE, SODIUM LACTATE, POTASSIUM CHLORIDE, CALCIUM CHLORIDE 600; 310; 30; 20 MG/100ML; MG/100ML; MG/100ML; MG/100ML
20 INJECTION, SOLUTION INTRAVENOUS CONTINUOUS
Status: DISCONTINUED | OUTPATIENT
Start: 2024-08-01 | End: 2024-08-02 | Stop reason: HOSPADM

## 2024-08-01 RX ORDER — MIDAZOLAM HYDROCHLORIDE 1 MG/ML
INJECTION, SOLUTION INTRAMUSCULAR; INTRAVENOUS AS NEEDED
Status: COMPLETED | OUTPATIENT
Start: 2024-08-01 | End: 2024-08-01

## 2024-08-01 RX ORDER — MEPERIDINE HYDROCHLORIDE 25 MG/ML
INJECTION INTRAMUSCULAR; INTRAVENOUS; SUBCUTANEOUS AS NEEDED
Status: COMPLETED | OUTPATIENT
Start: 2024-08-01 | End: 2024-08-01

## 2024-08-01 ASSESSMENT — PAIN SCALES - GENERAL
PAINLEVEL_OUTOF10: 2
PAINLEVEL_OUTOF10: 0 - NO PAIN

## 2024-08-01 ASSESSMENT — PAIN - FUNCTIONAL ASSESSMENT: PAIN_FUNCTIONAL_ASSESSMENT: 0-10

## 2024-08-01 NOTE — DISCHARGE INSTRUCTIONS
Patient Instructions after a Colonoscopy      The anesthetics, sedatives or narcotics which were given to you today will be acting in your body for the next 24 hours, so you might feel a little sleepy or groggy.  This feeling should slowly wear off. Carefully read and follow the instructions.     You received sedation today:  - Do not drive or operate any machinery or power tools of any kind.   - No alcoholic beverages today, not even beer or wine.  - Do not make any important decisions or sign any legal documents.  - No over the counter medications that contain alcohol or that may cause drowsiness.  - Do not make any important decisions or sign any legal documents.  - Make sure you have someone with you for first 24 hours.    While it is common to experience mild to moderate abdominal distention, gas, or belching after your procedure, if any of these symptoms occur following discharge from the GI Lab or within one week of having your procedure, call the [facility] to be advised whether a visit to your nearest Urgent Care or Emergency Department is indicated.  Take this paper with you if you go.     - If you develop an allergic reaction to the medications that were given during your procedure such as difficulty breathing, rash, hives, severe nausea, vomiting or lightheadedness.  - If you experience chest pain, shortness of breath, severe abdominal pain, fevers and chills.  -If you develop signs and symptoms of bleeding such as blood in your spit, if your stools turn black, tarry, or bloody  - If you have not urinated within 8 hours following your procedure.  - If your IV site becomes painful, red, inflamed, or looks infected.    If you received a biopsy/polypectomy/sphincterotomy the following instructions apply below:    __ Do not use Aspirin containing products, non-steroidal medications or anti-coagulants for one week following your procedure. (Examples of these types of medications are: Advil,  Arthrotec, Aleve, Coumadin, Ecotrin, Heparin, Ibuprofen, Indocin, Motrin, Naprosyn, Nuprin, Plavix, Vioxx, and Voltarin, or their generic forms.  This list is not all-inclusive.  Check with your physician or pharmacist before resuming medications.)   __ Eat a soft diet today.  Avoid foods that are poorly digested for the next 24 hours.  These foods would include: nuts, beans, lettuce, red meats, and fried foods. Start with liquids and advance your diet as tolerated, gradually work up to eating solids.   __ Do not have a Barium Study or Enema for one week.    Your physician recommends the additional following instructions:    -You have a contact number available for emergencies. The signs and symptoms of potential delayed complications were discussed with you. You may return to normal activities tomorrow.  -Resume your previous diet.  -Continue your present medications.   -We are waiting for your pathology results.  -Your physician has recommended a repeat colonoscopy (date to be determined after pending pathology results are reviewed) for surveillance based on pathology results.  -The findings and recommendations have been discussed with you.  -The findings and recommendations were discussed with your family.  - Please see Medication Reconciliation Form for new medication/medications prescribed.       If you experience any problems or have any questions following discharge from the GI Lab, please call:        Nurse Signature                                                                        Date___________________                                                                            Patient/Responsible Party Signature                                        Date___________________

## 2024-08-01 NOTE — H&P
Pre procedure H&P  Pt feels fine , no complaint  General appearance: Comfortable, no distress  ROS: No SOB  Medications reviewed  Head: Normal  Neck: Soft  Heart: Regular  Lungs: Clear  Abdomen: soft    Impression: clinically doing fine, proceed with procedure    Problem List Items Addressed This Visit       Vesicosigmoidal fistula    Relevant Orders    Colonoscopy Diagnostic     Other Visit Diagnoses       Diverticulosis        Relevant Orders    Colonoscopy Diagnostic

## 2024-08-14 ENCOUNTER — APPOINTMENT (OUTPATIENT)
Dept: PRIMARY CARE | Facility: CLINIC | Age: 64
End: 2024-08-14
Payer: COMMERCIAL

## 2024-08-15 ENCOUNTER — OFFICE VISIT (OUTPATIENT)
Dept: PRIMARY CARE | Facility: CLINIC | Age: 64
End: 2024-08-15
Payer: COMMERCIAL

## 2024-08-15 VITALS
BODY MASS INDEX: 31.04 KG/M2 | DIASTOLIC BLOOD PRESSURE: 90 MMHG | SYSTOLIC BLOOD PRESSURE: 153 MMHG | HEIGHT: 59 IN | WEIGHT: 154 LBS | HEART RATE: 97 BPM

## 2024-08-15 DIAGNOSIS — K57.90 DIVERTICULOSIS: Primary | ICD-10-CM

## 2024-08-15 DIAGNOSIS — I10 PRIMARY HYPERTENSION: ICD-10-CM

## 2024-08-15 DIAGNOSIS — N32.1 COLOVESICAL FISTULA: ICD-10-CM

## 2024-08-15 PROCEDURE — 99214 OFFICE O/P EST MOD 30 MIN: CPT | Performed by: INTERNAL MEDICINE

## 2024-08-15 PROCEDURE — 1036F TOBACCO NON-USER: CPT | Performed by: INTERNAL MEDICINE

## 2024-08-15 PROCEDURE — 3080F DIAST BP >= 90 MM HG: CPT | Performed by: INTERNAL MEDICINE

## 2024-08-15 PROCEDURE — 3008F BODY MASS INDEX DOCD: CPT | Performed by: INTERNAL MEDICINE

## 2024-08-15 PROCEDURE — 3077F SYST BP >= 140 MM HG: CPT | Performed by: INTERNAL MEDICINE

## 2024-08-15 RX ORDER — LOSARTAN POTASSIUM 50 MG/1
50 TABLET ORAL DAILY
Qty: 30 TABLET | Refills: 11 | Status: SHIPPED | OUTPATIENT
Start: 2024-08-15 | End: 2025-08-15

## 2024-08-15 ASSESSMENT — ENCOUNTER SYMPTOMS
JOINT SWELLING: 0
ABDOMINAL PAIN: 1
CONSTIPATION: 0
EYES NEGATIVE: 1
NECK STIFFNESS: 0
ALLERGIC/IMMUNOLOGIC NEGATIVE: 1
MUSCULOSKELETAL NEGATIVE: 1
NUMBNESS: 0
CONSTITUTIONAL NEGATIVE: 1
DYSURIA: 0
EYE DISCHARGE: 0
LIGHT-HEADEDNESS: 0
NAUSEA: 0
AGITATION: 0
ADENOPATHY: 0
BACK PAIN: 0
ENDOCRINE NEGATIVE: 1
CONFUSION: 0
WHEEZING: 0
FEVER: 0
PSYCHIATRIC NEGATIVE: 1
SHORTNESS OF BREATH: 0
RESPIRATORY NEGATIVE: 1
HEMATOLOGIC/LYMPHATIC NEGATIVE: 1
ABDOMINAL DISTENTION: 0
CARDIOVASCULAR NEGATIVE: 1
NEUROLOGICAL NEGATIVE: 1
DIARRHEA: 0
CHILLS: 0
PALPITATIONS: 0
VOMITING: 0
HEADACHES: 0
COUGH: 0

## 2024-08-15 NOTE — PROGRESS NOTES
"Subjective   Patient ID: Gallo Schmidt \"Ernie" is a 64 y.o. female who presents for Follow-up (FU COLONOSCOPY).  Abdominal Pain  This is a chronic problem. The problem has been gradually improving. The pain is located in the LLQ and RLQ. The pain is at a severity of 3/10. The quality of the pain is aching and a sensation of fullness. Pertinent negatives include no constipation, diarrhea, dysuria, fever, headaches, nausea or vomiting.       Review of Systems   Constitutional: Negative.  Negative for chills and fever.   HENT: Negative.  Negative for congestion.    Eyes: Negative.  Negative for discharge.   Respiratory: Negative.  Negative for cough, shortness of breath and wheezing.    Cardiovascular: Negative.  Negative for chest pain, palpitations and leg swelling.   Gastrointestinal:  Positive for abdominal pain. Negative for abdominal distention, constipation, diarrhea, nausea and vomiting.   Endocrine: Negative.    Genitourinary: Negative.  Negative for dysuria and urgency.   Musculoskeletal: Negative.  Negative for back pain, joint swelling and neck stiffness.   Skin: Negative.  Negative for rash.   Allergic/Immunologic: Negative.  Negative for immunocompromised state.   Neurological: Negative.  Negative for light-headedness, numbness and headaches.   Hematological: Negative.  Negative for adenopathy.   Psychiatric/Behavioral: Negative.  Negative for agitation, behavioral problems and confusion.    All other systems reviewed and are negative.      Objective   Physical Exam  Vitals reviewed.   Constitutional:       General: She is not in acute distress.     Appearance: Normal appearance.   HENT:      Head: Normocephalic and atraumatic.      Nose: Nose normal.   Eyes:      Conjunctiva/sclera: Conjunctivae normal.      Pupils: Pupils are equal, round, and reactive to light.   Neck:      Vascular: No carotid bruit.   Cardiovascular:      Rate and Rhythm: Normal rate and regular rhythm.      Pulses: Normal " "pulses.      Heart sounds:      No gallop.   Pulmonary:      Effort: Pulmonary effort is normal. No respiratory distress.      Breath sounds: Normal breath sounds. No wheezing.   Abdominal:      General: Bowel sounds are normal.      Palpations: Abdomen is soft.      Tenderness: There is abdominal tenderness (LLQ).   Musculoskeletal:         General: Normal range of motion.      Cervical back: Normal range of motion. No rigidity.   Lymphadenopathy:      Cervical: No cervical adenopathy.   Skin:     General: Skin is warm.      Findings: No rash.   Neurological:      General: No focal deficit present.      Mental Status: She is alert and oriented to person, place, and time.   Psychiatric:         Mood and Affect: Mood normal.         Behavior: Behavior normal.       /90 (BP Location: Right arm, Patient Position: Sitting)   Pulse 97   Ht 1.499 m (4' 11\")   Wt 69.9 kg (154 lb)   BMI 31.10 kg/m²    No results found for: \"CBCDIF\", \"CMPLAS\", \"PSA\", \"LASA\", \"HGBA1C\"  Assessment/Plan   Problem List Items Addressed This Visit       Hypertension    Relevant Medications    losartan (Cozaar) 50 mg tablet    Diverticulosis - Primary     Other Visit Diagnoses       Colovesical fistula               WILL SEE SURGEON 8/27 TO SET THE DATE FOR SURGERY      HTN addressed as follow:    MONITOR BP   GOAL BP LOWER THAN 130/80  LOW SALT  EXERCISE DAILY    COLONOSCOPY DW PT    FU BEFORE  "

## 2024-08-20 ENCOUNTER — OFFICE VISIT (OUTPATIENT)
Dept: SURGERY | Facility: CLINIC | Age: 64
End: 2024-08-20
Payer: COMMERCIAL

## 2024-08-20 VITALS
OXYGEN SATURATION: 93 % | SYSTOLIC BLOOD PRESSURE: 141 MMHG | HEART RATE: 72 BPM | WEIGHT: 155 LBS | DIASTOLIC BLOOD PRESSURE: 80 MMHG | BODY MASS INDEX: 31.25 KG/M2 | HEIGHT: 59 IN

## 2024-08-20 DIAGNOSIS — N32.1 COLOVESICAL FISTULA: ICD-10-CM

## 2024-08-20 PROCEDURE — 1036F TOBACCO NON-USER: CPT | Performed by: STUDENT IN AN ORGANIZED HEALTH CARE EDUCATION/TRAINING PROGRAM

## 2024-08-20 PROCEDURE — 3008F BODY MASS INDEX DOCD: CPT | Performed by: STUDENT IN AN ORGANIZED HEALTH CARE EDUCATION/TRAINING PROGRAM

## 2024-08-20 PROCEDURE — 3077F SYST BP >= 140 MM HG: CPT | Performed by: STUDENT IN AN ORGANIZED HEALTH CARE EDUCATION/TRAINING PROGRAM

## 2024-08-20 PROCEDURE — 99214 OFFICE O/P EST MOD 30 MIN: CPT | Performed by: STUDENT IN AN ORGANIZED HEALTH CARE EDUCATION/TRAINING PROGRAM

## 2024-08-20 PROCEDURE — 3079F DIAST BP 80-89 MM HG: CPT | Performed by: STUDENT IN AN ORGANIZED HEALTH CARE EDUCATION/TRAINING PROGRAM

## 2024-08-20 RX ORDER — GABAPENTIN 100 MG/1
CAPSULE ORAL
Qty: 3 CAPSULE | Refills: 0 | Status: SHIPPED | OUTPATIENT
Start: 2024-08-20

## 2024-08-20 RX ORDER — NEOMYCIN SULFATE 500 MG/1
TABLET ORAL
Qty: 6 TABLET | Refills: 0 | Status: SHIPPED | OUTPATIENT
Start: 2024-08-20

## 2024-08-20 RX ORDER — METRONIDAZOLE 250 MG/1
TABLET ORAL
Qty: 3 TABLET | Refills: 0 | Status: SHIPPED | OUTPATIENT
Start: 2024-08-20

## 2024-08-20 ASSESSMENT — ENCOUNTER SYMPTOMS
CONSTIPATION: 0
ABDOMINAL DISTENTION: 0
WHEEZING: 1
WEAKNESS: 0
NAUSEA: 0
ANAL BLEEDING: 0
RECTAL PAIN: 0
DIFFICULTY URINATING: 0
DIARRHEA: 0
HEMATURIA: 0
DYSURIA: 1
ABDOMINAL PAIN: 1
CHOKING: 0
ACTIVITY CHANGE: 0
APPETITE CHANGE: 0
SHORTNESS OF BREATH: 0
FATIGUE: 0
COUGH: 0
FREQUENCY: 1
CHILLS: 0
PALPITATIONS: 0
UNEXPECTED WEIGHT CHANGE: 0
CHEST TIGHTNESS: 0
LIGHT-HEADEDNESS: 0
FEVER: 0
AGITATION: 0
BLOOD IN STOOL: 0
VOMITING: 0
DIZZINESS: 0

## 2024-08-20 NOTE — PROGRESS NOTES
"ALEM Schmidt \"Georgiana\" is a 64 y.o. female who presented to [facility] ED on 7/10/24 for abdominal pain. 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 She was recommended to receive IV antibiotics and be seen by a specialist, she declined. She wanted to take oral antibiotics and follow up with primary urologists. She was rx cipro and flagyl and discharged.  She was referred by Dr. Lopez for a colovesical fistula. She is s/p colonoscopy with Dr. Clifton on 24, which showed extensive diverticulosis of severe severity in the sigmoid colon and medium hemorrhoids. She presents today to discuss next steps.     Patient has completed colonoscopy.  Colonoscopy completed 2024.  Patient noted to have multiple medium, extensive severe diverticula with no inflammation involving the sigmoid colon.  Patient recommended repeat colonoscopy in 10 years.  Patient reports that she is otherwise remain unchanged.  She is eating and drinking without difficulty.  Denies nausea or vomiting.  Patient's appetite has slightly improved and she reports having gained some weight back.  She is moving her bowels at baseline.  Denies hematochezia or melena.  She continues to experience dysuria, fecal urea, and pneumaturia.  Patient denies any air or stool per vagina.  She denies fevers, chills, sweats.  Does report continued dyspnea on exertion but denies wheezing.  She is currently taking ASA. She is currently using CPAP at night. She is currently out of work until , due to abdominal pain.     Colonoscopy 24 (John): Extensive diverticulosis of severe severity in the sigmoid colon  Medium hemorrhoids  Repeat in 10 years.  No specimens collected.      Former smoker (13 years ago)/No ETOH/No Illicit drug use  PMH: Pulmonary HTN, VIJAY-CPAP, HTN,   PSH: Laparoscopic Right Ovary removal,  section   No family history of CRC   Sister hx of Crohn's   Employment: Works in machine shop    Past " Medical History:   Diagnosis Date    Acute exacerbation of chronic bronchitis (Multi) 2023    COPD (chronic obstructive pulmonary disease) (Multi)     Cough 2023    Diverticulitis     GERD (gastroesophageal reflux disease)     Hyperlipidemia     Hypertension     Lung nodule     Pneumonia 1971    Procedure and treatment not carried out because of patient's decision for unspecified reasons 2021    Mammogram declined    Sleep apnea, obstructive     Wheezing on auscultation 2023       Past Surgical History:   Procedure Laterality Date    CARDIAC CATHETERIZATION       SECTION, CLASSIC      COLONOSCOPY  2022    REPEAT 5 YRS FOR POLYPS    CT GUIDED ABSCESS FLUID COLLECTION DRAINAGE  2022    CT GUIDED ABSCESS FLUID COLLECTION DRAINAGE 2022 [facility]PATIENT LEGACY    HAND SURGERY Left     thumb    OVARIAN CYST REMOVAL         Allergies   Allergen Reactions    Lisinopril Cough and Other     Cough    Sulfa (Sulfonamide Antibiotics) Rash       Review of Systems   Constitutional:  Negative for activity change, appetite change, chills, fatigue, fever and unexpected weight change.   Respiratory:  Positive for wheezing. Negative for cough, choking, chest tightness and shortness of breath.    Cardiovascular:  Negative for chest pain, palpitations and leg swelling.   Gastrointestinal:  Positive for abdominal pain. Negative for abdominal distention, anal bleeding, blood in stool, constipation, diarrhea, nausea, rectal pain and vomiting.   Genitourinary:  Positive for dysuria and frequency. Negative for difficulty urinating, dyspareunia and hematuria.        Pneumaturia and fecaluria.   Neurological:  Negative for dizziness, weakness and light-headedness.   Psychiatric/Behavioral:  Negative for agitation.        Physical Exam  Constitutional:       General: She is not in acute distress.     Appearance: Normal appearance. She is not ill-appearing.   HENT:      Head:  Normocephalic.      Mouth/Throat:      Mouth: Mucous membranes are moist.   Eyes:      Extraocular Movements: Extraocular movements intact.      Pupils: Pupils are equal, round, and reactive to light.   Cardiovascular:      Rate and Rhythm: Normal rate and regular rhythm.      Pulses: Normal pulses.      Heart sounds: Normal heart sounds. No murmur heard.  Pulmonary:      Effort: Pulmonary effort is normal. No respiratory distress.      Breath sounds: No wheezing, rhonchi or rales.   Chest:      Chest wall: No tenderness.   Abdominal:      General: There is no distension.      Palpations: Abdomen is soft. There is no mass.      Tenderness: There is abdominal tenderness. There is no guarding or rebound.      Hernia: No hernia is present.      Comments: Well healed midline incision.  Mild generalized TTP.     Genitourinary:     Rectum: Normal.   Musculoskeletal:         General: No swelling or deformity.      Cervical back: Neck supple. No rigidity.      Right lower leg: No edema.      Left lower leg: No edema.   Skin:     General: Skin is warm.      Coloration: Skin is not jaundiced or pale.   Neurological:      General: No focal deficit present.      Mental Status: She is alert and oriented to person, place, and time. Mental status is at baseline.   Psychiatric:         Mood and Affect: Mood normal.         Behavior: Behavior normal.         Thought Content: Thought content normal.         Judgment: Judgment normal.         Assessment and Plan:   #Colovesical fistula  #History of sigmoid diverticulitis  - For robotic assisted, laparoscopic sigmoidectomy with possible stoma and flexible sigmoidoscopy; plan for 9/23/2024  - PAT  - Bowel prep day before surgery  - Risks, benefits, and alternatives of surgery including risks of bleeding, infection, injury to structures, anastomotic complications, stoma, and possibility of conversion to open discussed with patient and accompanying  today.  - Perioperative  expectations discussed.  - Questions answered.    Bernabe Martinez MD   8/20/2024  7:46 PM

## 2024-08-27 ENCOUNTER — APPOINTMENT (OUTPATIENT)
Dept: SURGERY | Facility: CLINIC | Age: 64
End: 2024-08-27
Payer: COMMERCIAL

## 2024-09-03 DIAGNOSIS — J42 CHRONIC BRONCHITIS, UNSPECIFIED CHRONIC BRONCHITIS TYPE (MULTI): ICD-10-CM

## 2024-09-03 RX ORDER — BUDESONIDE, GLYCOPYRROLATE, AND FORMOTEROL FUMARATE 160; 9; 4.8 UG/1; UG/1; UG/1
AEROSOL, METERED RESPIRATORY (INHALATION)
Qty: 32.1 G | Refills: 0 | Status: SHIPPED | OUTPATIENT
Start: 2024-09-03

## 2024-09-06 ASSESSMENT — DUKE ACTIVITY SCORE INDEX (DASI)
CAN YOU WALK A BLOCK OR TWO ON LEVEL GROUND: YES
CAN YOU WALK INDOORS, SUCH AS AROUND YOUR HOUSE: YES
CAN YOU DO LIGHT WORK AROUND THE HOUSE LIKE DUSTING OR WASHING DISHES: YES
CAN YOU DO HEAVY WORK AROUND THE HOUSE LIKE SCRUBBING FLOORS OR LIFTING AND MOVING HEAVY FURNITURE: YES
TOTAL_SCORE: 45.2
CAN YOU CLIMB A FLIGHT OF STAIRS OR WALK UP A HILL: NO
CAN YOU HAVE SEXUAL RELATIONS: YES
CAN YOU DO YARD WORK LIKE RAKING LEAVES, WEEDING OR PUSHING A MOWER: YES
CAN YOU PARTICIPATE IN MODERATE RECREATIONAL ACTIVITIES LIKE GOLF, BOWLING, DANCING, DOUBLES TENNIS OR THROWING A BASEBALL OR FOOTBALL: YES
DASI METS SCORE: 8.3
CAN YOU PARTICIPATE IN STRENOUS SPORTS LIKE SWIMMING, SINGLES TENNIS, FOOTBALL, BASKETBALL, OR SKIING: NO
CAN YOU DO MODERATE WORK AROUND THE HOUSE LIKE VACUUMING, SWEEPING FLOORS OR CARRYING GROCERIES: YES
CAN YOU TAKE CARE OF YOURSELF (EAT, DRESS, BATHE, OR USE TOILET): YES
CAN YOU RUN A SHORT DISTANCE: YES

## 2024-09-06 ASSESSMENT — LIFESTYLE VARIABLES: SMOKING_STATUS: NONSMOKER

## 2024-09-06 ASSESSMENT — ACTIVITIES OF DAILY LIVING (ADL): ADL_SCORE: 0

## 2024-09-07 RX ORDER — ALBUTEROL SULFATE 90 UG/1
2 INHALANT RESPIRATORY (INHALATION) EVERY 6 HOURS PRN
COMMUNITY

## 2024-09-09 ENCOUNTER — LAB (OUTPATIENT)
Dept: LAB | Facility: LAB | Age: 64
End: 2024-09-09
Payer: COMMERCIAL

## 2024-09-09 ENCOUNTER — PRE-ADMISSION TESTING (OUTPATIENT)
Dept: PREADMISSION TESTING | Facility: HOSPITAL | Age: 64
End: 2024-09-09
Payer: COMMERCIAL

## 2024-09-09 VITALS
HEART RATE: 57 BPM | RESPIRATION RATE: 16 BRPM | TEMPERATURE: 96.8 F | DIASTOLIC BLOOD PRESSURE: 68 MMHG | BODY MASS INDEX: 31.2 KG/M2 | HEIGHT: 59 IN | SYSTOLIC BLOOD PRESSURE: 140 MMHG | WEIGHT: 154.76 LBS | OXYGEN SATURATION: 95 %

## 2024-09-09 DIAGNOSIS — N32.1 COLOVESICAL FISTULA: ICD-10-CM

## 2024-09-09 DIAGNOSIS — Z01.818 PREOP TESTING: Primary | ICD-10-CM

## 2024-09-09 DIAGNOSIS — Z01.818 PREOP TESTING: ICD-10-CM

## 2024-09-09 LAB
ANION GAP SERPL CALC-SCNC: 12 MMOL/L (ref 10–20)
BUN SERPL-MCNC: 18 MG/DL (ref 6–23)
CALCIUM SERPL-MCNC: 9.8 MG/DL (ref 8.6–10.3)
CHLORIDE SERPL-SCNC: 102 MMOL/L (ref 98–107)
CO2 SERPL-SCNC: 29 MMOL/L (ref 21–32)
CREAT SERPL-MCNC: 0.86 MG/DL (ref 0.5–1.05)
EGFRCR SERPLBLD CKD-EPI 2021: 76 ML/MIN/1.73M*2
EST. AVERAGE GLUCOSE BLD GHB EST-MCNC: 117 MG/DL
GLUCOSE SERPL-MCNC: 90 MG/DL (ref 74–99)
HBA1C MFR BLD: 5.7 %
POTASSIUM SERPL-SCNC: 4.3 MMOL/L (ref 3.5–5.3)
SODIUM SERPL-SCNC: 139 MMOL/L (ref 136–145)

## 2024-09-09 PROCEDURE — 87081 CULTURE SCREEN ONLY: CPT | Mod: STJLAB

## 2024-09-09 PROCEDURE — 83036 HEMOGLOBIN GLYCOSYLATED A1C: CPT

## 2024-09-09 PROCEDURE — 36415 COLL VENOUS BLD VENIPUNCTURE: CPT

## 2024-09-09 PROCEDURE — 80048 BASIC METABOLIC PNL TOTAL CA: CPT

## 2024-09-09 PROCEDURE — 99202 OFFICE O/P NEW SF 15 MIN: CPT | Performed by: NURSE PRACTITIONER

## 2024-09-09 RX ORDER — CHLORHEXIDINE GLUCONATE ORAL RINSE 1.2 MG/ML
SOLUTION DENTAL
Qty: 473 ML | Refills: 0 | Status: SHIPPED | OUTPATIENT
Start: 2024-09-09

## 2024-09-09 RX ORDER — LACTASE 3000 UNIT
3000 TABLET ORAL DAILY PRN
COMMUNITY

## 2024-09-09 ASSESSMENT — PAIN - FUNCTIONAL ASSESSMENT: PAIN_FUNCTIONAL_ASSESSMENT: 0-10

## 2024-09-09 NOTE — CPM/PAT H&P
"CPM/PAT Evaluation       Name: Gallo Schmidt (Gallo Schmidt \"Georgiana\")  /Age: 1960/64 y.o.     In-Person       Chief Complaint: Colon    HPI64 year old female for robotic sigmoid resection, possible open, possible stoma, flexible sigmoidoscopy 24. Patient with colovesical fistula and had colonoscopy 24 that showed extensive diverticulosis of severe severity in the sigmoid colon and medium hemorrhoids. She is having occasional lower abdominal pain and bloating. Occasional N/V.     Past Medical History:   Diagnosis Date    Acute exacerbation of chronic bronchitis (Multi) 2023    COPD (chronic obstructive pulmonary disease) (Multi)     Cough 2023    Diverticulitis     GERD (gastroesophageal reflux disease)     Hyperlipidemia     Hypertension     Lung nodule     Pneumonia     Procedure and treatment not carried out because of patient's decision for unspecified reasons 2021    Mammogram declined    Pulmonary hypertension (Multi)     Sleep apnea, obstructive     Wheezing on auscultation 2023       Past Surgical History:   Procedure Laterality Date    CARDIAC CATHETERIZATION       SECTION, CLASSIC      COLONOSCOPY  2022    REPEAT 5 YRS FOR POLYPS    CT GUIDED ABSCESS FLUID COLLECTION DRAINAGE  2022    CT GUIDED ABSCESS FLUID COLLECTION DRAINAGE 2022 [facility]    HAND SURGERY Left     thumb    OVARIAN CYST REMOVAL         Patient  reports that she is not currently sexually active and has had partner(s) who are male.    Family History   Problem Relation Name Age of Onset    Hypertension Mother Anita márquez     Stroke Father Rory márquez     COPD Father Rory márquez     Hypertension Father Rory márquez     Other (PERIPHERAL ARTERIAL DISEASE) Father Rory márquez     Thyroid disease Father Rory márquez     Heart attack Brother      Stomach cancer Maternal Grandmother      Boyle's disease Paternal Grandmother         Allergies   Allergen " Reactions    Lisinopril Cough    Sulfa (Sulfonamide Antibiotics) Rash       Prior to Admission medications    Medication Sig Start Date End Date Taking? Authorizing Provider   albuterol 90 mcg/actuation inhaler Inhale 2 puffs every 6 hours if needed for shortness of breath.    Historical Provider, MD   aspirin 81 mg chewable tablet CHEW AND SWALLOW 1 TABLET BY MOUTH ONCE DAILY 3/18/24   Tanvi Holguin PA-C   atorvastatin (Lipitor) 80 mg tablet Take 1 tablet (80 mg) by mouth once daily.  Patient taking differently: Take 1 tablet (80 mg) by mouth once daily at bedtime. 6/10/24 7/15/25  Tanvi Holguin PA-C   azelastine (Astelin) 137 mcg (0.1 %) nasal spray Administer 2 sprays into each nostril 2 times a day for 15 days. Use in each nostril as directed  Patient not taking: Reported on 9/9/2024 2/3/24 2/18/24  Presley Frederick PA-C   Breztri Aerosphere 160-9-4.8 mcg/actuation HFA aerosol inhaler 2 puffs BID May have 90 day supply. 9/3/24   Obdulio Chakraborty DO   cholecalciferol (Vitamin D-3) 25 MCG (1000 UT) tablet Take 1 tablet (25 mcg) by mouth once daily.    Historical Provider, MD   cyanocobalamin (Vitamin B-12) 1,000 mcg tablet Take 1 tablet (1,000 mcg) by mouth once daily. 11/16/23 11/15/24  Tanvi Holguin PA-C   gabapentin (Neurontin) 100 mg capsule Take one capsule by mouth at bedtime starting 3 days before surgery 8/20/24   Bernabe Martinez MD   isosorbide mononitrate ER (Imdur) 30 mg 24 hr tablet Take 1 tablet (30 mg) by mouth once daily. 5/2/24 5/2/25  Josh Navas MD   lactase (Lactaid) 3,000 unit tablet Take 1 tablet (3,000 Units) by mouth once daily as needed.    Historical Provider, MD   losartan (Cozaar) 50 mg tablet Take 1 tablet (50 mg) by mouth once daily. 8/15/24 8/15/25  Kendrick Lopez MD   metoprolol succinate XL (Toprol-XL) 25 mg 24 hr tablet TAKE 1 TABLET (25 MG) BY MOUTH ONCE DAILY.  Patient taking differently: Take 1 tablet (25 mg) by mouth once daily at bedtime.  3/14/24   Tanvi Holguin PA-C   metroNIDAZOLE (Flagyl) 250 mg tablet Take one tablet by mouth at 6:00pm, 7:00pm, 11:00pm on the day prior to surgery 8/20/24   Bernabe Martinez MD   neomycin (Mycifradin) 500 mg tablet Take two tablets (1000mg) by mouth at 6:00pm, 7:00pm, and 11:00pm on the day prior to surgery 8/20/24   Bernabe Martinez MD   omeprazole OTC (PriLOSEC OTC) 20 mg EC tablet Take 1 tablet (20 mg) by mouth once daily as needed.    Historical Provider, MD   potassium chloride (Klor-Con) 20 mEq packet DISSOLVE 1 PACKET (20 MEQ) IN FLUID AND DRINK BY MOUTH ONCE DAILY AS DIRECTED 3/18/24   Tanvi Holguin PA-C   triamterene-hydrochlorothiazid (Maxzide-25) 37.5-25 mg tablet TAKE 1 TABLET BY MOUTH ONCE DAILY. 3/18/24   Tanvi Holguin PA-C   albuterol sulfate (Proair Digihaler) 90 mcg/actuation aero powdr breath act w/sensor inhaler Inhale 2 puffs every 6 hours if needed for shortness of breath or wheezing. 10/19/21 9/7/24  Historical Provider, MD [place] Aerosphere 160-9-4.8 mcg/actuation HFA aerosol inhaler 2 puffs BID May have 90 day supply. 2/29/24 9/3/24  Obdulio Chakraborty,    lactobacillus acidophilus & bulgar (Lactinex) 1 million cell chewable tablet Chew 1 tablet 3 times daily (morning, midday, late afternoon).  9/9/24  Historical Provider, MD      Refer to updated medication list on file      Constitutional: Negative for fever, chills, or sweats   ENMT: Negative for nasal discharge, congestion, ear pain, mouth pain, throat pain   Respiratory: Negative for cough, wheezing, Positive for occ shortness of breath with exertion  Cardiac: Negative for palpitations, Positive for occasional left sided chest pain with exertion, denies recent, Follows with Dr. DAVID Navas, had cardiac catherization in 2023.  Gastrointestinal: Positive for lower abdominal pain, occasional N/V, abdominal bloating, occasional constipation  Genitourinary: Negative for dysuria, flank pain, frequency,  hematuria   Musculoskeletal: Negative for decreased ROM, pain, swelling, weakness   Neurological: Negative for dizziness, confusion, headache  Psychiatric: Negative for mood changes   Skin: Negative for itching, rash, ulcer    Hematologic/Lymph: Negative for bruising, easy bleeding  Allergic/Immunologic: Negative itching, sneezing, swelling       Physical Exam  Constitutional:       Appearance: Normal appearance.   HENT:      Head: Normocephalic.   Eyes:      Extraocular Movements: Extraocular movements intact.      Comments: glasses   Cardiovascular:      Rate and Rhythm: Normal rate and regular rhythm.      Heart sounds: Normal heart sounds.   Pulmonary:      Effort: Pulmonary effort is normal.      Breath sounds: Normal breath sounds.   Abdominal:      General: Bowel sounds are normal.      Palpations: Abdomen is soft.   Musculoskeletal:         General: Normal range of motion.      Cervical back: Normal range of motion.   Skin:     General: Skin is warm and dry.   Neurological:      Mental Status: She is alert and oriented to person, place, and time.   Psychiatric:         Mood and Affect: Mood normal.          PAT AIRWAY:   Airway:     Neck ROM::  Full   upper dentures and lower dentures    Anesthesia:  Hx of slow to wake from anesthesia      Visit Vitals  /68   Pulse 57   Temp 36 °C (96.8 °F) (Temporal)   Resp 16       DASI Risk Score      Flowsheet Row Most Recent Value   DASI SCORE 45.2   METS Score (Will be calculated only when all the questions are answered) 8.3          Caprini DVT Assessment      Flowsheet Row Most Recent Value   DVT Score 13   Current Status Major surgery planned, lasting over 3 hours, COPD   History Prior major surgery, COPD   Age 60-75 years   BMI 31-40 (Obesity)          Modified Frailty Index      Flowsheet Row Most Recent Value   Modified Frailty Index Calculator .2727          CHADS2 Stroke Risk  Current as of 53 minutes ago        N/A 3 to 100%: High Risk   2 to < 3%:  Medium Risk   0 to < 2%: Low Risk     Last Change: N/A          This score determines the patient's risk of having a stroke if the patient has atrial fibrillation.        This score is not applicable to this patient. Components are not calculated.          Revised Cardiac Risk Index      Flowsheet Row Most Recent Value   Revised Cardiac Risk Calculator 0          Apfel Simplified Score      Flowsheet Row Most Recent Value   Apfel Simplified Score Calculator 3          Risk Analysis Index Results This Encounter         9/6/2024  1120             SCHILLING Cancer History: Patient does not indicate history of cancer    Total Risk Analysis Index Score Without Cancer: 25    Total Risk Analysis Index Score: 25          Stop Bang Score      Flowsheet Row Most Recent Value   Do you snore loudly? 1   Do you often feel tired or fatigued after your sleep? 0   Has anyone ever observed you stop breathing in your sleep? 1   Do you have or are you being treated for high blood pressure? 1   Recent BMI (Calculated) 31.3   Is BMI greater than 35 kg/m2? 0=No   Age older than 50 years old? 1=Yes   Is your neck circumference greater than 17 inches (Male) or 16 inches (Female)? 0   Gender - Male 0=No   STOP-BANG Total Score 4          Lab Results   Component Value Date    GLUCOSE 97 07/14/2024    CALCIUM 8.3 (L) 07/14/2024     (L) 07/14/2024    K 3.9 07/14/2024    CO2 27 07/14/2024    CL 97 (L) 07/14/2024    BUN 19 07/14/2024    CREATININE 0.87 07/14/2024      Lab Results   Component Value Date    WBC 7.4 07/14/2024    HGB 12.0 07/14/2024    HCT 37.5 07/14/2024    MCV 95 07/14/2024     07/14/2024        Assessment and Plan:     64 year old female for robotic sigmoid resection, possible open, possible stoma, flexible sigmoidoscopy 9/23/24.   BMP and HgbAIC today  MRSA swab and oral mouth rinse ordered per protocol   EKG on file under media 7/15/24    Cardiovascular:  HTN: on medication  Hyperlipidemia: on Statin  Follows with   DAVID Navas (has appt 9/12)  Cardiac catherization on file 1/20/23  [facility] Activity Status Index (DASI)  DASI Score: 45.2   MET Score: 8.3   RCI 0, 3.9% risk for postoperative MACE     Respiratory:  Pulmonary HTN  COPD  Sleep apnea: uses CPAP  Former smoker quit 13 years ago  Follows with pulmonary (has appt 9/12)    GI:  GERD  Diverticulosis

## 2024-09-09 NOTE — PREPROCEDURE INSTRUCTIONS
Medication List            Accurate as of September 9, 2024 10:25 AM. Always use your most recent med list.                albuterol 90 mcg/actuation inhaler  Medication Adjustments for Surgery: Take/Use as prescribed     aspirin 81 mg chewable tablet  CHEW AND SWALLOW 1 TABLET BY MOUTH ONCE DAILY  Additional Medication Adjustments for Surgery: Other (Comment)  Notes to patient: Collaborate with physician     atorvastatin 80 mg tablet  Commonly known as: Lipitor  Take 1 tablet (80 mg) by mouth once daily.  Medication Adjustments for Surgery: Take/Use as prescribed     azelastine 137 mcg (0.1 %) nasal spray  Commonly known as: Astelin  Administer 2 sprays into each nostril 2 times a day for 15 days. Use in each nostril as directed  Medication Adjustments for Surgery: Take/Use as prescribed     Breztri Aerosphere 160-9-4.8 mcg/actuation HFA aerosol inhaler  Generic drug: budesonide-glycopyr-formoterol  2 puffs BID May have 90 day supply.  Medication Adjustments for Surgery: Take/Use as prescribed     chlorhexidine 0.12 % solution  Commonly known as: Peridex  15 milliliter(s) orally once a day for 2 doses 15 ml  the night before surgery and 15 ml morning of surgery - swish for 30 seconds -DO NOT SWALLOW, SPIT OUT     cholecalciferol 25 MCG (1000 UT) tablet  Commonly known as: Vitamin D-3  Additional Medication Adjustments for Surgery: Take last dose 7 days before surgery     cyanocobalamin 1,000 mcg tablet  Commonly known as: Vitamin B-12  Take 1 tablet (1,000 mcg) by mouth once daily.  Additional Medication Adjustments for Surgery: Take last dose 7 days before surgery     gabapentin 100 mg capsule  Commonly known as: Neurontin  Take one capsule by mouth at bedtime starting 3 days before surgery  Medication Adjustments for Surgery: Take/Use as prescribed     isosorbide mononitrate ER 30 mg 24 hr tablet  Commonly known as: Imdur  Take 1 tablet (30 mg) by mouth once daily.  Medication Adjustments for Surgery: Take/Use  as prescribed     lactase 3,000 unit tablet  Commonly known as: Lactaid  Additional Medication Adjustments for Surgery: Take last dose 7 days before surgery     losartan 50 mg tablet  Commonly known as: Cozaar  Take 1 tablet (50 mg) by mouth once daily.  Medication Adjustments for Surgery: Take last dose 1 day (24 hours) before surgery     metoprolol succinate XL 25 mg 24 hr tablet  Commonly known as: Toprol-XL  TAKE 1 TABLET (25 MG) BY MOUTH ONCE DAILY.  Medication Adjustments for Surgery: Take/Use as prescribed     metroNIDAZOLE 250 mg tablet  Commonly known as: Flagyl  Take one tablet by mouth at 6:00pm, 7:00pm, 11:00pm on the day prior to surgery  Medication Adjustments for Surgery: Take/Use as prescribed     neomycin 500 mg tablet  Commonly known as: Mycifradin  Take two tablets (1000mg) by mouth at 6:00pm, 7:00pm, and 11:00pm on the day prior to surgery  Medication Adjustments for Surgery: Take/Use as prescribed     omeprazole OTC 20 mg EC tablet  Commonly known as: PriLOSEC OTC  Medication Adjustments for Surgery: Take/Use as prescribed     potassium chloride 20 mEq packet  Commonly known as: Klor-Con  DISSOLVE 1 PACKET (20 MEQ) IN FLUID AND DRINK BY MOUTH ONCE DAILY AS DIRECTED  Medication Adjustments for Surgery: Take/Use as prescribed     triamterene-hydrochlorothiazid 37.5-25 mg tablet  Commonly known as: Maxzide-25  TAKE 1 TABLET BY MOUTH ONCE DAILY.  Medication Adjustments for Surgery: Take/Use as prescribed                                  PRE-OPERATIVE INSTRUCTIONS    You will receive notification one business day prior to your procedure to confirm your arrival time. It is important that you answer your phone and/or check your messages during this time. If you do not hear from the surgery center by 5 pm. the day before your procedure, please call [phone].     Please enter the building through the Outpatient entrance and take the elevator off the lobby to the 2nd floor then check in at the  Outpatient Surgery desk on the 2nd floor.    INSTRUCTIONS:  Talk to your surgeon for instructions if you should stop your aspirin, blood thinner, or diabetes medicines.  DO NOT take any multivitamins or over the counter supplements for 7-10 days before surgery.  If not being admitted, you must have an adult immediately available to drive you home after surgery. We also highly recommend you have someone stay with you for the entire day and night of your surgery.  For children having surgery, a parent or legal guardian must accompany them to the surgery center. If this is not possible, please call [phone] to make additional arrangements.  For adults who are unable to consent or make medical decisions for themselves, a legal guardian or Power of  must accompany them to the surgery center. If this is not possible, please call [phone] to make additional arrangements.  Wear comfortable, loose fitting clothing.  All jewelry and piercings must be removed. If you are unable to remove an item or have a dermal piercing, please be sure to tell the nurse when you arrive for surgery.  Nail polish and make-up must be removed.  Avoid smoking or consuming alcohol for 24 hours before surgery.  To help prevent infection, please take a shower/bath and wash your hair the night before and/or morning of surgery (or follow other specific bathing instructions provided).    Preoperative Fasting Guidelines    Why must I stop eating and drinking near surgery time?  With sedation, food or liquid in your stomach can enter your lungs causing serious complications  Increases nausea and vomiting    When do I need to stop eating and drinking before my surgery?  Do not eat any solid food after midnight the night before your surgery/procedure unless otherwise instructed by your surgeon.   You may have up to 13.5 ounces of clear liquid until TWO hours before your instructed arrival time to the hospital.  This includes water, black  tea/coffee, (no milk or cream) apple juice, and electrolyte drinks (Gatorade).   You may chew gum until TWO hours before your surgery/procedure      If applicable, notify your surgeons office immediately of any new skin changes that occur to the surgical limb.      If you have any questions or concerns, please call Pre-Admission Testing at [phone].

## 2024-09-11 LAB — STAPHYLOCOCCUS SPEC CULT: NORMAL

## 2024-09-11 NOTE — PROGRESS NOTES
Cardiology Subsequent Encounter Clinic Note  Name: Gallo Schmidt  MRN: [id number]  : 1960    CC: Elevated calcium score    Active Issues:  Gallo Schmidt is a 64 y.o. female with a medical history of COPD, COVID-19 (2021), peripheral arterial disease, HLD, sleep apnea, here for the evaluation of the following complaints:     Elevated coronary artery calcium score  -332 2022  Echocardiogram/stress test performed 2021 were unremarkable.  -Underwent a left heart catheterization 2023; it showed only nonobstructive disease however LVEDP was minimally elevated.      Patient notes occasional twinges of chest discomfort radiating to her arm that occurs paroxysmally with no relation to exertion.  With exertion she does have some dyspnea; however it has been improving in the past few months.  Denies any orthopnea/PND.  Denies any lower extremity edema.        Past Medical History  Past Medical History:   Diagnosis Date    Acute exacerbation of chronic bronchitis (Multi) 2023    COPD (chronic obstructive pulmonary disease) (Multi)     Cough 2023    Diverticulitis     GERD (gastroesophageal reflux disease)     Hyperlipidemia     Hypertension     Lung nodule     Pneumonia     Procedure and treatment not carried out because of patient's decision for unspecified reasons 2021    Mammogram declined    Pulmonary hypertension (Multi)     Sleep apnea, obstructive     Wheezing on auscultation 2023       Past Surgical History  Past Surgical History:   Procedure Laterality Date    CARDIAC CATHETERIZATION       SECTION, CLASSIC      COLONOSCOPY  2022    REPEAT 5 YRS FOR POLYPS    CT GUIDED ABSCESS FLUID COLLECTION DRAINAGE  2022    CT GUIDED ABSCESS FLUID COLLECTION DRAINAGE 2022 [facility]    HAND SURGERY Left     thumb    OVARIAN CYST REMOVAL         Medications  Current Outpatient Medications on File Prior  to Visit   Medication Sig Dispense Refill    albuterol 90 mcg/actuation inhaler Inhale 2 puffs every 6 hours if needed for shortness of breath.      aspirin 81 mg chewable tablet CHEW AND SWALLOW 1 TABLET BY MOUTH ONCE DAILY 90 tablet 3    atorvastatin (Lipitor) 80 mg tablet Take 1 tablet (80 mg) by mouth once daily. (Patient taking differently: Take 1 tablet (80 mg) by mouth once daily at bedtime.) 100 tablet 3    Breztri Aerosphere 160-9-4.8 mcg/actuation HFA aerosol inhaler 2 puffs BID May have 90 day supply. 32.1 g 0    chlorhexidine (Peridex) 0.12 % solution 15 milliliter(s) orally once a day for 2 doses 15 ml  the night before surgery and 15 ml morning of surgery - swish for 30 seconds -DO NOT SWALLOW, SPIT  mL 0    cholecalciferol (Vitamin D-3) 25 MCG (1000 UT) tablet Take 1 tablet (25 mcg) by mouth once daily.      cyanocobalamin (Vitamin B-12) 1,000 mcg tablet Take 1 tablet (1,000 mcg) by mouth once daily. 30 tablet 11    gabapentin (Neurontin) 100 mg capsule Take one capsule by mouth at bedtime starting 3 days before surgery 3 capsule 0    isosorbide mononitrate ER (Imdur) 30 mg 24 hr tablet Take 1 tablet (30 mg) by mouth once daily. 90 tablet 3    losartan (Cozaar) 50 mg tablet Take 1 tablet (50 mg) by mouth once daily. 30 tablet 11    metoprolol succinate XL (Toprol-XL) 25 mg 24 hr tablet TAKE 1 TABLET (25 MG) BY MOUTH ONCE DAILY. (Patient taking differently: Take 1 tablet (25 mg) by mouth once daily at bedtime.) 90 tablet 3    metroNIDAZOLE (Flagyl) 250 mg tablet Take one tablet by mouth at 6:00pm, 7:00pm, 11:00pm on the day prior to surgery 3 tablet 0    neomycin (Mycifradin) 500 mg tablet Take two tablets (1000mg) by mouth at 6:00pm, 7:00pm, and 11:00pm on the day prior to surgery 6 tablet 0    omeprazole OTC (PriLOSEC OTC) 20 mg EC tablet Take 1 tablet (20 mg) by mouth once daily as needed.      potassium chloride (Klor-Con) 20 mEq packet DISSOLVE 1 PACKET (20 MEQ) IN FLUID AND DRINK BY MOUTH  ONCE DAILY AS DIRECTED 90 packet 3    triamterene-hydrochlorothiazid (Maxzide-25) 37.5-25 mg tablet TAKE 1 TABLET BY MOUTH ONCE DAILY. 90 tablet 3    azelastine (Astelin) 137 mcg (0.1 %) nasal spray Administer 2 sprays into each nostril 2 times a day for 15 days. Use in each nostril as directed (Patient not taking: Reported on 2024) 30 mL 0    lactase (Lactaid) 3,000 unit tablet Take 1 tablet (3,000 Units) by mouth once daily as needed.      [DISCONTINUED] albuterol sulfate (Proair Digihaler) 90 mcg/actuation aero powdr breath act w/sensor inhaler Inhale 2 puffs every 6 hours if needed for shortness of breath or wheezing.      [DISCONTINUED] lactobacillus acidophilus & bulgar (Lactinex) 1 million cell chewable tablet Chew 1 tablet 3 times daily (morning, midday, late afternoon).       No current facility-administered medications on file prior to visit.       Allergies  Allergies   Allergen Reactions    Lisinopril Cough    Sulfa (Sulfonamide Antibiotics) Rash       Social History  Social History     Tobacco Use    Smoking status: Former     Current packs/day: 0.00     Average packs/day: 1.5 packs/day for 33.0 years (49.5 ttl pk-yrs)     Types: Cigarettes     Start date: 1978     Quit date: 2011     Years since quittin.7     Passive exposure: Current    Smokeless tobacco: Never   Vaping Use    Vaping status: Never Used   Substance Use Topics    Alcohol use: Never    Drug use: Never       Family History  Family History   Problem Relation Name Age of Onset    Hypertension Mother Anita márquez     Stroke Father Rory márquez     COPD Father Rory márquez     Hypertension Father Rory márquez     Other (PERIPHERAL ARTERIAL DISEASE) Father Rory márquez     Thyroid disease Father Rory márquez     Heart attack Brother      Stomach cancer Maternal Grandmother      Bailey's disease Paternal Grandmother         Physical Examination  Vitals: /72 (BP Location: Left arm, Patient Position: Sitting)   Pulse 79   Ht 1.499 m  "(4' 11\")   Wt 69.9 kg (154 lb 1.6 oz)   SpO2 97%   BMI 31.12 kg/m²   General: awake, alert and oriented. No acute distress.   Skin: Skin is warm, dry and intact without rashes or lesions. Appropriate color for ethnicity. Nail beds pink with no cyanosis or clubbing  HEENT: normocephalic, atraumatic; conjunctivae are clear without exudates or hemorrhage. Sclera is non-icteric. Eyelids are normal in appearance without swelling or lesions. Hearing intact. Nares are patent bilaterally. Moist mucous membranes.   Cardiovascular: Regular. No murmurs, gallops, or rubs are auscultated. S1 and S2 are heard and are of normal intensity. No JVD, no carotid bruits  Respiratory: Thorax symmetric. CTAB, breath sounds vesicular. No crackles, wheezes or ronchi.   Gastrointestinal: soft, non-distended, BS + x 4  Genitourinary: exam deferred  Musculoskeletal: moves all extremities  Extremities: pulses palpable bilaterally; no swelling or erythema; no edema  Neurological: alert & oriented x 3; no focal deficits  Psychiatric: appropriate mood and affect       Labs/Imaging/Procedures    Lab Results   Component Value Date    HGB 12.0 07/14/2024    HGB 12.4 07/11/2024    HGB 12.7 05/31/2024     07/14/2024    WBC 7.4 07/14/2024     09/09/2024    K 4.3 09/09/2024    CREATININE 0.86 09/09/2024    CREATININE 0.87 07/14/2024    CREATININE 0.92 07/11/2024    BUN 18 09/09/2024    CALCIUM 9.8 09/09/2024    INR 1.2 (H) 01/17/2022    TROPHS 5 05/17/2024    TROPHS 5 05/17/2024    LDLF 84 05/12/2023     No echocardiogram results found for the past 12 months    9/2021 Echo:   1. The left ventricular systolic function is normal.   2. Mildly elevated RVSP.   3. Sleep study is suggested.   4. There is mild aortic valve regurgitation.    Ginna Schmidt is a 64 y.o. female  with a medical history of COPD, COVID-19 (November 2021), peripheral arterial disease, HLD, sleep apnea, here for the evaluation of the following " complaints:     Elevated coronary artery calcium score  -332 November 2022  Echocardiogram/stress test performed September 2021 were unremarkable.  -Underwent a left heart catheterization January 20, 2023; it showed only nonobstructive disease however LVEDP was minimally elevated.      Plan:  -Patient notes that she was off her statin for 6 months in the past; however is now back on it.  Her LDL was 142 mg/dL May 2024; he is now back on atorvastatin we will recheck  -Continue aspirin/statin for elevated coronary artery calcium score.  No clear symptoms to suggest worsening angina at present  -RTC 1 year    Josh Navas MD  Advanced Heart Failure/Transplant Cardiology  Cardio-Oncology  [facility]

## 2024-09-12 ENCOUNTER — APPOINTMENT (OUTPATIENT)
Dept: CARDIOLOGY | Facility: CLINIC | Age: 64
End: 2024-09-12
Payer: COMMERCIAL

## 2024-09-12 ENCOUNTER — OFFICE VISIT (OUTPATIENT)
Dept: PULMONOLOGY | Facility: CLINIC | Age: 64
End: 2024-09-12
Payer: COMMERCIAL

## 2024-09-12 VITALS
BODY MASS INDEX: 31.07 KG/M2 | OXYGEN SATURATION: 97 % | SYSTOLIC BLOOD PRESSURE: 130 MMHG | HEART RATE: 79 BPM | WEIGHT: 154.1 LBS | HEIGHT: 59 IN | DIASTOLIC BLOOD PRESSURE: 72 MMHG

## 2024-09-12 VITALS
BODY MASS INDEX: 31.45 KG/M2 | HEART RATE: 65 BPM | OXYGEN SATURATION: 95 % | WEIGHT: 156 LBS | DIASTOLIC BLOOD PRESSURE: 69 MMHG | TEMPERATURE: 98.2 F | HEIGHT: 59 IN | SYSTOLIC BLOOD PRESSURE: 109 MMHG

## 2024-09-12 DIAGNOSIS — R06.09 DYSPNEA ON EXERTION: ICD-10-CM

## 2024-09-12 DIAGNOSIS — E78.2 MIXED HYPERLIPIDEMIA: Primary | ICD-10-CM

## 2024-09-12 DIAGNOSIS — R93.1 ELEVATED CORONARY ARTERY CALCIUM SCORE: ICD-10-CM

## 2024-09-12 DIAGNOSIS — J41.8 MIXED SIMPLE AND MUCOPURULENT CHRONIC BRONCHITIS (MULTI): Primary | ICD-10-CM

## 2024-09-12 PROCEDURE — 3075F SYST BP GE 130 - 139MM HG: CPT | Performed by: STUDENT IN AN ORGANIZED HEALTH CARE EDUCATION/TRAINING PROGRAM

## 2024-09-12 PROCEDURE — 3078F DIAST BP <80 MM HG: CPT | Performed by: INTERNAL MEDICINE

## 2024-09-12 PROCEDURE — 3008F BODY MASS INDEX DOCD: CPT | Performed by: STUDENT IN AN ORGANIZED HEALTH CARE EDUCATION/TRAINING PROGRAM

## 2024-09-12 PROCEDURE — 3074F SYST BP LT 130 MM HG: CPT | Performed by: INTERNAL MEDICINE

## 2024-09-12 PROCEDURE — 99214 OFFICE O/P EST MOD 30 MIN: CPT | Performed by: STUDENT IN AN ORGANIZED HEALTH CARE EDUCATION/TRAINING PROGRAM

## 2024-09-12 PROCEDURE — 3078F DIAST BP <80 MM HG: CPT | Performed by: STUDENT IN AN ORGANIZED HEALTH CARE EDUCATION/TRAINING PROGRAM

## 2024-09-12 PROCEDURE — 1036F TOBACCO NON-USER: CPT | Performed by: STUDENT IN AN ORGANIZED HEALTH CARE EDUCATION/TRAINING PROGRAM

## 2024-09-12 PROCEDURE — 99214 OFFICE O/P EST MOD 30 MIN: CPT | Performed by: INTERNAL MEDICINE

## 2024-09-12 PROCEDURE — 1036F TOBACCO NON-USER: CPT | Performed by: INTERNAL MEDICINE

## 2024-09-12 PROCEDURE — 3008F BODY MASS INDEX DOCD: CPT | Performed by: INTERNAL MEDICINE

## 2024-09-12 NOTE — PROGRESS NOTES
"Subjective   Patient ID: Gallo Schmidt \"Ernie" is a 64 y.o. female who presents for No chief complaint on file..  HPI  Patient was seen today on a follow-up visit.  She reports that she has significant problems with a duct that is between her bowel and bladder that is causing her significant distress.  She apparently has appointment scheduled at [facility] at the end of September for partial bowel resection.  The patient is on Breztri 2 puffs twice daily and albuterol inhaler at 2 puffs 4 times daily as needed shortness of breath.  Patient does occasionally have some cough of clear phlegm which she attributes to her chronic rhinitis and she believes it is due to her CPAP use.  I do not follow her for her CPAP therapy.  Review of Systems  Patient denies having any fever, chills or sore throat but does have the above noted rhinitis with posterior nasal drainage.  The patient is not a smoker.  Objective   Physical Exam  HEENT, no inflammation on evaluation of her oropharynx.  Pulmonary, she has diminished breath sounds bilaterally but otherwise clear.  There was no wheezing noted.  Heart, heart sounds are regular rate and rhythm.  Extremities, no cyanosis, clubbing or pretibial edema.  The patient is alert and oriented x 3.  Assessment/Plan        Impressions:  1.  Chronic bronchitis.  2.  Dyspnea on exertion.  Recommendations:  1.  Stay on present bronchodilator therapy as listed above.  2.  Follow-up with the patient in 4 months.  3.  Patient can contact the office if she has any new problems with her breathing in the interim.      This note was transcribed using the Dragon Dictation system.  There may be grammatical, punctuation, or verbiage errors that occur with voice recognition programs.    Obdulio Chakraborty DO 09/12/24 11:11 AM   "

## 2024-09-13 ENCOUNTER — TELEPHONE (OUTPATIENT)
Dept: CARDIOLOGY | Facility: CLINIC | Age: 64
End: 2024-09-13
Payer: COMMERCIAL

## 2024-09-13 DIAGNOSIS — E78.5 HYPERLIPIDEMIA, UNSPECIFIED HYPERLIPIDEMIA TYPE: Primary | ICD-10-CM

## 2024-09-13 NOTE — TELEPHONE ENCOUNTER
PATIENT CALLED STATING THAT YOU WANTED HER TO HAVE HER CHOLESTEROL CHECKED AND SHE WENT TODAY BUT THEY DID NOT HAVE AN ORDER FOR IT.  CAN YOU PLEASE PUT THAT ORDER IN AND SHE WILL GO ON MONDAY?

## 2024-09-16 ENCOUNTER — LAB (OUTPATIENT)
Dept: LAB | Facility: LAB | Age: 64
End: 2024-09-16
Payer: COMMERCIAL

## 2024-09-16 DIAGNOSIS — E78.5 HYPERLIPIDEMIA, UNSPECIFIED HYPERLIPIDEMIA TYPE: ICD-10-CM

## 2024-09-16 LAB
ANION GAP SERPL CALC-SCNC: 10 MMOL/L (ref 10–20)
BUN SERPL-MCNC: 16 MG/DL (ref 6–23)
CALCIUM SERPL-MCNC: 9.7 MG/DL (ref 8.6–10.3)
CHLORIDE SERPL-SCNC: 104 MMOL/L (ref 98–107)
CHOLEST SERPL-MCNC: 210 MG/DL (ref 0–199)
CHOLESTEROL/HDL RATIO: 2.6
CO2 SERPL-SCNC: 30 MMOL/L (ref 21–32)
CREAT SERPL-MCNC: 0.87 MG/DL (ref 0.5–1.05)
EGFRCR SERPLBLD CKD-EPI 2021: 75 ML/MIN/1.73M*2
GLUCOSE SERPL-MCNC: 89 MG/DL (ref 74–99)
HDLC SERPL-MCNC: 81 MG/DL
LDLC SERPL CALC-MCNC: 116 MG/DL
NON HDL CHOLESTEROL: 129 MG/DL (ref 0–149)
POTASSIUM SERPL-SCNC: 4.8 MMOL/L (ref 3.5–5.3)
SODIUM SERPL-SCNC: 139 MMOL/L (ref 136–145)
TRIGL SERPL-MCNC: 64 MG/DL (ref 0–149)
VLDL: 13 MG/DL (ref 0–40)

## 2024-09-16 PROCEDURE — 80048 BASIC METABOLIC PNL TOTAL CA: CPT

## 2024-09-16 PROCEDURE — 80061 LIPID PANEL: CPT

## 2024-09-16 PROCEDURE — 36415 COLL VENOUS BLD VENIPUNCTURE: CPT

## 2024-09-18 ENCOUNTER — PREP FOR PROCEDURE (OUTPATIENT)
Dept: SURGERY | Facility: HOSPITAL | Age: 64
End: 2024-09-18
Payer: COMMERCIAL

## 2024-09-18 DIAGNOSIS — N32.1 COLOVESICAL FISTULA: Primary | ICD-10-CM

## 2024-09-22 RX ORDER — CIPROFLOXACIN 2 MG/ML
400 INJECTION, SOLUTION INTRAVENOUS ONCE
Status: CANCELLED | OUTPATIENT
Start: 2024-09-22 | End: 2024-09-22

## 2024-09-22 RX ORDER — METRONIDAZOLE 500 MG/100ML
500 INJECTION, SOLUTION INTRAVENOUS ONCE
Status: CANCELLED | OUTPATIENT
Start: 2024-09-22 | End: 2024-09-22

## 2024-09-22 RX ORDER — SODIUM CHLORIDE, SODIUM LACTATE, POTASSIUM CHLORIDE, CALCIUM CHLORIDE 600; 310; 30; 20 MG/100ML; MG/100ML; MG/100ML; MG/100ML
100 INJECTION, SOLUTION INTRAVENOUS CONTINUOUS
Status: CANCELLED | OUTPATIENT
Start: 2024-09-22

## 2024-09-23 ENCOUNTER — ANESTHESIA EVENT (OUTPATIENT)
Dept: OPERATING ROOM | Facility: HOSPITAL | Age: 64
End: 2024-09-23
Payer: COMMERCIAL

## 2024-09-23 ENCOUNTER — HOSPITAL ENCOUNTER (INPATIENT)
Facility: HOSPITAL | Age: 64
LOS: 4 days | Discharge: HOME | End: 2024-09-27
Attending: STUDENT IN AN ORGANIZED HEALTH CARE EDUCATION/TRAINING PROGRAM | Admitting: STUDENT IN AN ORGANIZED HEALTH CARE EDUCATION/TRAINING PROGRAM
Payer: COMMERCIAL

## 2024-09-23 ENCOUNTER — ANESTHESIA (OUTPATIENT)
Dept: OPERATING ROOM | Facility: HOSPITAL | Age: 64
End: 2024-09-23
Payer: COMMERCIAL

## 2024-09-23 DIAGNOSIS — K66.0 PERITONEAL ADHESIONS: ICD-10-CM

## 2024-09-23 DIAGNOSIS — N32.1 COLOVESICAL FISTULA: Primary | ICD-10-CM

## 2024-09-23 LAB
ABO GROUP (TYPE) IN BLOOD: NORMAL
ANTIBODY SCREEN: NORMAL
INR PPP: 1.1 (ref 0.9–1.1)
PROTHROMBIN TIME: 12.2 SECONDS (ref 9.8–12.8)
RH FACTOR (ANTIGEN D): NORMAL

## 2024-09-23 PROCEDURE — 86901 BLOOD TYPING SEROLOGIC RH(D): CPT | Performed by: STUDENT IN AN ORGANIZED HEALTH CARE EDUCATION/TRAINING PROGRAM

## 2024-09-23 PROCEDURE — 99231 SBSQ HOSP IP/OBS SF/LOW 25: CPT | Performed by: STUDENT IN AN ORGANIZED HEALTH CARE EDUCATION/TRAINING PROGRAM

## 2024-09-23 PROCEDURE — 94640 AIRWAY INHALATION TREATMENT: CPT

## 2024-09-23 PROCEDURE — 0DTN4ZZ RESECTION OF SIGMOID COLON, PERCUTANEOUS ENDOSCOPIC APPROACH: ICD-10-PCS | Performed by: STUDENT IN AN ORGANIZED HEALTH CARE EDUCATION/TRAINING PROGRAM

## 2024-09-23 PROCEDURE — 2500000005 HC RX 250 GENERAL PHARMACY W/O HCPCS: Performed by: STUDENT IN AN ORGANIZED HEALTH CARE EDUCATION/TRAINING PROGRAM

## 2024-09-23 PROCEDURE — 2720000007 HC OR 272 NO HCPCS: Performed by: STUDENT IN AN ORGANIZED HEALTH CARE EDUCATION/TRAINING PROGRAM

## 2024-09-23 PROCEDURE — 2500000004 HC RX 250 GENERAL PHARMACY W/ HCPCS (ALT 636 FOR OP/ED): Performed by: STUDENT IN AN ORGANIZED HEALTH CARE EDUCATION/TRAINING PROGRAM

## 2024-09-23 PROCEDURE — 45341 SIGMOIDOSCOPY W/ULTRASOUND: CPT | Performed by: STUDENT IN AN ORGANIZED HEALTH CARE EDUCATION/TRAINING PROGRAM

## 2024-09-23 PROCEDURE — 2500000004 HC RX 250 GENERAL PHARMACY W/ HCPCS (ALT 636 FOR OP/ED): Mod: JZ | Performed by: STUDENT IN AN ORGANIZED HEALTH CARE EDUCATION/TRAINING PROGRAM

## 2024-09-23 PROCEDURE — 2500000004 HC RX 250 GENERAL PHARMACY W/ HCPCS (ALT 636 FOR OP/ED): Performed by: ANESTHESIOLOGIST ASSISTANT

## 2024-09-23 PROCEDURE — 2500000005 HC RX 250 GENERAL PHARMACY W/O HCPCS: Performed by: ANESTHESIOLOGIST ASSISTANT

## 2024-09-23 PROCEDURE — A44207 PR LAP,SURG,COLECTOMY,W/ANAST: Performed by: ANESTHESIOLOGIST ASSISTANT

## 2024-09-23 PROCEDURE — 58660 LAPAROSCOPY LYSIS: CPT | Performed by: STUDENT IN AN ORGANIZED HEALTH CARE EDUCATION/TRAINING PROGRAM

## 2024-09-23 PROCEDURE — 44213 LAP MOBIL SPLENIC FL ADD-ON: CPT | Performed by: STUDENT IN AN ORGANIZED HEALTH CARE EDUCATION/TRAINING PROGRAM

## 2024-09-23 PROCEDURE — 44207 L COLECTOMY/COLOPROCTOSTOMY: CPT | Performed by: STUDENT IN AN ORGANIZED HEALTH CARE EDUCATION/TRAINING PROGRAM

## 2024-09-23 PROCEDURE — 36415 COLL VENOUS BLD VENIPUNCTURE: CPT | Performed by: STUDENT IN AN ORGANIZED HEALTH CARE EDUCATION/TRAINING PROGRAM

## 2024-09-23 PROCEDURE — 2500000002 HC RX 250 W HCPCS SELF ADMINISTERED DRUGS (ALT 637 FOR MEDICARE OP, ALT 636 FOR OP/ED): Performed by: STUDENT IN AN ORGANIZED HEALTH CARE EDUCATION/TRAINING PROGRAM

## 2024-09-23 PROCEDURE — 3600000009 HC OR TIME - EACH INCREMENTAL 1 MINUTE - PROCEDURE LEVEL FOUR: Performed by: STUDENT IN AN ORGANIZED HEALTH CARE EDUCATION/TRAINING PROGRAM

## 2024-09-23 PROCEDURE — 3700000001 HC GENERAL ANESTHESIA TIME - INITIAL BASE CHARGE: Performed by: STUDENT IN AN ORGANIZED HEALTH CARE EDUCATION/TRAINING PROGRAM

## 2024-09-23 PROCEDURE — A44207 PR LAP,SURG,COLECTOMY,W/ANAST: Performed by: STUDENT IN AN ORGANIZED HEALTH CARE EDUCATION/TRAINING PROGRAM

## 2024-09-23 PROCEDURE — 7100000002 HC RECOVERY ROOM TIME - EACH INCREMENTAL 1 MINUTE: Performed by: STUDENT IN AN ORGANIZED HEALTH CARE EDUCATION/TRAINING PROGRAM

## 2024-09-23 PROCEDURE — 0DNU4ZZ RELEASE OMENTUM, PERCUTANEOUS ENDOSCOPIC APPROACH: ICD-10-PCS | Performed by: STUDENT IN AN ORGANIZED HEALTH CARE EDUCATION/TRAINING PROGRAM

## 2024-09-23 PROCEDURE — 2500000001 HC RX 250 WO HCPCS SELF ADMINISTERED DRUGS (ALT 637 FOR MEDICARE OP): Performed by: STUDENT IN AN ORGANIZED HEALTH CARE EDUCATION/TRAINING PROGRAM

## 2024-09-23 PROCEDURE — 3600000004 HC OR TIME - INITIAL BASE CHARGE - PROCEDURE LEVEL FOUR: Performed by: STUDENT IN AN ORGANIZED HEALTH CARE EDUCATION/TRAINING PROGRAM

## 2024-09-23 PROCEDURE — 85610 PROTHROMBIN TIME: CPT | Performed by: STUDENT IN AN ORGANIZED HEALTH CARE EDUCATION/TRAINING PROGRAM

## 2024-09-23 PROCEDURE — 3700000002 HC GENERAL ANESTHESIA TIME - EACH INCREMENTAL 1 MINUTE: Performed by: STUDENT IN AN ORGANIZED HEALTH CARE EDUCATION/TRAINING PROGRAM

## 2024-09-23 PROCEDURE — 7100000001 HC RECOVERY ROOM TIME - INITIAL BASE CHARGE: Performed by: STUDENT IN AN ORGANIZED HEALTH CARE EDUCATION/TRAINING PROGRAM

## 2024-09-23 PROCEDURE — 1100000001 HC PRIVATE ROOM DAILY

## 2024-09-23 RX ORDER — ONDANSETRON HYDROCHLORIDE 2 MG/ML
4 INJECTION, SOLUTION INTRAVENOUS ONCE AS NEEDED
Status: COMPLETED | OUTPATIENT
Start: 2024-09-23 | End: 2024-09-23

## 2024-09-23 RX ORDER — PANTOPRAZOLE SODIUM 40 MG/1
40 TABLET, DELAYED RELEASE ORAL
Status: DISCONTINUED | OUTPATIENT
Start: 2024-09-24 | End: 2024-09-27 | Stop reason: HOSPADM

## 2024-09-23 RX ORDER — PHENYLEPHRINE HCL IN 0.9% NACL 1 MG/10 ML
SYRINGE (ML) INTRAVENOUS AS NEEDED
Status: DISCONTINUED | OUTPATIENT
Start: 2024-09-23 | End: 2024-09-23

## 2024-09-23 RX ORDER — FENTANYL CITRATE 50 UG/ML
25 INJECTION, SOLUTION INTRAMUSCULAR; INTRAVENOUS EVERY 5 MIN PRN
Status: DISCONTINUED | OUTPATIENT
Start: 2024-09-23 | End: 2024-09-23 | Stop reason: HOSPADM

## 2024-09-23 RX ORDER — ONDANSETRON HYDROCHLORIDE 2 MG/ML
4 INJECTION, SOLUTION INTRAVENOUS EVERY 4 HOURS PRN
Status: DISCONTINUED | OUTPATIENT
Start: 2024-09-23 | End: 2024-09-27 | Stop reason: HOSPADM

## 2024-09-23 RX ORDER — NALOXONE HYDROCHLORIDE 0.4 MG/ML
0.2 INJECTION, SOLUTION INTRAMUSCULAR; INTRAVENOUS; SUBCUTANEOUS EVERY 5 MIN PRN
Status: DISCONTINUED | OUTPATIENT
Start: 2024-09-23 | End: 2024-09-27 | Stop reason: HOSPADM

## 2024-09-23 RX ORDER — ENOXAPARIN SODIUM 100 MG/ML
40 INJECTION SUBCUTANEOUS EVERY 24 HOURS
Status: DISCONTINUED | OUTPATIENT
Start: 2024-09-23 | End: 2024-09-27 | Stop reason: HOSPADM

## 2024-09-23 RX ORDER — NAPROXEN SODIUM 220 MG/1
81 TABLET, FILM COATED ORAL DAILY
Status: DISCONTINUED | OUTPATIENT
Start: 2024-09-23 | End: 2024-09-27 | Stop reason: HOSPADM

## 2024-09-23 RX ORDER — LOSARTAN POTASSIUM 50 MG/1
50 TABLET ORAL DAILY
Status: DISCONTINUED | OUTPATIENT
Start: 2024-09-23 | End: 2024-09-27 | Stop reason: HOSPADM

## 2024-09-23 RX ORDER — GABAPENTIN 300 MG/1
300 CAPSULE ORAL 3 TIMES DAILY
Status: DISCONTINUED | OUTPATIENT
Start: 2024-09-23 | End: 2024-09-27 | Stop reason: HOSPADM

## 2024-09-23 RX ORDER — SODIUM CHLORIDE, SODIUM LACTATE, POTASSIUM CHLORIDE, CALCIUM CHLORIDE 600; 310; 30; 20 MG/100ML; MG/100ML; MG/100ML; MG/100ML
100 INJECTION, SOLUTION INTRAVENOUS CONTINUOUS
Status: DISCONTINUED | OUTPATIENT
Start: 2024-09-23 | End: 2024-09-23

## 2024-09-23 RX ORDER — IPRATROPIUM BROMIDE 0.5 MG/2.5ML
0.5 SOLUTION RESPIRATORY (INHALATION)
Status: DISCONTINUED | OUTPATIENT
Start: 2024-09-23 | End: 2024-09-23

## 2024-09-23 RX ORDER — LIDOCAINE HYDROCHLORIDE 10 MG/ML
0.1 INJECTION, SOLUTION INFILTRATION; PERINEURAL ONCE
Status: DISCONTINUED | OUTPATIENT
Start: 2024-09-23 | End: 2024-09-23 | Stop reason: HOSPADM

## 2024-09-23 RX ORDER — SODIUM CHLORIDE, SODIUM GLUCONATE, SODIUM ACETATE, POTASSIUM CHLORIDE AND MAGNESIUM CHLORIDE 30; 37; 368; 526; 502 MG/100ML; MG/100ML; MG/100ML; MG/100ML; MG/100ML
INJECTION, SOLUTION INTRAVENOUS CONTINUOUS PRN
Status: DISCONTINUED | OUTPATIENT
Start: 2024-09-23 | End: 2024-09-23

## 2024-09-23 RX ORDER — ISOSORBIDE MONONITRATE 30 MG/1
30 TABLET, EXTENDED RELEASE ORAL DAILY
Status: DISCONTINUED | OUTPATIENT
Start: 2024-09-23 | End: 2024-09-27 | Stop reason: HOSPADM

## 2024-09-23 RX ORDER — SODIUM CHLORIDE 9 MG/ML
40 INJECTION, SOLUTION INTRAVENOUS CONTINUOUS
Status: DISCONTINUED | OUTPATIENT
Start: 2024-09-23 | End: 2024-09-24

## 2024-09-23 RX ORDER — MIDAZOLAM HYDROCHLORIDE 1 MG/ML
INJECTION, SOLUTION INTRAMUSCULAR; INTRAVENOUS AS NEEDED
Status: DISCONTINUED | OUTPATIENT
Start: 2024-09-23 | End: 2024-09-23

## 2024-09-23 RX ORDER — ALBUTEROL SULFATE 0.83 MG/ML
2.5 SOLUTION RESPIRATORY (INHALATION) ONCE AS NEEDED
Status: DISCONTINUED | OUTPATIENT
Start: 2024-09-23 | End: 2024-09-23 | Stop reason: HOSPADM

## 2024-09-23 RX ORDER — METRONIDAZOLE 500 MG/100ML
500 INJECTION, SOLUTION INTRAVENOUS ONCE
Status: COMPLETED | OUTPATIENT
Start: 2024-09-23 | End: 2024-09-23

## 2024-09-23 RX ORDER — FENTANYL CITRATE 50 UG/ML
INJECTION, SOLUTION INTRAMUSCULAR; INTRAVENOUS AS NEEDED
Status: DISCONTINUED | OUTPATIENT
Start: 2024-09-23 | End: 2024-09-23

## 2024-09-23 RX ORDER — ATORVASTATIN CALCIUM 80 MG/1
80 TABLET, FILM COATED ORAL NIGHTLY
Status: DISCONTINUED | OUTPATIENT
Start: 2024-09-23 | End: 2024-09-27 | Stop reason: HOSPADM

## 2024-09-23 RX ORDER — BUDESONIDE 0.25 MG/2ML
0.25 INHALANT ORAL
Status: DISCONTINUED | OUTPATIENT
Start: 2024-09-23 | End: 2024-09-27 | Stop reason: HOSPADM

## 2024-09-23 RX ORDER — PROPOFOL 10 MG/ML
INJECTION, EMULSION INTRAVENOUS AS NEEDED
Status: DISCONTINUED | OUTPATIENT
Start: 2024-09-23 | End: 2024-09-23

## 2024-09-23 RX ORDER — MEPERIDINE HYDROCHLORIDE 50 MG/ML
12.5 INJECTION INTRAMUSCULAR; INTRAVENOUS; SUBCUTANEOUS EVERY 10 MIN PRN
Status: DISCONTINUED | OUTPATIENT
Start: 2024-09-23 | End: 2024-09-23 | Stop reason: HOSPADM

## 2024-09-23 RX ORDER — TRIAMTERENE/HYDROCHLOROTHIAZID 37.5-25 MG
1 TABLET ORAL DAILY
Status: DISCONTINUED | OUTPATIENT
Start: 2024-09-23 | End: 2024-09-27 | Stop reason: HOSPADM

## 2024-09-23 RX ORDER — OXYCODONE HYDROCHLORIDE 5 MG/1
5 TABLET ORAL EVERY 4 HOURS PRN
Status: DISCONTINUED | OUTPATIENT
Start: 2024-09-23 | End: 2024-09-27 | Stop reason: HOSPADM

## 2024-09-23 RX ORDER — HYDROMORPHONE HYDROCHLORIDE 1 MG/ML
INJECTION, SOLUTION INTRAMUSCULAR; INTRAVENOUS; SUBCUTANEOUS AS NEEDED
Status: DISCONTINUED | OUTPATIENT
Start: 2024-09-23 | End: 2024-09-23

## 2024-09-23 RX ORDER — ONDANSETRON HYDROCHLORIDE 2 MG/ML
INJECTION, SOLUTION INTRAVENOUS AS NEEDED
Status: DISCONTINUED | OUTPATIENT
Start: 2024-09-23 | End: 2024-09-23

## 2024-09-23 RX ORDER — OXYCODONE HYDROCHLORIDE 5 MG/1
5 TABLET ORAL EVERY 4 HOURS PRN
Status: DISCONTINUED | OUTPATIENT
Start: 2024-09-23 | End: 2024-09-23 | Stop reason: HOSPADM

## 2024-09-23 RX ORDER — SODIUM CHLORIDE, SODIUM LACTATE, POTASSIUM CHLORIDE, CALCIUM CHLORIDE 600; 310; 30; 20 MG/100ML; MG/100ML; MG/100ML; MG/100ML
100 INJECTION, SOLUTION INTRAVENOUS CONTINUOUS
Status: DISCONTINUED | OUTPATIENT
Start: 2024-09-23 | End: 2024-09-23 | Stop reason: HOSPADM

## 2024-09-23 RX ORDER — FLUTICASONE FUROATE AND VILANTEROL 100; 25 UG/1; UG/1
1 POWDER RESPIRATORY (INHALATION)
Status: DISCONTINUED | OUTPATIENT
Start: 2024-09-23 | End: 2024-09-23 | Stop reason: ALTCHOICE

## 2024-09-23 RX ORDER — METRONIDAZOLE 500 MG/100ML
INJECTION, SOLUTION INTRAVENOUS AS NEEDED
Status: DISCONTINUED | OUTPATIENT
Start: 2024-09-23 | End: 2024-09-23

## 2024-09-23 RX ORDER — ALBUTEROL SULFATE 0.83 MG/ML
2.5 SOLUTION RESPIRATORY (INHALATION) EVERY 6 HOURS PRN
Status: DISCONTINUED | OUTPATIENT
Start: 2024-09-23 | End: 2024-09-27 | Stop reason: HOSPADM

## 2024-09-23 RX ORDER — OXYCODONE HYDROCHLORIDE 10 MG/1
10 TABLET ORAL EVERY 4 HOURS PRN
Status: DISCONTINUED | OUTPATIENT
Start: 2024-09-23 | End: 2024-09-27 | Stop reason: HOSPADM

## 2024-09-23 RX ORDER — ALBUTEROL SULFATE 90 UG/1
2 INHALANT RESPIRATORY (INHALATION) EVERY 6 HOURS PRN
Status: DISCONTINUED | OUTPATIENT
Start: 2024-09-23 | End: 2024-09-23 | Stop reason: ALTCHOICE

## 2024-09-23 RX ORDER — CIPROFLOXACIN 2 MG/ML
400 INJECTION, SOLUTION INTRAVENOUS ONCE
Status: COMPLETED | OUTPATIENT
Start: 2024-09-23 | End: 2024-09-23

## 2024-09-23 RX ORDER — MIDAZOLAM HYDROCHLORIDE 1 MG/ML
1 INJECTION, SOLUTION INTRAMUSCULAR; INTRAVENOUS ONCE AS NEEDED
Status: DISCONTINUED | OUTPATIENT
Start: 2024-09-23 | End: 2024-09-23 | Stop reason: HOSPADM

## 2024-09-23 RX ORDER — BUPIVACAINE HCL/EPINEPHRINE 0.5-1:200K
VIAL (ML) INJECTION AS NEEDED
Status: DISCONTINUED | OUTPATIENT
Start: 2024-09-23 | End: 2024-09-23 | Stop reason: HOSPADM

## 2024-09-23 RX ORDER — LABETALOL HYDROCHLORIDE 5 MG/ML
5 INJECTION, SOLUTION INTRAVENOUS ONCE AS NEEDED
Status: DISCONTINUED | OUTPATIENT
Start: 2024-09-23 | End: 2024-09-23 | Stop reason: HOSPADM

## 2024-09-23 RX ORDER — ACETAMINOPHEN 325 MG/1
650 TABLET ORAL EVERY 4 HOURS PRN
Status: DISCONTINUED | OUTPATIENT
Start: 2024-09-23 | End: 2024-09-27 | Stop reason: HOSPADM

## 2024-09-23 RX ORDER — IPRATROPIUM BROMIDE 0.5 MG/2.5ML
0.5 SOLUTION RESPIRATORY (INHALATION)
Status: DISCONTINUED | OUTPATIENT
Start: 2024-09-24 | End: 2024-09-27 | Stop reason: HOSPADM

## 2024-09-23 RX ORDER — NEOSTIGMINE METHYLSULFATE 1 MG/ML
INJECTION INTRAVENOUS AS NEEDED
Status: DISCONTINUED | OUTPATIENT
Start: 2024-09-23 | End: 2024-09-23

## 2024-09-23 RX ORDER — HYDRALAZINE HYDROCHLORIDE 20 MG/ML
10 INJECTION INTRAMUSCULAR; INTRAVENOUS EVERY 6 HOURS PRN
Status: DISCONTINUED | OUTPATIENT
Start: 2024-09-23 | End: 2024-09-27 | Stop reason: HOSPADM

## 2024-09-23 RX ORDER — ROCURONIUM BROMIDE 50 MG/5 ML
SYRINGE (ML) INTRAVENOUS AS NEEDED
Status: DISCONTINUED | OUTPATIENT
Start: 2024-09-23 | End: 2024-09-23

## 2024-09-23 RX ORDER — LIDOCAINE HYDROCHLORIDE 20 MG/ML
INJECTION, SOLUTION EPIDURAL; INFILTRATION; INTRACAUDAL; PERINEURAL AS NEEDED
Status: DISCONTINUED | OUTPATIENT
Start: 2024-09-23 | End: 2024-09-23

## 2024-09-23 RX ORDER — METOPROLOL SUCCINATE 25 MG/1
25 TABLET, EXTENDED RELEASE ORAL NIGHTLY
Status: DISCONTINUED | OUTPATIENT
Start: 2024-09-23 | End: 2024-09-27 | Stop reason: HOSPADM

## 2024-09-23 RX ORDER — FORMOTEROL FUMARATE DIHYDRATE 20 UG/2ML
20 SOLUTION RESPIRATORY (INHALATION)
Status: DISCONTINUED | OUTPATIENT
Start: 2024-09-23 | End: 2024-09-27 | Stop reason: HOSPADM

## 2024-09-23 RX ORDER — GLYCOPYRROLATE 0.2 MG/ML
INJECTION INTRAMUSCULAR; INTRAVENOUS AS NEEDED
Status: DISCONTINUED | OUTPATIENT
Start: 2024-09-23 | End: 2024-09-23

## 2024-09-23 SDOH — HEALTH STABILITY: MENTAL HEALTH: CURRENT SMOKER: 0

## 2024-09-23 SDOH — SOCIAL STABILITY: SOCIAL INSECURITY: ARE THERE ANY APPARENT SIGNS OF INJURIES/BEHAVIORS THAT COULD BE RELATED TO ABUSE/NEGLECT?: NO

## 2024-09-23 SDOH — SOCIAL STABILITY: SOCIAL INSECURITY: DO YOU FEEL UNSAFE GOING BACK TO THE PLACE WHERE YOU ARE LIVING?: NO

## 2024-09-23 SDOH — SOCIAL STABILITY: SOCIAL INSECURITY: DO YOU FEEL ANYONE HAS EXPLOITED OR TAKEN ADVANTAGE OF YOU FINANCIALLY OR OF YOUR PERSONAL PROPERTY?: NO

## 2024-09-23 SDOH — SOCIAL STABILITY: SOCIAL INSECURITY: ABUSE: ADULT

## 2024-09-23 SDOH — SOCIAL STABILITY: SOCIAL INSECURITY: HAVE YOU HAD ANY THOUGHTS OF HARMING ANYONE ELSE?: NO

## 2024-09-23 SDOH — SOCIAL STABILITY: SOCIAL INSECURITY: ARE YOU OR HAVE YOU BEEN THREATENED OR ABUSED PHYSICALLY, EMOTIONALLY, OR SEXUALLY BY ANYONE?: NO

## 2024-09-23 SDOH — SOCIAL STABILITY: SOCIAL INSECURITY: DOES ANYONE TRY TO KEEP YOU FROM HAVING/CONTACTING OTHER FRIENDS OR DOING THINGS OUTSIDE YOUR HOME?: NO

## 2024-09-23 SDOH — SOCIAL STABILITY: SOCIAL INSECURITY: HAVE YOU HAD THOUGHTS OF HARMING ANYONE ELSE?: NO

## 2024-09-23 SDOH — SOCIAL STABILITY: SOCIAL INSECURITY: HAS ANYONE EVER THREATENED TO HURT YOUR FAMILY OR YOUR PETS?: NO

## 2024-09-23 SDOH — SOCIAL STABILITY: SOCIAL INSECURITY: WERE YOU ABLE TO COMPLETE ALL THE BEHAVIORAL HEALTH SCREENINGS?: YES

## 2024-09-23 ASSESSMENT — PAIN SCALES - GENERAL
PAINLEVEL_OUTOF10: 5 - MODERATE PAIN
PAINLEVEL_OUTOF10: 4
PAINLEVEL_OUTOF10: 3
PAINLEVEL_OUTOF10: 7
PAINLEVEL_OUTOF10: 3
PAINLEVEL_OUTOF10: 3
PAINLEVEL_OUTOF10: 4
PAINLEVEL_OUTOF10: 3
PAINLEVEL_OUTOF10: 2
PAIN_LEVEL: 0
PAINLEVEL_OUTOF10: 0 - NO PAIN

## 2024-09-23 ASSESSMENT — ACTIVITIES OF DAILY LIVING (ADL)
TOILETING: INDEPENDENT
BATHING: INDEPENDENT
LACK_OF_TRANSPORTATION: NO
GROOMING: INDEPENDENT
ADEQUATE_TO_COMPLETE_ADL: YES
PATIENT'S MEMORY ADEQUATE TO SAFELY COMPLETE DAILY ACTIVITIES?: YES
HEARING - RIGHT EAR: FUNCTIONAL
DRESSING YOURSELF: INDEPENDENT
FEEDING YOURSELF: INDEPENDENT
JUDGMENT_ADEQUATE_SAFELY_COMPLETE_DAILY_ACTIVITIES: YES
WALKS IN HOME: INDEPENDENT
HEARING - LEFT EAR: FUNCTIONAL

## 2024-09-23 ASSESSMENT — ENCOUNTER SYMPTOMS
HEMATURIA: 0
DYSURIA: 1
ANAL BLEEDING: 0
WHEEZING: 0
WEAKNESS: 0
NAUSEA: 0
AGITATION: 0
LIGHT-HEADEDNESS: 0
ACTIVITY CHANGE: 0
UNEXPECTED WEIGHT CHANGE: 0
APPETITE CHANGE: 0
ABDOMINAL DISTENTION: 0
SHORTNESS OF BREATH: 0
CHILLS: 0
CHEST TIGHTNESS: 0
FATIGUE: 0
FEVER: 0
DIZZINESS: 0
PALPITATIONS: 0
FREQUENCY: 0
CONSTIPATION: 0
RECTAL PAIN: 0
BLOOD IN STOOL: 0
CHOKING: 0
ABDOMINAL PAIN: 0
COUGH: 0
DIFFICULTY URINATING: 0
VOMITING: 0
DIARRHEA: 0

## 2024-09-23 ASSESSMENT — COGNITIVE AND FUNCTIONAL STATUS - GENERAL
PATIENT BASELINE BEDBOUND: NO
MOBILITY SCORE: 24
MOBILITY SCORE: 24
DAILY ACTIVITIY SCORE: 24
DAILY ACTIVITIY SCORE: 24

## 2024-09-23 ASSESSMENT — PAIN DESCRIPTION - LOCATION: LOCATION: ABDOMEN

## 2024-09-23 ASSESSMENT — LIFESTYLE VARIABLES
AUDIT-C TOTAL SCORE: 0
HOW MANY STANDARD DRINKS CONTAINING ALCOHOL DO YOU HAVE ON A TYPICAL DAY: PATIENT DOES NOT DRINK
HOW OFTEN DO YOU HAVE 6 OR MORE DRINKS ON ONE OCCASION: NEVER
HOW OFTEN DO YOU HAVE A DRINK CONTAINING ALCOHOL: NEVER
AUDIT-C TOTAL SCORE: 0
SKIP TO QUESTIONS 9-10: 1

## 2024-09-23 ASSESSMENT — PAIN - FUNCTIONAL ASSESSMENT
PAIN_FUNCTIONAL_ASSESSMENT: 0-10
PAIN_FUNCTIONAL_ASSESSMENT: UNABLE TO SELF-REPORT
PAIN_FUNCTIONAL_ASSESSMENT: 0-10
PAIN_FUNCTIONAL_ASSESSMENT: 0-10

## 2024-09-23 ASSESSMENT — COLUMBIA-SUICIDE SEVERITY RATING SCALE - C-SSRS
2. HAVE YOU ACTUALLY HAD ANY THOUGHTS OF KILLING YOURSELF?: NO
1. IN THE PAST MONTH, HAVE YOU WISHED YOU WERE DEAD OR WISHED YOU COULD GO TO SLEEP AND NOT WAKE UP?: NO
6. HAVE YOU EVER DONE ANYTHING, STARTED TO DO ANYTHING, OR PREPARED TO DO ANYTHING TO END YOUR LIFE?: NO

## 2024-09-23 ASSESSMENT — PATIENT HEALTH QUESTIONNAIRE - PHQ9
2. FEELING DOWN, DEPRESSED OR HOPELESS: NOT AT ALL
1. LITTLE INTEREST OR PLEASURE IN DOING THINGS: NOT AT ALL
SUM OF ALL RESPONSES TO PHQ9 QUESTIONS 1 & 2: 0

## 2024-09-23 NOTE — OP NOTE
"LAPAROSCOPIC Sigmoid resection, possible open, possible stoma, flexible sigmoidoscopy Operative Note     Date: 2024  OR Location: [facility] OR    Name: Gallo Schmidt \"Georgiana\", : 1960, Age: 64 y.o., MRN: [id number], Sex: female    Diagnosis  Pre-op Diagnosis      * Colovesical fistula [N32.1] Post-op Diagnosis     * Colovesical fistula [N32.1]     * Peritoneal adhesions [K66.0]     Procedures  Laparoscopic lysis of adhesions  Laparoscopic takedown of colovesical fistula  Laparoscopic sigmoidectomy  Laparoscopic mobilization of splenic flexure  29 EEA CRA  Flexible sigmiodoscopy      Surgeons      * Bernabe Martinez - Primary    Resident/Fellow/Other Assistant:  Surgeons and Role:  * No surgeons found with a matching role *    Procedure Summary  Anesthesia: General  ASA: III  Anesthesia Staff: Anesthesiologist: Zuhair Schmidt DO  C-AA: SHER Diaz  Estimated Blood Loss: 20mL  Intra-op Medications:   Administrations occurring from 0730 to 1225 on 24:   Medication Name Total Dose   BUPivacaine-EPINEPHrine (Marcaine w/EPI) 0.5 %-1:200,000 injection 21 mL   ondansetron (Zofran) injection 4 mg 4 mg   HYDROmorphone (Dilaudid) injection 0.5 mg 0.5 mg   oxygen (O2) therapy 252 L              Anesthesia Record               Intraprocedure I/O Totals          Intake    LR bolus 800.00 mL    electrolyte-A (Plasmalyte-A) 700.00 mL    Total Intake 1500 mL       Output    Urine 600 mL    Est. Blood Loss 20 mL    Total Output 620 mL       Net    Net Volume 880 mL          Specimen:   ID Type Source Tests Collected by Time   1 : SIGMOID Tissue COLON - SIGMOID RESECTION SURGICAL PATHOLOGY EXAM Bernabe Martinez MD 2024 0949        Staff:   Circulator: Vesna Grajedaub Person: Denia  Scrub Person: Zuhair         Drains and/or Catheters:   Closed/Suction Drain 1 Right RUQ (Active)   Dressing Status Dry 24 1143   Drainage Appearance Bright red 24 1143       Urethral Catheter Latex 16 Fr. (Active) "   Site Assessment Clean;Skin intact 09/23/24 1143   Collection Container Standard drainage bag 09/23/24 1143   Securement Method Securing device (Describe) 09/23/24 1143       Tourniquet Times:         Implants:     Findings: Midline omental adhesions. CVF identified to left of midline involving proximal/mid sigmoid colon. Leak test negative.     Indications: Georgiana Schmidt is an 64 y.o. female who is having surgery for Colovesical fistula [N32.1].     The patient was seen in the preoperative area. The risks, benefits, complications, treatment options, non-operative alternatives, expected recovery and outcomes were discussed with the patient. The possibilities of reaction to medication, pulmonary aspiration, injury to surrounding structures, bleeding, recurrent infection, the need for additional procedures, failure to diagnose a condition, and creating a complication requiring transfusion or operation were discussed with the patient. The patient concurred with the proposed plan, giving informed consent.  The site of surgery was properly noted/marked if necessary per policy. The patient has been actively warmed in preoperative area. Preoperative antibiotics have been ordered and given within 1 hours of incision. Venous thrombosis prophylaxis have been ordered including bilateral sequential compression devices    Procedure Details: Safety checklist was performed in preoperative area confirming correct patient occurred procedure.  Patient was brought to the operating room.  Sequential compression devices were applied bilateral lower extremities.  Following induction of general anesthesia, the patient was placed in lithotomy position using the yellowfin stirrups.  Bilateral arms were gently tucked.  Careful attention was paid to padding of pressure points.  A 16 Bahraini Olvera catheter was inserted under sterile technique.  Hair of the pubic region was trimmed with an atraumatic hair clipper.  The patient's abdomen was  prepped with ChloraPrep and the patient draped in the usual sterile fashion.  Timeout was performed, once again confirming correct patient occurred procedure.  Perioperative antibiotics were administered.    A vertical periumbilical midline incision was created in the skin using #15 scalpel blade.  Using electrocautery, incision was carried down to the subcutaneous tissues to the level of the fascia which was grasped and sharply divided between Kocher clamps using a #15 scalpel blade.  There were no adhesions at the fascial entry site and there were no injuries incurred to the intra-abdominal viscera.  The midline fascial opening was then extended the length of the skin incision.  The wound protector of the small Alfonzo laparoscopic system was inserted into the abdomen.  The Loaded with the 12 mm port was applied to the wound protector and carbon dioxide pneumoperitoneum established.  The 30 degree laparoscope was inserted into the abdomen.  There were adhesions along the patient's previous lower vertical midline incision between the abdominal wall and the greater omentum.  Under direct laparoscopic vision, 2 additional 5 mm laparoscopic ports were placed with 1 in the right upper quadrant and the second in the right lower quadrant.  Using the LigaSure device, the adhesions between the greater omentum and the anterior abdominal wall were carefully taken down.  There were no injuries to the bowel with adhesiolysis.    The greater omentum, once freed from the anterior abdominal wall, was now retracted into the upper abdomen.  The small bowel was retracted out of the pelvis and placed into the right upper quadrant.  The patient's cecum was noted to be adhesed against the right upper pelvic sidewall.  These adhesions were taken down using the LigaSure device.  No injuries to the cecum were incurred.  The cecum was now able to be retracted out of the pelvis.  The sigmoid colon was noted to be densely adhesed against the  dome of the bladder.  Using LigaSure device, the colovesical fistula was taken down.  The sigmoid colon was now retracted superiorly and anteriorly.  The peritoneal lining over the medial aspect of the sigmoid colon mesentery at the level of the sacral promontory was incised and the LigaSure device.  A medial to lateral mobilization was now performed.  This incisional opening was carried distally along the right side of the mesentery.  A medial to lateral mobilization was successfully performed.  The lateral edge of the sigmoid colon was now mobilized off the left pelvic sidewall.  The left ureter was clearly identified and kept away from injury.  Additional mobilization along the lateral side was performed moving up along the descending colon along the white line of Toldt.  The inferior mesenteric artery was now clearly identified, skeletonized, ligated and then divided using the LigaSure device.  The mesenteric opening was then further divided up towards the edge of the descending colon to a healthy segment of colon proximal to the colovesical fistula.    The rectosigmoid junction was identified.  The mesorectum was divided perpendicularly to this junction using the LigaSure device.  The right lower quadrant 5 mm port was now upsized to a 12 mm port.  The laparoscopic powered 60 mm blue load stapler was introduced to the abdomen and the rectosigmoid junction divided with a single fire of the stapler.  The cap of the Alfonzo system was now removed and carbon dioxide pneumoperitoneum released.  The sigmoid colon was grasped and eviscerated from the abdomen.  The descending colon was now divided using electrocautery.  The sigmoid colon specimen was passed off the table for final pathology.  Decision was made to proceed with a 29 EEA colorectal anastomosis.  The anvil of a 29 EEA stapler was sutured into the colonic lumen using 0 Prolene stitch in pursestring fashion.  The descending colon was now returned to the  abdomen.  The cap of the Alfonzo laparoscopic system was reapplied and carbon dioxide pneumoperitoneum reestablished.  The rectum was now sized using the 25 mm, 28 mm, and 31 mm sizers.  During sizing, there appeared to be an additional short segment of sigmoid colon still present as identified by the tinea which was now placed on distention with sizing.  Decision was made to resect this remaining sigmoid colon.  The rectosigmoid junction was now more clearly identified.  The mesentery was divided perpendicularly using the LigaSure device.  The laparoscopic powered 60 mm blue load stapler was now reintroduced into the abdomen and the upper rectum transected with a single fire.  The cap of the Alfonzo laparoscopic system was removed and the short additional segment of sigmoid colon retrieved from the abdomen.  This was passed off with the previous sigmoid colon specimen.  The Alfonzo laparoscopic system was reassembled and carbon dioxide pneumoperitoneum reestablished.     Assessment of the reach of the descending colon down into the pelvis for creation of a colorectal anastomosis demonstrated need for further mobilization of the left colon.  The left colon was now completely mobilized off the left lateral abdominal wall along the white line of Toldt.  Additional mobilization was performed off the left retroperitoneum.  The splenic flexure was then completely mobilized.  The lesser sac was entered along the distal aspect of the transverse colon by  the greater omentum off the superior border of the transverse colon.  This plane of dissection was then carried towards and around the splenic flexure.  This mobilization now created excellent reach of the descending colon down to the pelvis for creation of a colorectal anastomosis.  Some additional circumferential mobilization of the rectum was performed in the TME plane as well as the left and right lateral aspects.    The 29 EEA stapler was now introduced into  the anus and advanced all the way to the top of the rectal stump staple line.  The pin was deployed.  The anvil and pin were mated and the stapler slowly closed.  Confirmation of 9 twisted orientation of the colonic conduit was performed.  Additionally, there was no adjacent pelvic structures introduced into the planned colorectal anastomotic staple line.  The stapler was now fired, opened, and carefully removed from the anus.  There were 2 complete anastomotic donuts.  The pelvis was filled with normal saline and the anastomosis submerged beneath.  A flexible sigmoidoscopy was performed.  Insufflation of air across the anastomosis demonstrated no evidence of air leak.  The anastomotic staple line was hemostatic.  The distal colon, rectum and anus were not desufflated.  The flexible sigmoidoscope was removed from the anus.    The right lower quadrant 12 mm port site was closed with a figure-of-eight 0 Vicryl tie using the Mike Smith device.  A 15 Nauruan round channel drain was inserted via the right upper quadrant 5 mm port site and placed deep within the pelvis anterior to the colorectal anastomotic staple line.  The greater omentum was now placed between the bladder and the posteriorly located bowel.  There was excellent hemostasis.  The Alfonzo laparoscopic system and all ports were removed from the abdomen.  Carbon dioxide pneumoperitoneum was released.  Preliminary surgical count was performed and confirmed to be correct.  The vertical midline periumbilical fascial opening was closed with multiple figure-of-eight #1 9 looped PDS stitches.  Final surgical count was performed and confirmed to be correct.  Skin was reapproximated using a skin stapler device.  Local anesthetic consisting of half percent Marcaine with epinephrine was injected into the kenyatta-incisional soft tissues.  The abdomen was cleansed and dried.  Dry dressings were applied to the abdominal wall.  Bulb was connected to the drain.  Signout  was performed.  Patient was awakened and taken recovery area in stable condition.    Complications:  None; patient tolerated the procedure well.    Disposition: PACU - hemodynamically stable.  Condition: stable     This procedure was not performed to treat colon cancer through resection      Additional Details: N/A    Attending Attestation: I was present and scrubbed for the entire procedure.    Bernabe Martinez  Phone Number: [phone]

## 2024-09-23 NOTE — ANESTHESIA PROCEDURE NOTES
Airway  Date/Time: 9/23/2024 7:49 AM  Urgency: elective    Airway not difficult    Staffing  Performed: resident   Authorized by: Zuhair Schmidt DO    Performed by: SEHR Diaz  Patient location during procedure: OR    Indications and Patient Condition  Indications for airway management: anesthesia  Spontaneous Ventilation: absent  Sedation level: deep  Preoxygenated: yes  Patient position: sniffing  Mask difficulty assessment: 1 - vent by mask    Final Airway Details  Final airway type: endotracheal airway      Successful airway: ETT  Cuffed: yes   Successful intubation technique: direct laryngoscopy  Facilitating devices/methods: intubating stylet  Endotracheal tube insertion site: oral  Blade: Nabila  Blade size: #3  ETT size (mm): 7.0  Cormack-Lehane Classification: grade I - full view of glottis  Placement verified by: chest auscultation and capnometry   Measured from: lips  ETT to lips (cm): 21  Number of attempts at approach: 1

## 2024-09-23 NOTE — ANESTHESIA PROCEDURE NOTES
Peripheral IV  Date/Time: 9/23/2024 11:33 AM      Placement  Needle size: 18 G  Laterality: left  Location: hand  Attempts: 1

## 2024-09-23 NOTE — ANESTHESIA PREPROCEDURE EVALUATION
"Patient: Gallo Schmidt \"Georgiana\"    Procedure Information       Date/Time: 24    Procedure: LAPAROSCOPIC Sigmoid resection, possible open, possible stoma, flexible sigmoidoscopy    Location: [facility] / [facility]    Surgeons: Bernabe Martinez MD          Vitals:    24 0611   BP: 122/72   Pulse: 91   Resp: 14   Temp: 36.4 °C (97.5 °F)   SpO2: 96%       Past Surgical History:   Procedure Laterality Date    CARDIAC CATHETERIZATION       SECTION, CLASSIC      COLONOSCOPY  2022    REPEAT 5 YRS FOR POLYPS    CT GUIDED ABSCESS FLUID COLLECTION DRAINAGE  2022    CT GUIDED ABSCESS FLUID COLLECTION DRAINAGE 2022 [facility]    HAND SURGERY Left     thumb    OVARIAN CYST REMOVAL       Past Medical History:   Diagnosis Date    Acute exacerbation of chronic bronchitis (Multi) 2023    COPD (chronic obstructive pulmonary disease) (Multi)     Cough 2023    Diverticulitis     GERD (gastroesophageal reflux disease)     Hyperlipidemia     Hypertension     Lung nodule     Pneumonia     Procedure and treatment not carried out because of patient's decision for unspecified reasons 2021    Mammogram declined    Pulmonary hypertension (Multi)     Sleep apnea, obstructive     Wheezing on auscultation 2023       Current Facility-Administered Medications:     metroNIDAZOLE (Flagyl) 500 mg in sodium chloride (iso)  mL, 500 mg, intravenous, Once, 500 mg at 24 0632 **AND** ciprofloxacin (Cipro) 400 mg in dextrose 5%  mL, 400 mg, intravenous, Once, Bernabe Martinez MD    lactated Ringer's infusion, 100 mL/hr, intravenous, Continuous, Bernabe Martinez MD, Last Rate: 100 mL/hr at 24 0642, 100 mL/hr at 24 0642  Prior to Admission medications    Medication Sig Start Date End Date Taking? Authorizing Provider   albuterol 90 mcg/actuation inhaler Inhale 2 puffs every 6 hours if needed for shortness of breath.   Yes Historical " Provider, MD   aspirin 81 mg chewable tablet CHEW AND SWALLOW 1 TABLET BY MOUTH ONCE DAILY 3/18/24  Yes Tanvi Holguin PA-C   atorvastatin (Lipitor) 80 mg tablet Take 1 tablet (80 mg) by mouth once daily.  Patient taking differently: Take 1 tablet (80 mg) by mouth once daily at bedtime. 6/10/24 7/15/25 Yes Tanvi Holguin PA-C   Breztri Aerosphere 160-9-4.8 mcg/actuation HFA aerosol inhaler 2 puffs BID May have 90 day supply. 9/3/24  Yes Obdulio Chakraborty,    chlorhexidine (Peridex) 0.12 % solution 15 milliliter(s) orally once a day for 2 doses 15 ml  the night before surgery and 15 ml morning of surgery - swish for 30 seconds -DO NOT SWALLOW, SPIT OUT 9/9/24  Yes Lisha Jimenez APRN-CNP   cholecalciferol (Vitamin D-3) 25 MCG (1000 UT) tablet Take 1 tablet (25 mcg) by mouth once daily.   Yes Historical Provider, MD   cyanocobalamin (Vitamin B-12) 1,000 mcg tablet Take 1 tablet (1,000 mcg) by mouth once daily. 11/16/23 11/15/24 Yes Tanvi Holguin PA-C   gabapentin (Neurontin) 100 mg capsule Take one capsule by mouth at bedtime starting 3 days before surgery 8/20/24  Yes Bernabe Martinez MD   isosorbide mononitrate ER (Imdur) 30 mg 24 hr tablet Take 1 tablet (30 mg) by mouth once daily. 5/2/24 5/2/25 Yes Josh Navas MD   lactase (Lactaid) 3,000 unit tablet Take 1 tablet (3,000 Units) by mouth once daily as needed.   Yes Historical Provider, MD   losartan (Cozaar) 50 mg tablet Take 1 tablet (50 mg) by mouth once daily. 8/15/24 8/15/25 Yes Kendrick Lopez MD   metoprolol succinate XL (Toprol-XL) 25 mg 24 hr tablet TAKE 1 TABLET (25 MG) BY MOUTH ONCE DAILY.  Patient taking differently: Take 1 tablet (25 mg) by mouth once daily at bedtime. 3/14/24  Yes Tanvi Holguin PA-C   metroNIDAZOLE (Flagyl) 250 mg tablet Take one tablet by mouth at 6:00pm, 7:00pm, 11:00pm on the day prior to surgery 8/20/24  Yes Bernabe Martinez MD   neomycin (Mycifradin) 500 mg tablet Take two tablets (1000mg) by  mouth at 6:00pm, 7:00pm, and 11:00pm on the day prior to surgery 24  Yes Bernabe Martinez MD   omeprazole OTC (PriLOSEC OTC) 20 mg EC tablet Take 1 tablet (20 mg) by mouth once daily as needed.   Yes Historical Provider, MD   potassium chloride (Klor-Con) 20 mEq packet DISSOLVE 1 PACKET (20 MEQ) IN FLUID AND DRINK BY MOUTH ONCE DAILY AS DIRECTED 3/18/24  Yes Tanvi Holguin PA-C   triamterene-hydrochlorothiazid (Maxzide-25) 37.5-25 mg tablet TAKE 1 TABLET BY MOUTH ONCE DAILY. 3/18/24  Yes Tanvi Holguin PA-C   azelastine (Astelin) 137 mcg (0.1 %) nasal spray Administer 2 sprays into each nostril 2 times a day for 15 days. Use in each nostril as directed  Patient not taking: Reported on 2024 2/3/24 2/18/24  Presley Frederick PA-C     Allergies   Allergen Reactions    Lisinopril Cough    Sulfa (Sulfonamide Antibiotics) Rash     Social History     Tobacco Use    Smoking status: Former     Current packs/day: 0.00     Average packs/day: 1.5 packs/day for 33.0 years (49.5 ttl pk-yrs)     Types: Cigarettes     Start date: 1978     Quit date: 2011     Years since quittin.7     Passive exposure: Current    Smokeless tobacco: Never   Substance Use Topics    Alcohol use: Never         Chemistry    Lab Results   Component Value Date/Time     2024 0805    K 4.8 2024 0805     2024 0805    CO2 30 2024 0805    BUN 16 2024 0805    CREATININE 0.87 2024 0805    Lab Results   Component Value Date/Time    CALCIUM 9.7 2024 0805    ALKPHOS 77 2024 1457    AST 25 2024 1457    ALT 22 2024 1457    BILITOT 0.5 2024 1457          Lab Results   Component Value Date/Time    WBC 7.4 2024 1457    HGB 12.0 2024 1457    HCT 37.5 2024 1457     2024 1457     Lab Results   Component Value Date/Time    PROTIME 12.2 2024    INR 1.1 2024     Encounter Date: 24   ECG 12 lead   Result Value     Ventricular Rate 78    Atrial Rate 78    NH Interval 158    QRS Duration 90    QT Interval 390    QTC Calculation(Bazett) 444    P Axis 56    R Axis -34    T Axis 51    QRS Count 12    Q Onset 216    P Onset 137    P Offset 185    T Offset 411    QTC Fredericia 425    Narrative    Normal sinus rhythm  Left axis deviation  Possible Anterior infarct (cited on or before 17-MAY-2024)  Abnormal ECG  When compared with ECG of 17-MAY-2024 02:08,  Nonspecific T wave abnormality no longer evident in Inferior leads  Nonspecific T wave abnormality no longer evident in Anterolateral leads  See ED provider note for full interpretation and clinical correlation  Confirmed by Tanya Christie ([id number]) on 7/15/2024 11:44:31 AM        Relevant Problems   Cardiac   (+) Hypercholesteremia   (+) Hypertension      Pulmonary   (+) COPD (chronic obstructive pulmonary disease) (Multi)   (+) Obstructive sleep apnea, adult   (+) SOB (shortness of breath) on exertion      GI   (+) GERD (gastroesophageal reflux disease)      Musculoskeletal   (+) Chronic bilateral low back pain without sciatica   (+) 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 : Y  Date of Last Liquid: 09/23/24  Time of Last Liquid: 0415  Date of Last Solid: 09/21/24  Time of Last Solid: 1900  Last Intake Type: Clear fluids  Time of Last Void: 0605         Physical Exam    Airway  Mallampati: II  TM distance: >3 FB     Cardiovascular - normal exam  Rhythm: regular  Rate: normal     Dental - normal exam     Pulmonary - normal exam     Abdominal - normal exam  Abdomen: soft             Anesthesia Plan    History of general  anesthesia?: yes  History of complications of general anesthesia?: no    ASA 3     general     The patient is not a current smoker.  Patient was previously instructed to abstain from smoking on day of procedure.  Patient did not smoke on day of procedure.  Education provided regarding risk of obstructive sleep apnea.  intravenous induction   Postoperative administration of opioids is intended.  Anesthetic plan and risks discussed with patient.  Use of blood products discussed with patient who.    Plan discussed with CAA and CRNA.

## 2024-09-23 NOTE — H&P
"History Of Present Illness  Gallo Schmidt \"Georgiana\" is a 64 y.o. female presenting with colovesical fistula.  Last seen in office 2024.  No new complaints.     Past Medical History  Past Medical History:   Diagnosis Date    Acute exacerbation of chronic bronchitis (Multi) 2023    COPD (chronic obstructive pulmonary disease) (Multi)     Cough 2023    Diverticulitis     GERD (gastroesophageal reflux disease)     Hyperlipidemia     Hypertension     Lung nodule     Pneumonia     Procedure and treatment not carried out because of patient's decision for unspecified reasons 2021    Mammogram declined    Pulmonary hypertension (Multi)     Sleep apnea, obstructive     Wheezing on auscultation 2023       Surgical History  Past Surgical History:   Procedure Laterality Date    CARDIAC CATHETERIZATION       SECTION, CLASSIC      COLONOSCOPY  2022    REPEAT 5 YRS FOR POLYPS    CT GUIDED ABSCESS FLUID COLLECTION DRAINAGE  2022    CT GUIDED ABSCESS FLUID COLLECTION DRAINAGE 2022 [facility]    HAND SURGERY Left     thumb    OVARIAN CYST REMOVAL          Social History  She reports that she quit smoking about 13 years ago. Her smoking use included cigarettes. She started smoking about 46 years ago. She has a 49.5 pack-year smoking history. She has been exposed to tobacco smoke. She has never used smokeless tobacco. She reports that she does not drink alcohol and does not use drugs.    Family History  Family History   Problem Relation Name Age of Onset    Hypertension Mother Anita márquez     Stroke Father Rory márquez     COPD Father Rory márquez     Hypertension Father Rory márquez     Other (PERIPHERAL ARTERIAL DISEASE) Father Rory márquez     Thyroid disease Father Rory márquez     Heart attack Brother      Stomach cancer Maternal Grandmother      Isabela's disease Paternal Grandmother          Allergies  Lisinopril and Sulfa (sulfonamide antibiotics)    Review " of Systems   Constitutional:  Negative for activity change, appetite change, chills, fatigue, fever and unexpected weight change.   Respiratory:  Negative for cough, choking, chest tightness, shortness of breath and wheezing.    Cardiovascular:  Negative for chest pain, palpitations and leg swelling.   Gastrointestinal:  Negative for abdominal distention, abdominal pain, anal bleeding, blood in stool, constipation, diarrhea, nausea, rectal pain and vomiting.   Genitourinary:  Positive for dysuria. Negative for difficulty urinating, frequency and hematuria.        Intermittent pneumaturia.  No recent fecaluria.   Neurological:  Negative for dizziness, weakness and light-headedness.   Psychiatric/Behavioral:  Negative for agitation.         Physical Exam  Constitutional:       General: She is not in acute distress.     Appearance: Normal appearance. She is not ill-appearing.   HENT:      Head: Normocephalic.      Mouth/Throat:      Mouth: Mucous membranes are moist.   Eyes:      Extraocular Movements: Extraocular movements intact.      Pupils: Pupils are equal, round, and reactive to light.   Cardiovascular:      Rate and Rhythm: Normal rate and regular rhythm.      Pulses: Normal pulses.      Heart sounds: Murmur heard.   Pulmonary:      Effort: No respiratory distress.      Breath sounds: Wheezing present. No rhonchi or rales.      Comments: Diminished breath sounds in base bilaterally.  Mild end-expiratory wheezing bilaterally.  Chest:      Chest wall: No tenderness.   Abdominal:      General: There is no distension.      Palpations: There is no mass.      Tenderness: There is no abdominal tenderness. There is no guarding or rebound.      Hernia: No hernia is present.   Genitourinary:     Rectum: Normal.   Musculoskeletal:         General: No swelling or deformity.      Cervical back: No rigidity.      Right lower leg: No edema.      Left lower leg: No edema.   Skin:     General: Skin is warm.      Coloration: Skin  "is not jaundiced or pale.   Neurological:      Mental Status: She is alert.          Last Recorded Vitals  Blood pressure 122/72, pulse 91, temperature 36.4 °C (97.5 °F), resp. rate 14, height 1.499 m (4' 11.02\"), weight 70.2 kg (154 lb 12.2 oz), SpO2 96%.    Relevant Results        Results for orders placed or performed during the hospital encounter of 09/23/24 (from the past 24 hour(s))   Protime-INR   Result Value Ref Range    Protime 12.2 9.8 - 12.8 seconds    INR 1.1 0.9 - 1.1          Assessment/Plan   Assessment & Plan  Colovesical fistula      For laparoscopic possible open sigmoidectomy, colorectal anastomosis, flexible sigmoidoscopy, possible stoma       I spent 10 minutes in the professional and overall care of this patient.      Bernabe Martinez MD    "

## 2024-09-23 NOTE — CARE PLAN
The patient's goals for the shift include      The clinical goals for the shift include pain control      Problem: Skin  Goal: Decreased wound size/increased tissue granulation at next dressing change  Flowsheets (Taken 9/23/2024 1431)  Decreased wound size/increased tissue granulation at next dressing change:   Promote sleep for wound healing   Utilize specialty bed per algorithm

## 2024-09-23 NOTE — ANESTHESIA POSTPROCEDURE EVALUATION
"Patient: Gallo Schmidt \"Georgiana\"    Procedure Summary       Date: 09/23/24 Room / Location: [facility] / [facility]    Anesthesia Start: 0737 Anesthesia Stop: 1148    Procedure: LAPAROSCOPIC Sigmoid resection, possible open, possible stoma, flexible sigmoidoscopy Diagnosis:       Colovesical fistula      (Colovesical fistula [N32.1])    Surgeons: Bernabe Martinez MD Responsible Provider: Zuhair Schmidt DO    Anesthesia Type: general ASA Status: 3            Anesthesia Type: general    Vitals Value Taken Time   /71 09/23/24 1200   Temp 36.2 °C (97.2 °F) 09/23/24 1143   Pulse 58 09/23/24 1215   Resp 9 09/23/24 1215   SpO2 98 % 09/23/24 1215   Vitals shown include unfiled device data.    Anesthesia Post Evaluation    Patient location during evaluation: PACU  Patient participation: complete - patient participated  Level of consciousness: awake  Pain score: 0  Pain management: adequate  Multimodal analgesia pain management approach  Airway patency: patent  Two or more strategies used to mitigate risk of obstructive sleep apnea  Cardiovascular status: acceptable  Respiratory status: acceptable  Hydration status: acceptable  Postoperative Nausea and Vomiting: none        No notable events documented.    "

## 2024-09-23 NOTE — BRIEF OP NOTE
"Date: 2024  OR Location: [facility]    Name: Gallo Schmidt \"Georgiana\", : 1960, Age: 64 y.o., MRN: [id number], Sex: female    Diagnosis  Pre-op Diagnosis      * Colovesical fistula [N32.1] Post-op Diagnosis     * Colovesical fistula [N32.1]     * Peritoneal adhesions [K66.0]     Procedures  Laparoscopic lysis of adhesions  Laparoscopic takedown of colovesical fistula  Laparoscopic sigmoidectomy  Laparoscopic mobilization of splenic flexure  29 EEA CRA  Flexible sigmiodoscopy      Surgeons      * Bernabe Martinez - Primary    Resident/Fellow/Other Assistant:  Surgeons and Role:  * No surgeons found with a matching role *    Procedure Summary  Anesthesia: Anesthesia type not filed in the log.  ASA: III  Anesthesia Staff: Anesthesiologist: Zuhair Schmidt DO  C-AA: SHER Diaz  Estimated Blood Loss: 20 mL  Intra-op Medications:   Administrations occurring from 0730 to 1225 on 24:   Medication Name Total Dose   BUPivacaine-EPINEPHrine (Marcaine w/EPI) 0.5 %-1:200,000 injection 21 mL              Anesthesia Record               Intraprocedure I/O Totals          Intake    LR bolus 800.00 mL    electrolyte-A (Plasmalyte-A) 700.00 mL    Total Intake 1500 mL       Output    Urine 600 mL    Est. Blood Loss 20 mL    Total Output 620 mL       Net    Net Volume 880 mL          Specimen:   ID Type Source Tests Collected by Time   1 : SIGMOID Tissue COLON - SIGMOID RESECTION SURGICAL PATHOLOGY EXAM Bernabe Martinez MD 2024 0949        Staff:   Circulator: Vesna  Scrub Person: Denia  Scrub Person: Zuhair        Findings: Midline omental adhesions. CVF identified to left of midline involving proximal/mid sigmoid colon. Leak test negative.    Complications:  None; patient tolerated the procedure well.     Disposition: PACU - hemodynamically stable.  Condition: stable  Specimens Collected:   ID Type Source Tests Collected by Time   1 : SIGMOID Tissue COLON - SIGMOID RESECTION SURGICAL PATHOLOGY EXAM Bernabe" ROX Martinez MD 9/23/2024 0949     Attending Attestation:     Bernabe Martinez  Phone Number: [phone]

## 2024-09-24 LAB
ANION GAP SERPL CALC-SCNC: 10 MMOL/L (ref 10–20)
BUN SERPL-MCNC: 8 MG/DL (ref 6–23)
CALCIUM SERPL-MCNC: 8.6 MG/DL (ref 8.6–10.3)
CHLORIDE SERPL-SCNC: 102 MMOL/L (ref 98–107)
CO2 SERPL-SCNC: 30 MMOL/L (ref 21–32)
CREAT SERPL-MCNC: 0.71 MG/DL (ref 0.5–1.05)
EGFRCR SERPLBLD CKD-EPI 2021: >90 ML/MIN/1.73M*2
ERYTHROCYTE [DISTWIDTH] IN BLOOD BY AUTOMATED COUNT: 14.5 % (ref 11.5–14.5)
GLUCOSE SERPL-MCNC: 91 MG/DL (ref 74–99)
HCT VFR BLD AUTO: 36.2 % (ref 36–46)
HGB BLD-MCNC: 11.2 G/DL (ref 12–16)
MAGNESIUM SERPL-MCNC: 1.8 MG/DL (ref 1.6–2.4)
MCH RBC QN AUTO: 29.6 PG (ref 26–34)
MCHC RBC AUTO-ENTMCNC: 30.9 G/DL (ref 32–36)
MCV RBC AUTO: 96 FL (ref 80–100)
NRBC BLD-RTO: 0 /100 WBCS (ref 0–0)
PLATELET # BLD AUTO: 252 X10*3/UL (ref 150–450)
POTASSIUM SERPL-SCNC: 3.4 MMOL/L (ref 3.5–5.3)
RBC # BLD AUTO: 3.78 X10*6/UL (ref 4–5.2)
SODIUM SERPL-SCNC: 139 MMOL/L (ref 136–145)
WBC # BLD AUTO: 7.9 X10*3/UL (ref 4.4–11.3)

## 2024-09-24 PROCEDURE — 2500000001 HC RX 250 WO HCPCS SELF ADMINISTERED DRUGS (ALT 637 FOR MEDICARE OP): Performed by: STUDENT IN AN ORGANIZED HEALTH CARE EDUCATION/TRAINING PROGRAM

## 2024-09-24 PROCEDURE — 94640 AIRWAY INHALATION TREATMENT: CPT

## 2024-09-24 PROCEDURE — 85027 COMPLETE CBC AUTOMATED: CPT | Performed by: STUDENT IN AN ORGANIZED HEALTH CARE EDUCATION/TRAINING PROGRAM

## 2024-09-24 PROCEDURE — 2500000005 HC RX 250 GENERAL PHARMACY W/O HCPCS: Performed by: STUDENT IN AN ORGANIZED HEALTH CARE EDUCATION/TRAINING PROGRAM

## 2024-09-24 PROCEDURE — 1100000001 HC PRIVATE ROOM DAILY

## 2024-09-24 PROCEDURE — 2500000004 HC RX 250 GENERAL PHARMACY W/ HCPCS (ALT 636 FOR OP/ED): Performed by: STUDENT IN AN ORGANIZED HEALTH CARE EDUCATION/TRAINING PROGRAM

## 2024-09-24 PROCEDURE — 2500000002 HC RX 250 W HCPCS SELF ADMINISTERED DRUGS (ALT 637 FOR MEDICARE OP, ALT 636 FOR OP/ED): Performed by: STUDENT IN AN ORGANIZED HEALTH CARE EDUCATION/TRAINING PROGRAM

## 2024-09-24 PROCEDURE — 99232 SBSQ HOSP IP/OBS MODERATE 35: CPT | Performed by: NURSE PRACTITIONER

## 2024-09-24 PROCEDURE — 83735 ASSAY OF MAGNESIUM: CPT | Performed by: STUDENT IN AN ORGANIZED HEALTH CARE EDUCATION/TRAINING PROGRAM

## 2024-09-24 PROCEDURE — 2500000002 HC RX 250 W HCPCS SELF ADMINISTERED DRUGS (ALT 637 FOR MEDICARE OP, ALT 636 FOR OP/ED): Performed by: NURSE PRACTITIONER

## 2024-09-24 PROCEDURE — 80048 BASIC METABOLIC PNL TOTAL CA: CPT | Performed by: STUDENT IN AN ORGANIZED HEALTH CARE EDUCATION/TRAINING PROGRAM

## 2024-09-24 PROCEDURE — 36415 COLL VENOUS BLD VENIPUNCTURE: CPT | Performed by: STUDENT IN AN ORGANIZED HEALTH CARE EDUCATION/TRAINING PROGRAM

## 2024-09-24 RX ORDER — POTASSIUM CHLORIDE 20 MEQ/1
40 TABLET, EXTENDED RELEASE ORAL ONCE
Status: COMPLETED | OUTPATIENT
Start: 2024-09-24 | End: 2024-09-24

## 2024-09-24 ASSESSMENT — COGNITIVE AND FUNCTIONAL STATUS - GENERAL
MOVING TO AND FROM BED TO CHAIR: A LITTLE
WALKING IN HOSPITAL ROOM: A LITTLE
STANDING UP FROM CHAIR USING ARMS: A LITTLE
PERSONAL GROOMING: A LITTLE
DRESSING REGULAR UPPER BODY CLOTHING: A LITTLE
HELP NEEDED FOR BATHING: A LITTLE
TURNING FROM BACK TO SIDE WHILE IN FLAT BAD: A LITTLE
DRESSING REGULAR LOWER BODY CLOTHING: A LITTLE
TOILETING: A LITTLE
DAILY ACTIVITIY SCORE: 19
MOBILITY SCORE: 19
CLIMB 3 TO 5 STEPS WITH RAILING: A LITTLE

## 2024-09-24 ASSESSMENT — PAIN SCALES - GENERAL
PAINLEVEL_OUTOF10: 7
PAINLEVEL_OUTOF10: 7
PAINLEVEL_OUTOF10: 0 - NO PAIN
PAINLEVEL_OUTOF10: 5 - MODERATE PAIN
PAINLEVEL_OUTOF10: 4

## 2024-09-24 ASSESSMENT — PAIN - FUNCTIONAL ASSESSMENT
PAIN_FUNCTIONAL_ASSESSMENT: 0-10

## 2024-09-24 NOTE — PROGRESS NOTES
09/24/24 0957   Discharge Planning   Living Arrangements Spouse/significant other   Support Systems Spouse/significant other   Type of Residence Private residence   Home or Post Acute Services Other (Comment)   Expected Discharge Disposition Home   Does the patient need discharge transport arranged? No     Met with patient at bedside. Admitted for colovesical fistula. Pt lives with spouse and was independent PTA with no HHC. Pt has a CPAP at home. PCP is Tanvi Holguin. Pt feels she is able to manage her health and understands her medications. Was able to drive and obtain meds. Pt plans to return home with no new discharge needs. Family will provide transport.

## 2024-09-24 NOTE — PROGRESS NOTES
Gallo Schmidt 64 y.o. female    Subjective  Patient seen and examined this morning.  Denies nausea and vomiting.  No fever or chills.  Pain controlled.  Olvera catheter with clear yellow urine. Reports some belching, no flatus or BM as of yet.  Up ambulating. Denies CP and SOB. No acute events overnight.    Objective  PHYSICAL EXAM:  Physical Exam  Vitals reviewed.   Constitutional:       General: She is awake.      Appearance: Normal appearance.   Cardiovascular:      Rate and Rhythm: Normal rate and regular rhythm.      Pulses: Normal pulses.      Heart sounds: Normal heart sounds.   Pulmonary:      Effort: Pulmonary effort is normal.      Breath sounds: Normal air entry. Decreased breath sounds present.   Abdominal:      General: Abdomen is flat. There is no distension.      Palpations: Abdomen is soft.      Tenderness: There is abdominal tenderness. There is no guarding or rebound.      Comments: Soft, non-distended.  Incisions well approximated with staples, C/D/I.  RUQ RENETTA with SS drainage. Appropriately TTP   Genitourinary:     Comments: Olvera catheter with clear yellow urine.   Musculoskeletal:         General: Normal range of motion.      Cervical back: Normal range of motion.   Skin:     General: Skin is warm and dry.   Neurological:      General: No focal deficit present.      Mental Status: She is alert and oriented to person, place, and time.   Psychiatric:         Behavior: Behavior is cooperative.       Vital signs in last 24 hours:  Vitals:    09/24/24 0730   BP:    Pulse:    Resp:    Temp:    SpO2: (!) 77%         Intake/Output this shift:    Intake/Output Summary (Last 24 hours) at 9/24/2024 0824  Last data filed at 9/24/2024 0333  Gross per 24 hour   Intake 2387.01 ml   Output 1965 ml   Net 422.01 ml        Allergies:  Allergies   Allergen Reactions    Lisinopril Cough    Sulfa (Sulfonamide Antibiotics) Rash        Medications:  Scheduled medications  aspirin, 81 mg, oral,  Daily  atorvastatin, 80 mg, oral, Nightly  budesonide, 0.25 mg, nebulization, BID  enoxaparin, 40 mg, subcutaneous, q24h  formoterol, 20 mcg, nebulization, BID  gabapentin, 300 mg, oral, TID  ipratropium, 0.5 mg, nebulization, TID  isosorbide mononitrate ER, 30 mg, oral, Daily  [Held by provider] losartan, 50 mg, oral, Daily  metoprolol succinate XL, 25 mg, oral, Nightly  pantoprazole, 40 mg, oral, Daily before breakfast  [Held by provider] triamterene-hydrochlorothiazid, 1 tablet, oral, Daily      Continuous medications  sodium chloride 0.9%, 40 mL/hr, Last Rate: 40 mL/hr (09/23/24 1512)      PRN medications  PRN medications: acetaminophen, albuterol, hydrALAZINE, naloxone, ondansetron, oxyCODONE, oxyCODONE       Labs:  Results for orders placed or performed during the hospital encounter of 09/23/24 (from the past 24 hour(s))   Basic Metabolic Panel   Result Value Ref Range    Glucose 91 74 - 99 mg/dL    Sodium 139 136 - 145 mmol/L    Potassium 3.4 (L) 3.5 - 5.3 mmol/L    Chloride 102 98 - 107 mmol/L    Bicarbonate 30 21 - 32 mmol/L    Anion Gap 10 10 - 20 mmol/L    Urea Nitrogen 8 6 - 23 mg/dL    Creatinine 0.71 0.50 - 1.05 mg/dL    eGFR >90 >60 mL/min/1.73m*2    Calcium 8.6 8.6 - 10.3 mg/dL   CBC   Result Value Ref Range    WBC 7.9 4.4 - 11.3 x10*3/uL    nRBC 0.0 0.0 - 0.0 /100 WBCs    RBC 3.78 (L) 4.00 - 5.20 x10*6/uL    Hemoglobin 11.2 (L) 12.0 - 16.0 g/dL    Hematocrit 36.2 36.0 - 46.0 %    MCV 96 80 - 100 fL    MCH 29.6 26.0 - 34.0 pg    MCHC 30.9 (L) 32.0 - 36.0 g/dL    RDW 14.5 11.5 - 14.5 %    Platelets 252 150 - 450 x10*3/uL   Magnesium   Result Value Ref Range    Magnesium 1.80 1.60 - 2.40 mg/dL        Imaging:  No results found.     Plan  Colovesical fistula     POD#1:  S/p  Laparoscopic lysis of adhesions  Laparoscopic takedown of colovesical fistula  Laparoscopic sigmoidectomy  Laparoscopic mobilization of splenic flexure  29 EEA CRA  Flexible sigmiodoscopy    - surgery as above  - avss  - Diet:  advance to FLD  - Multimodal pain control  - Nausea: antiemetics PRN  - Encourage OOB and IS  - Lovenox for DVT prophylaxis  - Maintain RENETAT drain  - Maintain srinivasan catheter.  Will need cystogram to r/o bladder leak on POD5  - DC IVF 9/24    #COPD  - Encourage IS  - Wean O2 as tolerated  - Breathing tx ordered     #HLD  - Home atorvastatin resumed    #HTN  - Home Imdur and metoprolol succinate XL resumed     #GERD  - Home PPI resumed     #Hypokalemia  - replete       - Daily labs    Plan of care discussed and patient seen with Dr. Juan Yo, APRN-CNP    I spent 25 minutes in the professional and overall care of this patient.

## 2024-09-24 NOTE — CARE PLAN
The patient's goals for the shift include      The clinical goals for the shift include Pt will have minimal pain this shift and receive adequate rest.      Problem: Skin  Goal: Decreased wound size/increased tissue granulation at next dressing change  Flowsheets (Taken 9/24/2024 1052)  Decreased wound size/increased tissue granulation at next dressing change:   Promote sleep for wound healing   Utilize specialty bed per algorithm

## 2024-09-24 NOTE — CARE PLAN
Problem: Pain - Adult  Goal: Verbalizes/displays adequate comfort level or baseline comfort level  Outcome: Progressing     Problem: Safety - Adult  Goal: Free from fall injury  Outcome: Progressing     Problem: Discharge Planning  Goal: Discharge to home or other facility with appropriate resources  Outcome: Progressing     Problem: Chronic Conditions and Co-morbidities  Goal: Patient's chronic conditions and co-morbidity symptoms are monitored and maintained or improved  Outcome: Progressing     Problem: Skin  Goal: Decreased wound size/increased tissue granulation at next dressing change  Outcome: Progressing  Goal: Participates in plan/prevention/treatment measures  Outcome: Progressing  Goal: Prevent/manage excess moisture  Outcome: Progressing  Goal: Prevent/minimize sheer/friction injuries  Outcome: Progressing  Goal: Promote/optimize nutrition  Outcome: Progressing  Goal: Promote skin healing  Outcome: Progressing     Problem: Fall/Injury  Goal: Not fall by end of shift  Outcome: Progressing  Goal: Be free from injury by end of the shift  Outcome: Progressing  Goal: Verbalize understanding of personal risk factors for fall in the hospital  Outcome: Progressing  Goal: Verbalize understanding of risk factor reduction measures to prevent injury from fall in the home  Outcome: Progressing  Goal: Use assistive devices by end of the shift  Outcome: Progressing  Goal: Pace activities to prevent fatigue by end of the shift  Outcome: Progressing     Problem: Pain  Goal: Takes deep breaths with improved pain control throughout the shift  Outcome: Progressing  Goal: Turns in bed with improved pain control throughout the shift  Outcome: Progressing  Goal: Walks with improved pain control throughout the shift  Outcome: Progressing  Goal: Performs ADL's with improved pain control throughout shift  Outcome: Progressing  Goal: Participates in PT with improved pain control throughout the shift  Outcome: Progressing  Goal:  Free from opioid side effects throughout the shift  Outcome: Progressing  Goal: Free from acute confusion related to pain meds throughout the shift  Outcome: Progressing   The patient's goals for the shift include      The clinical goals for the shift include Pt will have minimal pain this shift and receive adequate rest.    Over the shift, the patient did receive rest.

## 2024-09-24 NOTE — PROGRESS NOTES
Spiritual Care Visit    Clinical Encounter Type  Visited With: Patient  Routine Visit: Introduction                             Advance Directives  Advance Directives Reviewed Date: 09/24/24  Advance Directives Reviewed with: Patient  Advance Directives Completed Date: 09/24/24  Advance Directives Type: Living Will, Power of  for Healthcare              Taxonomy  Intended Effects: Build relationship of care and support, Demonstrate caring and concern, Preserve dignity and respect  Methods: Offer support  Interventions: Ask guided questions, Active listening, Assist someone with Advance Directives, Share words of hope and inspiration

## 2024-09-25 LAB
ALBUMIN SERPL BCP-MCNC: 3 G/DL (ref 3.4–5)
ALP SERPL-CCNC: 74 U/L (ref 33–136)
ALT SERPL W P-5'-P-CCNC: 19 U/L (ref 7–45)
ANION GAP SERPL CALC-SCNC: 8 MMOL/L (ref 10–20)
AST SERPL W P-5'-P-CCNC: 17 U/L (ref 9–39)
BILIRUB SERPL-MCNC: 0.5 MG/DL (ref 0–1.2)
BUN SERPL-MCNC: 13 MG/DL (ref 6–23)
CALCIUM SERPL-MCNC: 8.8 MG/DL (ref 8.6–10.3)
CHLORIDE SERPL-SCNC: 104 MMOL/L (ref 98–107)
CO2 SERPL-SCNC: 32 MMOL/L (ref 21–32)
CREAT SERPL-MCNC: 0.79 MG/DL (ref 0.5–1.05)
EGFRCR SERPLBLD CKD-EPI 2021: 85 ML/MIN/1.73M*2
ERYTHROCYTE [DISTWIDTH] IN BLOOD BY AUTOMATED COUNT: 14.6 % (ref 11.5–14.5)
GLUCOSE SERPL-MCNC: 91 MG/DL (ref 74–99)
HCT VFR BLD AUTO: 32.5 % (ref 36–46)
HGB BLD-MCNC: 10.2 G/DL (ref 12–16)
MAGNESIUM SERPL-MCNC: 1.78 MG/DL (ref 1.6–2.4)
MCH RBC QN AUTO: 30.6 PG (ref 26–34)
MCHC RBC AUTO-ENTMCNC: 31.4 G/DL (ref 32–36)
MCV RBC AUTO: 98 FL (ref 80–100)
NRBC BLD-RTO: 0 /100 WBCS (ref 0–0)
PHOSPHATE SERPL-MCNC: 2.3 MG/DL (ref 2.5–4.9)
PLATELET # BLD AUTO: 206 X10*3/UL (ref 150–450)
POTASSIUM SERPL-SCNC: 3.7 MMOL/L (ref 3.5–5.3)
PROT SERPL-MCNC: 5.3 G/DL (ref 6.4–8.2)
RBC # BLD AUTO: 3.33 X10*6/UL (ref 4–5.2)
SODIUM SERPL-SCNC: 140 MMOL/L (ref 136–145)
WBC # BLD AUTO: 4.9 X10*3/UL (ref 4.4–11.3)

## 2024-09-25 PROCEDURE — 99024 POSTOP FOLLOW-UP VISIT: CPT | Performed by: STUDENT IN AN ORGANIZED HEALTH CARE EDUCATION/TRAINING PROGRAM

## 2024-09-25 PROCEDURE — 2500000001 HC RX 250 WO HCPCS SELF ADMINISTERED DRUGS (ALT 637 FOR MEDICARE OP): Performed by: STUDENT IN AN ORGANIZED HEALTH CARE EDUCATION/TRAINING PROGRAM

## 2024-09-25 PROCEDURE — 80053 COMPREHEN METABOLIC PANEL: CPT | Performed by: NURSE PRACTITIONER

## 2024-09-25 PROCEDURE — 1100000001 HC PRIVATE ROOM DAILY

## 2024-09-25 PROCEDURE — 85027 COMPLETE CBC AUTOMATED: CPT | Performed by: NURSE PRACTITIONER

## 2024-09-25 PROCEDURE — 2500000004 HC RX 250 GENERAL PHARMACY W/ HCPCS (ALT 636 FOR OP/ED): Performed by: STUDENT IN AN ORGANIZED HEALTH CARE EDUCATION/TRAINING PROGRAM

## 2024-09-25 PROCEDURE — 94640 AIRWAY INHALATION TREATMENT: CPT

## 2024-09-25 PROCEDURE — 84100 ASSAY OF PHOSPHORUS: CPT | Performed by: NURSE PRACTITIONER

## 2024-09-25 PROCEDURE — 2500000005 HC RX 250 GENERAL PHARMACY W/O HCPCS: Performed by: STUDENT IN AN ORGANIZED HEALTH CARE EDUCATION/TRAINING PROGRAM

## 2024-09-25 PROCEDURE — 83735 ASSAY OF MAGNESIUM: CPT | Performed by: NURSE PRACTITIONER

## 2024-09-25 PROCEDURE — 2500000002 HC RX 250 W HCPCS SELF ADMINISTERED DRUGS (ALT 637 FOR MEDICARE OP, ALT 636 FOR OP/ED): Performed by: STUDENT IN AN ORGANIZED HEALTH CARE EDUCATION/TRAINING PROGRAM

## 2024-09-25 PROCEDURE — 36415 COLL VENOUS BLD VENIPUNCTURE: CPT | Performed by: NURSE PRACTITIONER

## 2024-09-25 RX ORDER — SODIUM,POTASSIUM PHOSPHATES 280-250MG
1 POWDER IN PACKET (EA) ORAL 4 TIMES DAILY
Status: DISCONTINUED | OUTPATIENT
Start: 2024-09-25 | End: 2024-09-25 | Stop reason: ALTCHOICE

## 2024-09-25 ASSESSMENT — COGNITIVE AND FUNCTIONAL STATUS - GENERAL
TOILETING: A LITTLE
MOBILITY SCORE: 24
EATING MEALS: A LITTLE
DAILY ACTIVITIY SCORE: 22

## 2024-09-25 ASSESSMENT — PAIN SCALES - GENERAL
PAINLEVEL_OUTOF10: 0 - NO PAIN
PAINLEVEL_OUTOF10: 0 - NO PAIN
PAINLEVEL_OUTOF10: 7
PAINLEVEL_OUTOF10: 7

## 2024-09-25 ASSESSMENT — PAIN DESCRIPTION - LOCATION: LOCATION: BACK

## 2024-09-25 NOTE — CARE PLAN
The patient's goals for the shift include      The clinical goals for the shift include pt will pass gas    Over the shift, the patient did not make progress toward the following goals. Barriers to progression include . Recommendations to address these barriers includ  Problem: Pain - Adult  Goal: Verbalizes/displays adequate comfort level or baseline comfort level  Outcome: Progressing     Problem: Safety - Adult  Goal: Free from fall injury  Outcome: Progressing     Problem: Discharge Planning  Goal: Discharge to home or other facility with appropriate resources  Outcome: Progressing     Problem: Chronic Conditions and Co-morbidities  Goal: Patient's chronic conditions and co-morbidity symptoms are monitored and maintained or improved  Outcome: Progressing     Problem: Skin  Goal: Decreased wound size/increased tissue granulation at next dressing change  Outcome: Progressing  Goal: Participates in plan/prevention/treatment measures  Outcome: Progressing  Goal: Prevent/manage excess moisture  Outcome: Progressing  Goal: Prevent/minimize sheer/friction injuries  Outcome: Progressing  Goal: Promote/optimize nutrition  Outcome: Progressing  Goal: Promote skin healing  Outcome: Progressing     Problem: Fall/Injury  Goal: Not fall by end of shift  Outcome: Progressing  Goal: Be free from injury by end of the shift  Outcome: Progressing  Goal: Verbalize understanding of personal risk factors for fall in the hospital  Outcome: Progressing  Goal: Verbalize understanding of risk factor reduction measures to prevent injury from fall in the home  Outcome: Progressing  Goal: Use assistive devices by end of the shift  Outcome: Progressing  Goal: Pace activities to prevent fatigue by end of the shift  Outcome: Progressing     Problem: Pain  Goal: Takes deep breaths with improved pain control throughout the shift  Outcome: Progressing  Goal: Turns in bed with improved pain control throughout the shift  Outcome: Progressing  Goal:  Walks with improved pain control throughout the shift  Outcome: Progressing  Goal: Performs ADL's with improved pain control throughout shift  Outcome: Progressing  Goal: Participates in PT with improved pain control throughout the shift  Outcome: Progressing  Goal: Free from opioid side effects throughout the shift  Outcome: Progressing  Goal: Free from acute confusion related to pain meds throughout the shift  Outcome: Progressing    .

## 2024-09-25 NOTE — NURSING NOTE
RRT found patient on 2 Lpm nasal cannula with no active oxygen order. RRT added oxygen order per protocol under physician.

## 2024-09-25 NOTE — NURSING NOTE
Juana Lim, RN  Registered Nurse     Nursing Note      Signed     Date of Service: 9/24/2024  9:00 PM     Signed         Pt moved into [facility] for safety d/t code gray

## 2024-09-25 NOTE — PROGRESS NOTES
Gallo Schmidt 64 y.o. female    Subjective  Afebrile O/N.  No acute events O/N.  Pain controlled.  Tolerating FLD.  Denies nausea or emesis.  No flatus or BM yet.  Olvera catheter remains due to colovesical fistula.  Ambulating in hallways.    Objective  PHYSICAL EXAM:  Physical Exam  Vitals reviewed.   Constitutional:       General: She is awake.      Appearance: Normal appearance.   HENT:      Head: Normocephalic.      Mouth/Throat:      Mouth: Mucous membranes are moist.   Eyes:      Extraocular Movements: Extraocular movements intact.   Pulmonary:      Effort: Pulmonary effort is normal. No respiratory distress.      Breath sounds: Normal air entry. Decreased breath sounds present.   Abdominal:      General: Abdomen is flat. There is no distension.      Palpations: Abdomen is soft.      Tenderness: There is abdominal tenderness. There is no guarding or rebound.      Comments: Soft, non-distended.  Incisions well approximated with staples, C/D/I.  RUQ RENETTA with SS drainage. Appropriately TTP   Genitourinary:     Comments: Olvera catheter with clear yellow urine.   Musculoskeletal:         General: Normal range of motion.      Cervical back: Normal range of motion.   Skin:     General: Skin is warm and dry.   Neurological:      General: No focal deficit present.      Mental Status: She is alert and oriented to person, place, and time.   Psychiatric:         Behavior: Behavior is cooperative.         Vital signs in last 24 hours:  Vitals:    09/25/24 0400   BP: 144/90   Pulse: 69   Resp: 18   Temp: 36.3 °C (97.3 °F)   SpO2: 96%         Intake/Output this shift:    Intake/Output Summary (Last 24 hours) at 9/25/2024 0752  Last data filed at 9/25/2024 0600  Gross per 24 hour   Intake --   Output 2393 ml   Net -2393 ml        Allergies:  Allergies   Allergen Reactions    Lisinopril Cough    Sulfa (Sulfonamide Antibiotics) Rash        Medications:  Scheduled medications  aspirin, 81 mg, oral, Daily  atorvastatin, 80  mg, oral, Nightly  budesonide, 0.25 mg, nebulization, BID  enoxaparin, 40 mg, subcutaneous, q24h  formoterol, 20 mcg, nebulization, BID  gabapentin, 300 mg, oral, TID  ipratropium, 0.5 mg, nebulization, TID  isosorbide mononitrate ER, 30 mg, oral, Daily  [Held by provider] losartan, 50 mg, oral, Daily  metoprolol succinate XL, 25 mg, oral, Nightly  pantoprazole, 40 mg, oral, Daily before breakfast  [Held by provider] triamterene-hydrochlorothiazid, 1 tablet, oral, Daily      Continuous medications       PRN medications  PRN medications: acetaminophen, albuterol, hydrALAZINE, naloxone, ondansetron, oxyCODONE, oxyCODONE, oxygen       Labs:  Results for orders placed or performed during the hospital encounter of 09/23/24 (from the past 24 hour(s))   CBC   Result Value Ref Range    WBC 4.9 4.4 - 11.3 x10*3/uL    nRBC 0.0 0.0 - 0.0 /100 WBCs    RBC 3.33 (L) 4.00 - 5.20 x10*6/uL    Hemoglobin 10.2 (L) 12.0 - 16.0 g/dL    Hematocrit 32.5 (L) 36.0 - 46.0 %    MCV 98 80 - 100 fL    MCH 30.6 26.0 - 34.0 pg    MCHC 31.4 (L) 32.0 - 36.0 g/dL    RDW 14.6 (H) 11.5 - 14.5 %    Platelets 206 150 - 450 x10*3/uL   Comprehensive Metabolic Panel   Result Value Ref Range    Glucose 91 74 - 99 mg/dL    Sodium 140 136 - 145 mmol/L    Potassium 3.7 3.5 - 5.3 mmol/L    Chloride 104 98 - 107 mmol/L    Bicarbonate 32 21 - 32 mmol/L    Anion Gap 8 (L) 10 - 20 mmol/L    Urea Nitrogen 13 6 - 23 mg/dL    Creatinine 0.79 0.50 - 1.05 mg/dL    eGFR 85 >60 mL/min/1.73m*2    Calcium 8.8 8.6 - 10.3 mg/dL    Albumin 3.0 (L) 3.4 - 5.0 g/dL    Alkaline Phosphatase 74 33 - 136 U/L    Total Protein 5.3 (L) 6.4 - 8.2 g/dL    AST 17 9 - 39 U/L    Bilirubin, Total 0.5 0.0 - 1.2 mg/dL    ALT 19 7 - 45 U/L   Magnesium   Result Value Ref Range    Magnesium 1.78 1.60 - 2.40 mg/dL   Phosphorus   Result Value Ref Range    Phosphorus 2.3 (L) 2.5 - 4.9 mg/dL        Imaging:  No results found.     Plan  Colovesical fistula     POD#2:  S/p  Laparoscopic lysis of  adhesions  Laparoscopic takedown of colovesical fistula  Laparoscopic sigmoidectomy  Laparoscopic mobilization of splenic flexure  29 EEA CRA  Flexible sigmiodoscopy    - surgery as above  - avss  - Diet: continue FLD  - Multimodal pain control  - Nausea: antiemetics PRN  - Encourage OOB and IS  - Lovenox for DVT prophylaxis  - Maintain RENETTA drain  - Maintain srinivasan catheter.  Will need cystogram to r/o bladder leak on POD5  - DC IVF 9/24    #COPD  - Encourage IS  - Wean O2 as tolerated  - Breathing tx ordered     #HLD  - Home atorvastatin resumed    #HTN  - Home Imdur and metoprolol succinate XL resumed     #GERD  - Home PPI resumed     #Hypokalemia  - WNL today    #Hypophosphatemia  -  Replete today      - Daily labs      Brenabe Martinez MD    I spent 15 minutes in the professional and overall care of this patient.

## 2024-09-25 NOTE — CARE PLAN
The patient's goals for the shift include   getting up and ambulating around the unit.    The clinical goals for the shift include pt will pass gas    Over the shift, the patient did not make progress toward the following goals. She did not start passing any gas yet.

## 2024-09-26 LAB
ALBUMIN SERPL BCP-MCNC: 2.9 G/DL (ref 3.4–5)
ALP SERPL-CCNC: 69 U/L (ref 33–136)
ALT SERPL W P-5'-P-CCNC: 18 U/L (ref 7–45)
ANION GAP SERPL CALC-SCNC: 10 MMOL/L (ref 10–20)
AST SERPL W P-5'-P-CCNC: 19 U/L (ref 9–39)
BILIRUB SERPL-MCNC: 0.5 MG/DL (ref 0–1.2)
BUN SERPL-MCNC: 13 MG/DL (ref 6–23)
CALCIUM SERPL-MCNC: 8.5 MG/DL (ref 8.6–10.3)
CHLORIDE SERPL-SCNC: 103 MMOL/L (ref 98–107)
CO2 SERPL-SCNC: 31 MMOL/L (ref 21–32)
CREAT SERPL-MCNC: 0.75 MG/DL (ref 0.5–1.05)
EGFRCR SERPLBLD CKD-EPI 2021: 90 ML/MIN/1.73M*2
ERYTHROCYTE [DISTWIDTH] IN BLOOD BY AUTOMATED COUNT: 14.3 % (ref 11.5–14.5)
GLUCOSE SERPL-MCNC: 88 MG/DL (ref 74–99)
HCT VFR BLD AUTO: 34.8 % (ref 36–46)
HGB BLD-MCNC: 10.8 G/DL (ref 12–16)
MAGNESIUM SERPL-MCNC: 1.83 MG/DL (ref 1.6–2.4)
MCH RBC QN AUTO: 30.3 PG (ref 26–34)
MCHC RBC AUTO-ENTMCNC: 31 G/DL (ref 32–36)
MCV RBC AUTO: 98 FL (ref 80–100)
NRBC BLD-RTO: 0 /100 WBCS (ref 0–0)
PHOSPHATE SERPL-MCNC: 3.4 MG/DL (ref 2.5–4.9)
PLATELET # BLD AUTO: 214 X10*3/UL (ref 150–450)
POTASSIUM SERPL-SCNC: 3.6 MMOL/L (ref 3.5–5.3)
PROT SERPL-MCNC: 5.3 G/DL (ref 6.4–8.2)
RBC # BLD AUTO: 3.57 X10*6/UL (ref 4–5.2)
SODIUM SERPL-SCNC: 140 MMOL/L (ref 136–145)
WBC # BLD AUTO: 5.1 X10*3/UL (ref 4.4–11.3)

## 2024-09-26 PROCEDURE — 1100000001 HC PRIVATE ROOM DAILY

## 2024-09-26 PROCEDURE — 94640 AIRWAY INHALATION TREATMENT: CPT

## 2024-09-26 PROCEDURE — 36415 COLL VENOUS BLD VENIPUNCTURE: CPT | Performed by: NURSE PRACTITIONER

## 2024-09-26 PROCEDURE — 2500000002 HC RX 250 W HCPCS SELF ADMINISTERED DRUGS (ALT 637 FOR MEDICARE OP, ALT 636 FOR OP/ED): Performed by: STUDENT IN AN ORGANIZED HEALTH CARE EDUCATION/TRAINING PROGRAM

## 2024-09-26 PROCEDURE — 99231 SBSQ HOSP IP/OBS SF/LOW 25: CPT | Performed by: NURSE PRACTITIONER

## 2024-09-26 PROCEDURE — 2500000004 HC RX 250 GENERAL PHARMACY W/ HCPCS (ALT 636 FOR OP/ED): Performed by: STUDENT IN AN ORGANIZED HEALTH CARE EDUCATION/TRAINING PROGRAM

## 2024-09-26 PROCEDURE — 84100 ASSAY OF PHOSPHORUS: CPT | Performed by: NURSE PRACTITIONER

## 2024-09-26 PROCEDURE — 2500000001 HC RX 250 WO HCPCS SELF ADMINISTERED DRUGS (ALT 637 FOR MEDICARE OP): Performed by: NURSE PRACTITIONER

## 2024-09-26 PROCEDURE — 2500000001 HC RX 250 WO HCPCS SELF ADMINISTERED DRUGS (ALT 637 FOR MEDICARE OP): Performed by: STUDENT IN AN ORGANIZED HEALTH CARE EDUCATION/TRAINING PROGRAM

## 2024-09-26 PROCEDURE — 85027 COMPLETE CBC AUTOMATED: CPT | Performed by: NURSE PRACTITIONER

## 2024-09-26 PROCEDURE — 83735 ASSAY OF MAGNESIUM: CPT | Performed by: NURSE PRACTITIONER

## 2024-09-26 PROCEDURE — 84075 ASSAY ALKALINE PHOSPHATASE: CPT | Performed by: NURSE PRACTITIONER

## 2024-09-26 RX ORDER — BISACODYL 10 MG/1
10 SUPPOSITORY RECTAL ONCE
Status: COMPLETED | OUTPATIENT
Start: 2024-09-26 | End: 2024-09-26

## 2024-09-26 SDOH — ECONOMIC STABILITY: HOUSING INSECURITY: AT ANY TIME IN THE PAST 12 MONTHS, WERE YOU HOMELESS OR LIVING IN A SHELTER (INCLUDING NOW)?: PATIENT DECLINED

## 2024-09-26 SDOH — ECONOMIC STABILITY: FOOD INSECURITY: WITHIN THE PAST 12 MONTHS, YOU WORRIED THAT YOUR FOOD WOULD RUN OUT BEFORE YOU GOT MONEY TO BUY MORE.: PATIENT DECLINED

## 2024-09-26 SDOH — SOCIAL STABILITY: SOCIAL INSECURITY
WITHIN THE LAST YEAR, HAVE YOU BEEN KICKED, HIT, SLAPPED, OR OTHERWISE PHYSICALLY HURT BY YOUR PARTNER OR EX-PARTNER?: PATIENT DECLINED

## 2024-09-26 SDOH — ECONOMIC STABILITY: INCOME INSECURITY: HOW HARD IS IT FOR YOU TO PAY FOR THE VERY BASICS LIKE FOOD, HOUSING, MEDICAL CARE, AND HEATING?: PATIENT DECLINED

## 2024-09-26 SDOH — SOCIAL STABILITY: SOCIAL INSECURITY: WITHIN THE LAST YEAR, HAVE YOU BEEN AFRAID OF YOUR PARTNER OR EX-PARTNER?: PATIENT DECLINED

## 2024-09-26 SDOH — ECONOMIC STABILITY: INCOME INSECURITY
IN THE PAST 12 MONTHS, HAS THE ELECTRIC, GAS, OIL, OR WATER COMPANY THREATENED TO SHUT OFF SERVICE IN YOUR HOME?: PATIENT DECLINED

## 2024-09-26 SDOH — SOCIAL STABILITY: SOCIAL INSECURITY
WITHIN THE LAST YEAR, HAVE TO BEEN RAPED OR FORCED TO HAVE ANY KIND OF SEXUAL ACTIVITY BY YOUR PARTNER OR EX-PARTNER?: PATIENT DECLINED

## 2024-09-26 SDOH — ECONOMIC STABILITY: INCOME INSECURITY: IN THE LAST 12 MONTHS, WAS THERE A TIME WHEN YOU WERE NOT ABLE TO PAY THE MORTGAGE OR RENT ON TIME?: PATIENT DECLINED

## 2024-09-26 SDOH — ECONOMIC STABILITY: HOUSING INSECURITY: IN THE PAST 12 MONTHS, HOW MANY TIMES HAVE YOU MOVED WHERE YOU WERE LIVING?: 1

## 2024-09-26 SDOH — ECONOMIC STABILITY: FOOD INSECURITY: WITHIN THE PAST 12 MONTHS, THE FOOD YOU BOUGHT JUST DIDN'T LAST AND YOU DIDN'T HAVE MONEY TO GET MORE.: PATIENT DECLINED

## 2024-09-26 SDOH — SOCIAL STABILITY: SOCIAL INSECURITY
WITHIN THE LAST YEAR, HAVE YOU BEEN HUMILIATED OR EMOTIONALLY ABUSED IN OTHER WAYS BY YOUR PARTNER OR EX-PARTNER?: PATIENT DECLINED

## 2024-09-26 SDOH — ECONOMIC STABILITY: TRANSPORTATION INSECURITY
IN THE PAST 12 MONTHS, HAS THE LACK OF TRANSPORTATION KEPT YOU FROM MEDICAL APPOINTMENTS OR FROM GETTING MEDICATIONS?: PATIENT DECLINED

## 2024-09-26 SDOH — ECONOMIC STABILITY: TRANSPORTATION INSECURITY
IN THE PAST 12 MONTHS, HAS LACK OF TRANSPORTATION KEPT YOU FROM MEETINGS, WORK, OR FROM GETTING THINGS NEEDED FOR DAILY LIVING?: PATIENT DECLINED

## 2024-09-26 ASSESSMENT — PAIN DESCRIPTION - DESCRIPTORS: DESCRIPTORS: DISCOMFORT

## 2024-09-26 ASSESSMENT — COGNITIVE AND FUNCTIONAL STATUS - GENERAL
DAILY ACTIVITIY SCORE: 23
MOBILITY SCORE: 24
TOILETING: A LITTLE

## 2024-09-26 ASSESSMENT — PAIN SCALES - GENERAL
PAINLEVEL_OUTOF10: 4
PAINLEVEL_OUTOF10: 5 - MODERATE PAIN
PAINLEVEL_OUTOF10: 2
PAINLEVEL_OUTOF10: 4
PAINLEVEL_OUTOF10: 1

## 2024-09-26 ASSESSMENT — PAIN DESCRIPTION - ORIENTATION: ORIENTATION: MID

## 2024-09-26 ASSESSMENT — PAIN - FUNCTIONAL ASSESSMENT
PAIN_FUNCTIONAL_ASSESSMENT: 0-10

## 2024-09-26 ASSESSMENT — PAIN DESCRIPTION - LOCATION: LOCATION: ABDOMEN

## 2024-09-26 NOTE — CARE PLAN
The patient's goals for the shift include      The clinical goals for the shift include patient will pass gas by end of shift      Problem: Pain - Adult  Goal: Verbalizes/displays adequate comfort level or baseline comfort level  Outcome: Progressing     Problem: Safety - Adult  Goal: Free from fall injury  Outcome: Progressing     Problem: Discharge Planning  Goal: Discharge to home or other facility with appropriate resources  Outcome: Progressing     Problem: Chronic Conditions and Co-morbidities  Goal: Patient's chronic conditions and co-morbidity symptoms are monitored and maintained or improved  Outcome: Progressing     Problem: Skin  Goal: Decreased wound size/increased tissue granulation at next dressing change  Outcome: Progressing  Goal: Participates in plan/prevention/treatment measures  Outcome: Progressing  Goal: Prevent/manage excess moisture  9/26/2024 0911 by Zohra Tineo RN  Outcome: Progressing  9/26/2024 0911 by Zohra Tineo RN  Flowsheets (Taken 9/26/2024 0911)  Prevent/manage excess moisture:   Moisturize dry skin   Monitor for/manage infection if present   Cleanse incontinence/protect with barrier cream  Goal: Prevent/minimize sheer/friction injuries  Outcome: Progressing  Goal: Promote/optimize nutrition  Outcome: Progressing  Goal: Promote skin healing  Outcome: Progressing     Problem: Fall/Injury  Goal: Not fall by end of shift  Outcome: Progressing  Goal: Be free from injury by end of the shift  Outcome: Progressing  Goal: Verbalize understanding of personal risk factors for fall in the hospital  Outcome: Progressing  Goal: Verbalize understanding of risk factor reduction measures to prevent injury from fall in the home  Outcome: Progressing  Goal: Use assistive devices by end of the shift  Outcome: Progressing  Goal: Pace activities to prevent fatigue by end of the shift  Outcome: Progressing     Problem: Pain  Goal: Takes deep breaths with improved pain control throughout the  shift  Outcome: Progressing  Goal: Turns in bed with improved pain control throughout the shift  Outcome: Progressing  Goal: Walks with improved pain control throughout the shift  Outcome: Progressing  Goal: Performs ADL's with improved pain control throughout shift  Outcome: Progressing  Goal: Participates in PT with improved pain control throughout the shift  Outcome: Progressing  Goal: Free from opioid side effects throughout the shift  Outcome: Progressing  Goal: Free from acute confusion related to pain meds throughout the shift  Outcome: Progressing

## 2024-09-26 NOTE — PROGRESS NOTES
"Nutrition Diet Education  Reason for education: CORS    Nutrition Note:  Gallo Schmidt \"Georgiana\" is a 64 y.o. female s/p lysis of ahesions, takedown of colovesical fistula, and sigmoidectomy on .     Past Medical History   has a past medical history of Acute exacerbation of chronic bronchitis (Multi) (2023), COPD (chronic obstructive pulmonary disease) (Multi), Cough (2023), Diverticulitis, GERD (gastroesophageal reflux disease) (), Hyperlipidemia, Hypertension, Lung nodule (), Pneumonia (), Procedure and treatment not carried out because of patient's decision for unspecified reasons (2021), Pulmonary hypertension (Multi), Sleep apnea, obstructive (), and Wheezing on auscultation (2023).  Surgical History   has a past surgical history that includes CT guided abscess fluid collection drainage visceral Perq (2022); Colonoscopy (2022);  section, classic; Ovarian cyst removal; Hand surgery (Left); and Cardiac catheterization.  Nutrition Significant Labs:  BMP Trend:   Results from last 7 days   Lab Units 24  0512 24  0613 24  0557   GLUCOSE mg/dL 88 91 91   CALCIUM mg/dL 8.5* 8.8 8.6   SODIUM mmol/L 140 140 139   POTASSIUM mmol/L 3.6 3.7 3.4*   CO2 mmol/L 31 32 30   CHLORIDE mmol/L 103 104 102   BUN mg/dL 13 13 8   CREATININE mg/dL 0.75 0.79 0.71        Current Diet: Adult diet Full Liquid    Encounter summary: Met with pt at bedside.  States she has always had a \"weak stomach\" and that it is normal for her to only have 2-3 bowel movements weekly.  She denies and food allergies but states some lactose intolerance.  She takes lactase pills at home if she is going to drink a glass of milk, however has tolerated yogurt and cream of wheat well on the FLD without lactase pills.  States that she does not eat fast food and if she does she will get a small lynn and black cup of coffee because everything else hurts her stomach.  Pt with FLD in " "front of her, notes dislike of Nicko supplement however has not tried fruit punch flavor yet and she states she would \"probably like that better.\"    Education Documentation:   Education Documentation  Nutrition Care Manual, taught by Davey Michelle RD at 9/26/2024  2:33 PM.  Learner: Patient  Readiness: Acceptance  Method: Explanation, Handout  Response: Verbalizes Understanding  Comment: Provided and discussed Low Fiber Nutrition Therapy handout from [facility] and discussed fiber's role in a healthy diet after temporarily decreasing to minimize discomfort post surgery.  Contact information provided.              Nutrition Prescription/Recommended Diet:  Individualized Nutrition Prescription Provided for : Recommend advance with tolerance to goal of Fiber Restricted diet    Time Spent/Follow-up Reminder:   Time Spent (min): 60 minutes  Last Date of Nutrition Visit: 09/26/24  Nutrition Follow-Up Needed?: 5-7 days  Follow up Comment: DARIUS   "

## 2024-09-26 NOTE — PROGRESS NOTES
Gallo Schmidt 64 y.o. female    Subjective  Patient seen and examined this morning.  Denies nausea and vomiting.  No fever or chills.  Pain controlled.  Olvera catheter with clear yellow urine. No flatus or BM as of yet.  Up ambulating. Denies CP and SOB. No acute events overnight.    Objective  PHYSICAL EXAM:  Physical Exam  Vitals reviewed.   Constitutional:       General: She is awake.      Appearance: Normal appearance.   Cardiovascular:      Rate and Rhythm: Normal rate and regular rhythm.      Pulses: Normal pulses.      Heart sounds: Normal heart sounds.   Pulmonary:      Effort: Pulmonary effort is normal.      Breath sounds: Normal air entry. Decreased breath sounds present.   Abdominal:      General: Abdomen is flat. There is no distension.      Palpations: Abdomen is soft.      Tenderness: There is abdominal tenderness. There is no guarding or rebound.      Comments: Soft, non-distended.  Incisions well approximated with staples, C/D/I.  RUQ RENETTA with SS drainage. Appropriately TTP   Genitourinary:     Comments: Olvera catheter with clear yellow urine.   Musculoskeletal:         General: Normal range of motion.      Cervical back: Normal range of motion.   Skin:     General: Skin is warm and dry.   Neurological:      General: No focal deficit present.      Mental Status: She is alert and oriented to person, place, and time.   Psychiatric:         Behavior: Behavior is cooperative.         Vital signs in last 24 hours:  Vitals:    09/26/24 0400   BP: 131/78   Pulse: 75   Resp: 16   Temp: 35.9 °C (96.6 °F)   SpO2: 98%         Intake/Output this shift:    Intake/Output Summary (Last 24 hours) at 9/26/2024 0805  Last data filed at 9/26/2024 0705  Gross per 24 hour   Intake --   Output 2315 ml   Net -2315 ml        Allergies:  Allergies   Allergen Reactions    Lisinopril Cough    Sulfa (Sulfonamide Antibiotics) Rash        Medications:  Scheduled medications  aspirin, 81 mg, oral, Daily  atorvastatin, 80  mg, oral, Nightly  budesonide, 0.25 mg, nebulization, BID  enoxaparin, 40 mg, subcutaneous, q24h  formoterol, 20 mcg, nebulization, BID  gabapentin, 300 mg, oral, TID  ipratropium, 0.5 mg, nebulization, TID  isosorbide mononitrate ER, 30 mg, oral, Daily  [Held by provider] losartan, 50 mg, oral, Daily  metoprolol succinate XL, 25 mg, oral, Nightly  pantoprazole, 40 mg, oral, Daily before breakfast  [Held by provider] triamterene-hydrochlorothiazid, 1 tablet, oral, Daily      Continuous medications       PRN medications  PRN medications: acetaminophen, albuterol, hydrALAZINE, naloxone, ondansetron, oxyCODONE, oxyCODONE, oxygen       Labs:  Results for orders placed or performed during the hospital encounter of 09/23/24 (from the past 24 hour(s))   CBC   Result Value Ref Range    WBC 5.1 4.4 - 11.3 x10*3/uL    nRBC 0.0 0.0 - 0.0 /100 WBCs    RBC 3.57 (L) 4.00 - 5.20 x10*6/uL    Hemoglobin 10.8 (L) 12.0 - 16.0 g/dL    Hematocrit 34.8 (L) 36.0 - 46.0 %    MCV 98 80 - 100 fL    MCH 30.3 26.0 - 34.0 pg    MCHC 31.0 (L) 32.0 - 36.0 g/dL    RDW 14.3 11.5 - 14.5 %    Platelets 214 150 - 450 x10*3/uL   Comprehensive Metabolic Panel   Result Value Ref Range    Glucose 88 74 - 99 mg/dL    Sodium 140 136 - 145 mmol/L    Potassium 3.6 3.5 - 5.3 mmol/L    Chloride 103 98 - 107 mmol/L    Bicarbonate 31 21 - 32 mmol/L    Anion Gap 10 10 - 20 mmol/L    Urea Nitrogen 13 6 - 23 mg/dL    Creatinine 0.75 0.50 - 1.05 mg/dL    eGFR 90 >60 mL/min/1.73m*2    Calcium 8.5 (L) 8.6 - 10.3 mg/dL    Albumin 2.9 (L) 3.4 - 5.0 g/dL    Alkaline Phosphatase 69 33 - 136 U/L    Total Protein 5.3 (L) 6.4 - 8.2 g/dL    AST 19 9 - 39 U/L    Bilirubin, Total 0.5 0.0 - 1.2 mg/dL    ALT 18 7 - 45 U/L   Magnesium   Result Value Ref Range    Magnesium 1.83 1.60 - 2.40 mg/dL   Phosphorus   Result Value Ref Range    Phosphorus 3.4 2.5 - 4.9 mg/dL        Imaging:  No results found.     Plan  Colovesical fistula     POD#1:  S/p  Laparoscopic lysis of  adhesions  Laparoscopic takedown of colovesical fistula  Laparoscopic sigmoidectomy  Laparoscopic mobilization of splenic flexure  29 EEA CRA  Flexible sigmiodoscopy    - surgery as above  - avss  - Diet: Continue FLD  - Multimodal pain control  - Nausea: antiemetics PRN  - Encourage OOB and IS  - Lovenox for DVT prophylaxis  - Maintain RENETTA drain  - Maintain srinivasan catheter.  Will need cystogram to r/o bladder leak tomorrow 9/27  - DC IVF 9/24  - Dulcolax suppository x1 today    #COPD  - Encourage IS  - Wean O2 as tolerated  - Breathing tx ordered     #HLD  - Home atorvastatin resumed    #HTN  - Home Imdur and metoprolol succinate XL resumed     #GERD  - Home PPI resumed     #Hypokalemia  - WNL    #Hypophosphatemia  - WNL    - Daily labs    Plan of care discussed and patient seen with Dr. Juan Yo, APRN-CNP    I spent 25 minutes in the professional and overall care of this patient.

## 2024-09-27 ENCOUNTER — APPOINTMENT (OUTPATIENT)
Dept: RADIOLOGY | Facility: HOSPITAL | Age: 64
End: 2024-09-27
Payer: COMMERCIAL

## 2024-09-27 VITALS
OXYGEN SATURATION: 92 % | TEMPERATURE: 96.6 F | BODY MASS INDEX: 33.2 KG/M2 | SYSTOLIC BLOOD PRESSURE: 145 MMHG | RESPIRATION RATE: 18 BRPM | DIASTOLIC BLOOD PRESSURE: 84 MMHG | HEIGHT: 59 IN | WEIGHT: 164.68 LBS | HEART RATE: 85 BPM

## 2024-09-27 LAB
ALBUMIN SERPL BCP-MCNC: 2.9 G/DL (ref 3.4–5)
ALP SERPL-CCNC: 105 U/L (ref 33–136)
ALT SERPL W P-5'-P-CCNC: 41 U/L (ref 7–45)
ANION GAP SERPL CALC-SCNC: 10 MMOL/L (ref 10–20)
AST SERPL W P-5'-P-CCNC: 55 U/L (ref 9–39)
BILIRUB SERPL-MCNC: 0.5 MG/DL (ref 0–1.2)
BUN SERPL-MCNC: 11 MG/DL (ref 6–23)
CALCIUM SERPL-MCNC: 8.6 MG/DL (ref 8.6–10.3)
CHLORIDE SERPL-SCNC: 103 MMOL/L (ref 98–107)
CO2 SERPL-SCNC: 29 MMOL/L (ref 21–32)
CREAT SERPL-MCNC: 0.71 MG/DL (ref 0.5–1.05)
EGFRCR SERPLBLD CKD-EPI 2021: >90 ML/MIN/1.73M*2
ERYTHROCYTE [DISTWIDTH] IN BLOOD BY AUTOMATED COUNT: 14.3 % (ref 11.5–14.5)
GLUCOSE SERPL-MCNC: 89 MG/DL (ref 74–99)
HCT VFR BLD AUTO: 33 % (ref 36–46)
HGB BLD-MCNC: 10.3 G/DL (ref 12–16)
MAGNESIUM SERPL-MCNC: 1.88 MG/DL (ref 1.6–2.4)
MCH RBC QN AUTO: 30.1 PG (ref 26–34)
MCHC RBC AUTO-ENTMCNC: 31.2 G/DL (ref 32–36)
MCV RBC AUTO: 97 FL (ref 80–100)
NRBC BLD-RTO: 0 /100 WBCS (ref 0–0)
PHOSPHATE SERPL-MCNC: 3.9 MG/DL (ref 2.5–4.9)
PLATELET # BLD AUTO: 247 X10*3/UL (ref 150–450)
POTASSIUM SERPL-SCNC: 3.7 MMOL/L (ref 3.5–5.3)
PROT SERPL-MCNC: 5.4 G/DL (ref 6.4–8.2)
RBC # BLD AUTO: 3.42 X10*6/UL (ref 4–5.2)
SODIUM SERPL-SCNC: 138 MMOL/L (ref 136–145)
WBC # BLD AUTO: 4.6 X10*3/UL (ref 4.4–11.3)

## 2024-09-27 PROCEDURE — 74430 CONTRAST X-RAY BLADDER: CPT

## 2024-09-27 PROCEDURE — 2500000001 HC RX 250 WO HCPCS SELF ADMINISTERED DRUGS (ALT 637 FOR MEDICARE OP): Performed by: STUDENT IN AN ORGANIZED HEALTH CARE EDUCATION/TRAINING PROGRAM

## 2024-09-27 PROCEDURE — 2550000001 HC RX 255 CONTRASTS: Performed by: STUDENT IN AN ORGANIZED HEALTH CARE EDUCATION/TRAINING PROGRAM

## 2024-09-27 PROCEDURE — 84100 ASSAY OF PHOSPHORUS: CPT | Performed by: NURSE PRACTITIONER

## 2024-09-27 PROCEDURE — 83735 ASSAY OF MAGNESIUM: CPT | Performed by: NURSE PRACTITIONER

## 2024-09-27 PROCEDURE — 85027 COMPLETE CBC AUTOMATED: CPT | Performed by: NURSE PRACTITIONER

## 2024-09-27 PROCEDURE — 84075 ASSAY ALKALINE PHOSPHATASE: CPT | Performed by: NURSE PRACTITIONER

## 2024-09-27 PROCEDURE — 2500000002 HC RX 250 W HCPCS SELF ADMINISTERED DRUGS (ALT 637 FOR MEDICARE OP, ALT 636 FOR OP/ED): Performed by: STUDENT IN AN ORGANIZED HEALTH CARE EDUCATION/TRAINING PROGRAM

## 2024-09-27 PROCEDURE — 99231 SBSQ HOSP IP/OBS SF/LOW 25: CPT | Performed by: NURSE PRACTITIONER

## 2024-09-27 PROCEDURE — 36415 COLL VENOUS BLD VENIPUNCTURE: CPT | Performed by: NURSE PRACTITIONER

## 2024-09-27 PROCEDURE — 94640 AIRWAY INHALATION TREATMENT: CPT

## 2024-09-27 PROCEDURE — 74430 CONTRAST X-RAY BLADDER: CPT | Performed by: RADIOLOGY

## 2024-09-27 RX ORDER — ACETAMINOPHEN 325 MG/1
650 TABLET ORAL EVERY 6 HOURS PRN
Start: 2024-09-27

## 2024-09-27 ASSESSMENT — PAIN - FUNCTIONAL ASSESSMENT
PAIN_FUNCTIONAL_ASSESSMENT: 0-10

## 2024-09-27 ASSESSMENT — PAIN SCALES - GENERAL
PAINLEVEL_OUTOF10: 5 - MODERATE PAIN
PAINLEVEL_OUTOF10: 4
PAINLEVEL_OUTOF10: 5 - MODERATE PAIN
PAINLEVEL_OUTOF10: 4
PAINLEVEL_OUTOF10: 4

## 2024-09-27 ASSESSMENT — PAIN DESCRIPTION - LOCATION: LOCATION: ABDOMEN

## 2024-09-27 NOTE — CARE PLAN
Problem: Pain - Adult  Goal: Verbalizes/displays adequate comfort level or baseline comfort level  Outcome: Progressing     Problem: Safety - Adult  Goal: Free from fall injury  Outcome: Progressing     Problem: Discharge Planning  Goal: Discharge to home or other facility with appropriate resources  Outcome: Progressing     Problem: Chronic Conditions and Co-morbidities  Goal: Patient's chronic conditions and co-morbidity symptoms are monitored and maintained or improved  Outcome: Progressing     Problem: Skin  Goal: Decreased wound size/increased tissue granulation at next dressing change  Outcome: Progressing  Goal: Participates in plan/prevention/treatment measures  Outcome: Progressing  Goal: Prevent/manage excess moisture  Outcome: Progressing  Goal: Prevent/minimize sheer/friction injuries  Outcome: Progressing  Goal: Promote/optimize nutrition  Outcome: Progressing  Goal: Promote skin healing  Outcome: Progressing     Problem: Fall/Injury  Goal: Not fall by end of shift  Outcome: Progressing  Goal: Be free from injury by end of the shift  Outcome: Progressing  Goal: Verbalize understanding of personal risk factors for fall in the hospital  Outcome: Progressing  Goal: Verbalize understanding of risk factor reduction measures to prevent injury from fall in the home  Outcome: Progressing  Goal: Use assistive devices by end of the shift  Outcome: Progressing  Goal: Pace activities to prevent fatigue by end of the shift  Outcome: Progressing     Problem: Pain  Goal: Takes deep breaths with improved pain control throughout the shift  Outcome: Progressing  Goal: Turns in bed with improved pain control throughout the shift  Outcome: Progressing  Goal: Walks with improved pain control throughout the shift  Outcome: Progressing  Goal: Performs ADL's with improved pain control throughout shift  Outcome: Progressing  Goal: Participates in PT with improved pain control throughout the shift  Outcome: Progressing  Goal:  Free from opioid side effects throughout the shift  Outcome: Progressing  Goal: Free from acute confusion related to pain meds throughout the shift  Outcome: Progressing   The patient's goals for the shift include      The clinical goals for the shift include Pt will have minimal pain this shift and receive rest.    Over the shift, the patient did receive rest.

## 2024-09-27 NOTE — DISCHARGE SUMMARY
Discharge Diagnosis  #Colovesical fistula  #COPD  #Hyperlipidemia  #Hypertension  #Gastroesophageal reflux disease  #Hypokalemia  #Hypophosphatemia    Issues Requiring Follow-Up  Post-operative follow-up    Test Results Pending At Discharge  Pending Labs       Order Current Status    Surgical Pathology Exam In process            Hospital Course   Patient was taken to the operating room on day of admission, 9/23/2024, for laparoscopic lysis of adhesions, laparoscopic takedown of colovesical fistula, laparoscopic sigmoidectomy, laparoscopic mobilization of splenic flexure, 29 EEA colorectal anastomosis, and flexible sigmoidoscopy.  Patient tolerated the surgery well and postoperatively was taken to the regular nursing floor.  Patient was initiated on ERS protocol with clear liquid diet, low rate of intravenous fluids, multimodal pain management regimen, and DVT chemoprophylaxis.  Olvera catheter was maintained due to colovesical fistula.  Patient was reinitiated on home atorvastatin for underlying history of hyperlipidemia, Imdur and metoprolol for underlying history of hypertension, PPI for underlying history of gastroesophageal reflux disease.  Patient was encouraged bronchopulmonary hygiene due to underlying history of COPD.  Postoperative day 1, patient was advanced to full liquid diet.  Intravenous fluids were discontinued.  Patient was noted to have hypokalemia and this was repleted.  On postoperative day 2, patient was noted to have hypophosphatemia and this was repleted.  On postoperative day 3, patient had return of bowel function with passage of flatus and stool.  She was advanced to a fiber restricted diet.  On postoperative day 4, cystogram was performed demonstrating no evidence of leak from the bladder.  The Olvera catheter was removed.  Patient was able to void independently.  By this time, patient had achieved adequate pain control, tolerated diet advancement, had full return of bowel function, and was  also able to ambulate independently.  She was deemed appropriate for discharge and thus discharged to home instructions for wound care, pain control, diet, activity, and follow-up.  Operative drain was removed on postoperative day 4.    Pertinent Physical Exam At Time of Discharge  Physical Exam  See progress note from day of discharge.    Home Medications     Medication List      START taking these medications     acetaminophen 325 mg tablet; Commonly known as: Tylenol; Take 2 tablets   (650 mg) by mouth every 6 hours if needed for mild pain (1 - 3).     CHANGE how you take these medications     atorvastatin 80 mg tablet; Commonly known as: Lipitor; Take 1 tablet (80   mg) by mouth once daily.; What changed: when to take this   metoprolol succinate XL 25 mg 24 hr tablet; Commonly known as:   Toprol-XL; TAKE 1 TABLET (25 MG) BY MOUTH ONCE DAILY.; What changed: when   to take this     CONTINUE taking these medications     albuterol 90 mcg/actuation inhaler   aspirin 81 mg chewable tablet; CHEW AND SWALLOW 1 TABLET BY MOUTH ONCE   DAILY   azelastine 137 mcg (0.1 %) nasal spray; Commonly known as: Astelin;   Administer 2 sprays into each nostril 2 times a day for 15 days. Use in   each nostril as directed   Breztri Aerosphere 160-9-4.8 mcg/actuation HFA aerosol inhaler; Generic   drug: budesonide-glycopyr-formoterol; 2 puffs BID May have 90 day supply.   cholecalciferol 25 MCG (1000 UT) tablet; Commonly known as: Vitamin D-3   cyanocobalamin 1,000 mcg tablet; Commonly known as: Vitamin B-12; Take 1   tablet (1,000 mcg) by mouth once daily.   isosorbide mononitrate ER 30 mg 24 hr tablet; Commonly known as: Imdur;   Take 1 tablet (30 mg) by mouth once daily.   lactase 3,000 unit tablet; Commonly known as: Lactaid   losartan 50 mg tablet; Commonly known as: Cozaar; Take 1 tablet (50 mg)   by mouth once daily.   omeprazole OTC 20 mg EC tablet; Commonly known as: PriLOSEC OTC   potassium chloride 20 mEq packet; Commonly  known as: Klor-Con; DISSOLVE   1 PACKET (20 MEQ) IN FLUID AND DRINK BY MOUTH ONCE DAILY AS DIRECTED   triamterene-hydrochlorothiazid 37.5-25 mg tablet; Commonly known as:   Maxzide-25; TAKE 1 TABLET BY MOUTH ONCE DAILY.     STOP taking these medications     chlorhexidine 0.12 % solution; Commonly known as: Peridex   gabapentin 100 mg capsule; Commonly known as: Neurontin   metroNIDAZOLE 250 mg tablet; Commonly known as: Flagyl   neomycin 500 mg tablet; Commonly known as: Mycifradin       Outpatient Follow-Up  Future Appointments   Date Time Provider Department Center   12/10/2024 12:40 PM Tanvi Holguin PA-C [id number] [facility]   1/13/2025 11:00 AM Obdulio Chakraborty DO [id number] [facility]   9/18/2025  1:00 PM Josh Navas MD [id number] [facility]       Bernabe Martinez MD

## 2024-09-27 NOTE — DISCHARGE INSTRUCTIONS
WOUND CARE:  OK to shower.  Remove any dressings prior to showering.  Do not scrub your incision(s).  Pat down abdomen dry following shower and cover incisions with clean, dry gauze dressings if there is drainage.  No tub bath/soaking/swimming until cleared at outpatient appointment.  Do not remove any staples or stitches; if these are present they will be removed at outpatient appointment.  Any glue will peel away on its own.     PAIN CONTROL:  Can utilize ibuprofen or tylenol for pain control     ACTIVITY:  No heavy lifting (>10-15lbs) or strenuous activity for 6 weeks.  OK to walk and take stairs at slow pace.  Do not drive or operative heavy machinery for at least first 24 hours after surgery     DIET:  Adhere to soft low-fiber diet until seen in follow-up.     FOLLOW-UP:  Please call office at [phone] to set up a follow-up appointment for approximately 2 weeks from the date of surgery.

## 2024-09-27 NOTE — PROGRESS NOTES
Gallo Schmidt 64 y.o. female    Subjective  Patient seen and examined this morning.  Denies nausea and vomiting.  No fever or chills.  Pain controlled.  Olvera catheter with clear yellow urine. Tolerating low fiber diet. Last flatus yesterday, has had multiple bowel movements.  Up ambulating. Denies CP and SOB. No acute events overnight.    Objective  PHYSICAL EXAM:  Physical Exam  Vitals reviewed.   Constitutional:       General: She is awake.      Appearance: Normal appearance.   Cardiovascular:      Rate and Rhythm: Normal rate and regular rhythm.      Pulses: Normal pulses.      Heart sounds: Normal heart sounds.   Pulmonary:      Effort: Pulmonary effort is normal.      Breath sounds: Normal air entry. Decreased breath sounds present.   Abdominal:      General: Abdomen is flat. There is no distension.      Palpations: Abdomen is soft.      Tenderness: There is abdominal tenderness. There is no guarding or rebound.      Comments: Soft, non-distended.  Incisions well approximated with staples, C/D/I.  RUQ RENETTA with SS drainage. Appropriately TTP   Genitourinary:     Comments: Olvera catheter with clear yellow urine.   Musculoskeletal:         General: Normal range of motion.      Cervical back: Normal range of motion.   Skin:     General: Skin is warm and dry.   Neurological:      General: No focal deficit present.      Mental Status: She is alert and oriented to person, place, and time.   Psychiatric:         Behavior: Behavior is cooperative.         Vital signs in last 24 hours:  Vitals:    09/27/24 0900   BP:    Pulse:    Resp:    Temp:    SpO2: 92%         Intake/Output this shift:    Intake/Output Summary (Last 24 hours) at 9/27/2024 1022  Last data filed at 9/27/2024 0604  Gross per 24 hour   Intake --   Output 2080 ml   Net -2080 ml        Allergies:  Allergies   Allergen Reactions    Lisinopril Cough    Sulfa (Sulfonamide Antibiotics) Rash        Medications:  Scheduled medications  aspirin, 81 mg,  oral, Daily  atorvastatin, 80 mg, oral, Nightly  budesonide, 0.25 mg, nebulization, BID  enoxaparin, 40 mg, subcutaneous, q24h  formoterol, 20 mcg, nebulization, BID  gabapentin, 300 mg, oral, TID  ipratropium, 0.5 mg, nebulization, TID  isosorbide mononitrate ER, 30 mg, oral, Daily  [Held by provider] losartan, 50 mg, oral, Daily  metoprolol succinate XL, 25 mg, oral, Nightly  pantoprazole, 40 mg, oral, Daily before breakfast  [Held by provider] triamterene-hydrochlorothiazid, 1 tablet, oral, Daily      Continuous medications       PRN medications  PRN medications: acetaminophen, albuterol, hydrALAZINE, naloxone, ondansetron, oxyCODONE, oxyCODONE, oxygen       Labs:  Results for orders placed or performed during the hospital encounter of 09/23/24 (from the past 24 hour(s))   CBC   Result Value Ref Range    WBC 4.6 4.4 - 11.3 x10*3/uL    nRBC 0.0 0.0 - 0.0 /100 WBCs    RBC 3.42 (L) 4.00 - 5.20 x10*6/uL    Hemoglobin 10.3 (L) 12.0 - 16.0 g/dL    Hematocrit 33.0 (L) 36.0 - 46.0 %    MCV 97 80 - 100 fL    MCH 30.1 26.0 - 34.0 pg    MCHC 31.2 (L) 32.0 - 36.0 g/dL    RDW 14.3 11.5 - 14.5 %    Platelets 247 150 - 450 x10*3/uL   Comprehensive Metabolic Panel   Result Value Ref Range    Glucose 89 74 - 99 mg/dL    Sodium 138 136 - 145 mmol/L    Potassium 3.7 3.5 - 5.3 mmol/L    Chloride 103 98 - 107 mmol/L    Bicarbonate 29 21 - 32 mmol/L    Anion Gap 10 10 - 20 mmol/L    Urea Nitrogen 11 6 - 23 mg/dL    Creatinine 0.71 0.50 - 1.05 mg/dL    eGFR >90 >60 mL/min/1.73m*2    Calcium 8.6 8.6 - 10.3 mg/dL    Albumin 2.9 (L) 3.4 - 5.0 g/dL    Alkaline Phosphatase 105 33 - 136 U/L    Total Protein 5.4 (L) 6.4 - 8.2 g/dL    AST 55 (H) 9 - 39 U/L    Bilirubin, Total 0.5 0.0 - 1.2 mg/dL    ALT 41 7 - 45 U/L   Magnesium   Result Value Ref Range    Magnesium 1.88 1.60 - 2.40 mg/dL   Phosphorus   Result Value Ref Range    Phosphorus 3.9 2.5 - 4.9 mg/dL        Imaging:  No results found.     Plan  Colovesical fistula     POD#4:   S/p  Laparoscopic lysis of adhesions  Laparoscopic takedown of colovesical fistula  Laparoscopic sigmoidectomy  Laparoscopic mobilization of splenic flexure  29 EEA CRA  Flexible sigmiodoscopy    - surgery as above  - avss  - Diet: Advance to low fiber   - Multimodal pain control  - Nausea: antiemetics PRN  - Encourage OOB and IS  - Lovenox for DVT prophylaxis  - Maintain RENETTA drain - will plan to remove prior to discharge.   - Maintain srinivasan catheter for now.  Cystogram today to r/o bladder leak.  If negative will remove srinivasan catheter.  - DC IVF 9/24  - Dulcolax suppository x1 yesterday     #COPD  - Encourage IS  - Wean O2 as tolerated  - Breathing tx ordered     #HLD  - Home atorvastatin resumed    #HTN  - Home Imdur and metoprolol succinate XL resumed     #GERD  - Home PPI resumed     #Hypokalemia  - WNL    #Hypophosphatemia  - WNL    - Daily labs    Plan of care discussed with Dr. Martinez.    Ruthy Yo, APRN-CNP    I spent 25 minutes in the professional and overall care of this patient.

## 2024-09-30 LAB
LABORATORY COMMENT REPORT: NORMAL
PATH REPORT.FINAL DX SPEC: NORMAL
PATH REPORT.GROSS SPEC: NORMAL
PATH REPORT.RELEVANT HX SPEC: NORMAL
PATH REPORT.TOTAL CANCER: NORMAL
RESIDENT REVIEW: NORMAL

## 2024-10-01 ENCOUNTER — PATIENT OUTREACH (OUTPATIENT)
Dept: PRIMARY CARE | Facility: CLINIC | Age: 64
End: 2024-10-01
Payer: COMMERCIAL

## 2024-10-01 DIAGNOSIS — N32.1 COLOVESICAL FISTULA: ICD-10-CM

## 2024-10-01 DIAGNOSIS — Z09 HOSPITAL DISCHARGE FOLLOW-UP: ICD-10-CM

## 2024-10-01 NOTE — PROGRESS NOTES
TCM outreach completed: 9/30/24     Detailed message left providing call back phone number.  No return call as of this note.   If patient schedules follow up within 14 days of discharge, visit is TCM billable. Needs seen by: 10/14/24.   Message sent to PRACTICE STAFF to reach out to patient and schedule an appointment within 14 days from discharge date.      Discharge Facility: [facility]  Discharge Diagnosis: Colovesical fistula, COPD, Hyperlipidemia, Hypertension, Gastroesophageal reflux disease, Hypokalemia, Hypophosphatemia  Admission Date: 9/23/24  Discharge Date: 9/27/24  Specialist Appointment Date: Surgeon- 10/15/24  --Hospital Encounter and Summary Linked: Yes--  Hospital Course:   Patient was taken to the operating room on day of admission, 9/23/2024, for laparoscopic lysis of adhesions, laparoscopic takedown of colovesical fistula, laparoscopic sigmoidectomy, laparoscopic mobilization of splenic flexure, 29 EEA colorectal anastomosis, and flexible sigmoidoscopy

## 2024-10-10 ASSESSMENT — ENCOUNTER SYMPTOMS
FEVER: 0
ACTIVITY CHANGE: 0
UNEXPECTED WEIGHT CHANGE: 0
WHEEZING: 1
VOMITING: 0
DIFFICULTY URINATING: 0
ANAL BLEEDING: 0
HEMATURIA: 0
WEAKNESS: 0
COUGH: 0
BLOOD IN STOOL: 0
NAUSEA: 0
CHEST TIGHTNESS: 0
AGITATION: 0
CONSTIPATION: 0
DIARRHEA: 0
CHILLS: 0
FATIGUE: 0
SHORTNESS OF BREATH: 0
DIZZINESS: 0
ABDOMINAL DISTENTION: 0
APPETITE CHANGE: 0
PALPITATIONS: 0
LIGHT-HEADEDNESS: 0
CHOKING: 0
RECTAL PAIN: 0

## 2024-10-10 NOTE — PROGRESS NOTES
"ALEM Schmidt \"Ernie" is a 64 y.o. female who presented to OSH ED on 7/10/24 for abdominal pain. 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 She was recommended to receive IV antibiotics and be seen by a specialist, she declined. She wanted to take oral antibiotics and follow up with primary urologists. She was rx cipro and flagyl and discharged.  She was referred by Dr. Lopez for a colovesical fistula. She completed a colonoscopy with Dr. Clifton on 24, which showed extensive diverticulosis of severe severity in the sigmoid colon and medium hemorrhoids.     She is s/p  Laparoscopic takedown of colovesical fistula, Laparoscopic sigmoidectomy, Laparoscopic mobilization of splenic flexure, and 29 EEA CRA on 24. Path demonstrating diverticular disease with rupture and acute and organizing serositis, clinically colovesical fistula. Reactive lymph nodes. She had an uneventful stay and was discharged home on 24, after cystogram showed no leak or fistula. She presents today for follow up.     Denies any significant abdominal pain.  Appetite is good.  Denies nausea or emesis.  Moving bowels every other day.  Stools formed.  Denies hematochezia, melena.  Denies dysuria, hematuria, pneumaturia, fecaluria.  Denies fevers, chills, sweats.  No incisional complaints.  Denies chest pain, palpitations, dyspnea, lower extremity edema.      Cystogram 24: Intact urinary bladder without leak or fistula.     Colonoscopy 24 (John): Extensive diverticulosis of severe severity in the sigmoid colon  Medium hemorrhoids  Repeat in 10 years.  No specimens collected.      Former smoker (13 years ago)/No ETOH/No Illicit drug use  PMH: Pulmonary HTN, VIJAY-CPAP, HTN,   PSH: Laparoscopic Right Ovary removal,  section   No family history of CRC   Sister hx of Crohn's   Employment: Works in machine shop    Past Medical History:   Diagnosis Date    Acute exacerbation of chronic " bronchitis (Multi) 2023    COPD (chronic obstructive pulmonary disease) (Multi)     Cough 2023    Diverticulitis     GERD (gastroesophageal reflux disease)     Hyperlipidemia     Hypertension     Lung nodule     Pneumonia 1971    Procedure and treatment not carried out because of patient's decision for unspecified reasons 2021    Mammogram declined    Pulmonary hypertension (Multi)     Sleep apnea, obstructive     Wheezing on auscultation 2023       Past Surgical History:   Procedure Laterality Date    CARDIAC CATHETERIZATION       SECTION, CLASSIC      COLONOSCOPY  2022    REPEAT 5 YRS FOR POLYPS    CT GUIDED ABSCESS FLUID COLLECTION DRAINAGE  2022    CT GUIDED ABSCESS FLUID COLLECTION DRAINAGE 2022 [facility]    HAND SURGERY Left     thumb    OVARIAN CYST REMOVAL         Allergies   Allergen Reactions    Lisinopril Cough    Sulfa (Sulfonamide Antibiotics) Rash       Review of Systems   Constitutional:  Negative for activity change, appetite change, chills, fatigue, fever and unexpected weight change.   Respiratory:  Positive for wheezing. Negative for cough, choking, chest tightness and shortness of breath.    Cardiovascular:  Negative for chest pain, palpitations and leg swelling.   Gastrointestinal:  Negative for abdominal distention, abdominal pain, anal bleeding, blood in stool, constipation, diarrhea, nausea, rectal pain and vomiting.   Genitourinary:  Negative for difficulty urinating, dyspareunia, dysuria, frequency and hematuria.   Neurological:  Negative for dizziness, weakness and light-headedness.   Psychiatric/Behavioral:  Negative for agitation.        Physical Exam  Constitutional:       General: She is not in acute distress.     Appearance: Normal appearance. She is not ill-appearing.   HENT:      Head: Normocephalic.      Mouth/Throat:      Mouth: Mucous membranes are moist.   Eyes:      Extraocular Movements: Extraocular  movements intact.   Pulmonary:      Effort: Pulmonary effort is normal. No respiratory distress.   Abdominal:      General: There is no distension.      Palpations: Abdomen is soft. There is no mass.      Tenderness: There is no abdominal tenderness. There is no guarding or rebound.      Hernia: No hernia is present.      Comments: Incisions C/D/I.  Staples removed today.   Genitourinary:     Rectum: Normal.   Musculoskeletal:         General: No swelling or deformity.      Cervical back: Neck supple. No rigidity.      Right lower leg: No edema.      Left lower leg: No edema.   Skin:     General: Skin is warm.      Coloration: Skin is not jaundiced or pale.   Neurological:      General: No focal deficit present.      Mental Status: She is alert and oriented to person, place, and time. Mental status is at baseline.   Psychiatric:         Mood and Affect: Mood normal.         Behavior: Behavior normal.         Thought Content: Thought content normal.         Judgment: Judgment normal.         Assessment and Plan:   #Colovesical fistula and history of sigmoid diverticulitis S/P laparoscopic ROSIE, takedown of colovesical fistula, sigmoidectomy, mobilization splenic flexure, 29 EEA CRA, flexible sigmoidoscopy 9/23/2024  -  Expected post-operative course  -  Staples removed today in office  -  Pathology reviewed with patient  -  OK to start liberalizing diet  -  No strenuous activity or heavy lifting until 6 weeks post-op  -  Follow-up PRN    Bernabe Martinez MD   10/15/2024  12:30 PM

## 2024-10-15 ENCOUNTER — PATIENT OUTREACH (OUTPATIENT)
Dept: PRIMARY CARE | Facility: CLINIC | Age: 64
End: 2024-10-15

## 2024-10-15 ENCOUNTER — APPOINTMENT (OUTPATIENT)
Dept: SURGERY | Facility: CLINIC | Age: 64
End: 2024-10-15
Payer: COMMERCIAL

## 2024-10-15 VITALS
BODY MASS INDEX: 33.06 KG/M2 | WEIGHT: 164 LBS | HEIGHT: 59 IN | HEART RATE: 83 BPM | SYSTOLIC BLOOD PRESSURE: 126 MMHG | DIASTOLIC BLOOD PRESSURE: 80 MMHG

## 2024-10-15 DIAGNOSIS — K57.92 DIVERTICULITIS: ICD-10-CM

## 2024-10-15 DIAGNOSIS — N32.1 COLOVESICAL FISTULA: Primary | ICD-10-CM

## 2024-10-15 DIAGNOSIS — Z09 HOSPITAL DISCHARGE FOLLOW-UP: ICD-10-CM

## 2024-10-15 PROCEDURE — 99024 POSTOP FOLLOW-UP VISIT: CPT | Performed by: STUDENT IN AN ORGANIZED HEALTH CARE EDUCATION/TRAINING PROGRAM

## 2024-10-15 PROCEDURE — 3074F SYST BP LT 130 MM HG: CPT | Performed by: STUDENT IN AN ORGANIZED HEALTH CARE EDUCATION/TRAINING PROGRAM

## 2024-10-15 PROCEDURE — 3008F BODY MASS INDEX DOCD: CPT | Performed by: STUDENT IN AN ORGANIZED HEALTH CARE EDUCATION/TRAINING PROGRAM

## 2024-10-15 PROCEDURE — 3079F DIAST BP 80-89 MM HG: CPT | Performed by: STUDENT IN AN ORGANIZED HEALTH CARE EDUCATION/TRAINING PROGRAM

## 2024-10-15 ASSESSMENT — ENCOUNTER SYMPTOMS
ABDOMINAL PAIN: 0
FREQUENCY: 0
DYSURIA: 0

## 2024-10-15 NOTE — PROGRESS NOTES
Called patient to assess for any further needs they may have and to obtain an update on the patients status 14 days post discharge from the hospital. Patient did not follow up with their PCP within that time. Patient states they have no questions or concerns and are still recovering well post hospitalization. Patient states this is the best she has felt in a long time. Patient denied any new or worsening symptoms to report. Patient was encouraged to call TCM with any needs or questions that may arise and to follow up with their PCP with any changes in status. TCM to follow up 30 days post hospitalization to reassess needs.

## 2024-10-25 ENCOUNTER — APPOINTMENT (OUTPATIENT)
Dept: PULMONOLOGY | Facility: CLINIC | Age: 64
End: 2024-10-25
Payer: COMMERCIAL

## 2024-11-01 ENCOUNTER — PATIENT OUTREACH (OUTPATIENT)
Dept: PRIMARY CARE | Facility: CLINIC | Age: 64
End: 2024-11-01
Payer: COMMERCIAL

## 2024-11-01 DIAGNOSIS — Z09 HOSPITAL DISCHARGE FOLLOW-UP: ICD-10-CM

## 2024-11-05 DIAGNOSIS — J42 CHRONIC BRONCHITIS, UNSPECIFIED CHRONIC BRONCHITIS TYPE (MULTI): ICD-10-CM

## 2024-11-05 RX ORDER — BUDESONIDE, GLYCOPYRROLATE, AND FORMOTEROL FUMARATE 160; 9; 4.8 UG/1; UG/1; UG/1
AEROSOL, METERED RESPIRATORY (INHALATION)
Qty: 32.1 G | Refills: 0 | Status: SHIPPED | OUTPATIENT
Start: 2024-11-05

## 2024-11-29 ENCOUNTER — LAB (OUTPATIENT)
Dept: LAB | Facility: LAB | Age: 64
End: 2024-11-29
Payer: COMMERCIAL

## 2024-11-29 DIAGNOSIS — I10 PRIMARY HYPERTENSION: ICD-10-CM

## 2024-11-29 DIAGNOSIS — E78.00 HYPERCHOLESTEREMIA: ICD-10-CM

## 2024-11-29 DIAGNOSIS — E83.41 HYPERMAGNESEMIA: ICD-10-CM

## 2024-11-29 DIAGNOSIS — M85.80 OSTEOPENIA, UNSPECIFIED LOCATION: ICD-10-CM

## 2024-11-29 LAB
25(OH)D3 SERPL-MCNC: 41 NG/ML (ref 30–100)
ALBUMIN SERPL BCP-MCNC: 3.5 G/DL (ref 3.4–5)
ALP SERPL-CCNC: 112 U/L (ref 33–136)
ALT SERPL W P-5'-P-CCNC: 22 U/L (ref 7–45)
ANION GAP SERPL CALC-SCNC: 8 MMOL/L (ref 10–20)
AST SERPL W P-5'-P-CCNC: 26 U/L (ref 9–39)
BASOPHILS # BLD AUTO: 0.03 X10*3/UL (ref 0–0.1)
BASOPHILS NFR BLD AUTO: 0.7 %
BILIRUB SERPL-MCNC: 0.4 MG/DL (ref 0–1.2)
BUN SERPL-MCNC: 18 MG/DL (ref 6–23)
CALCIUM SERPL-MCNC: 9 MG/DL (ref 8.6–10.3)
CHLORIDE SERPL-SCNC: 105 MMOL/L (ref 98–107)
CHOLEST SERPL-MCNC: 177 MG/DL (ref 0–199)
CHOLESTEROL/HDL RATIO: 2.4
CO2 SERPL-SCNC: 30 MMOL/L (ref 21–32)
CREAT SERPL-MCNC: 0.83 MG/DL (ref 0.5–1.05)
EGFRCR SERPLBLD CKD-EPI 2021: 80 ML/MIN/1.73M*2
EOSINOPHIL # BLD AUTO: 0.22 X10*3/UL (ref 0–0.7)
EOSINOPHIL NFR BLD AUTO: 4.8 %
ERYTHROCYTE [DISTWIDTH] IN BLOOD BY AUTOMATED COUNT: 13.6 % (ref 11.5–14.5)
GLUCOSE SERPL-MCNC: 91 MG/DL (ref 74–99)
HCT VFR BLD AUTO: 38.7 % (ref 36–46)
HDLC SERPL-MCNC: 75 MG/DL
HGB BLD-MCNC: 12.3 G/DL (ref 12–16)
IMM GRANULOCYTES # BLD AUTO: 0.02 X10*3/UL (ref 0–0.7)
IMM GRANULOCYTES NFR BLD AUTO: 0.4 % (ref 0–0.9)
LDLC SERPL CALC-MCNC: 91 MG/DL
LYMPHOCYTES # BLD AUTO: 0.84 X10*3/UL (ref 1.2–4.8)
LYMPHOCYTES NFR BLD AUTO: 18.2 %
MAGNESIUM SERPL-MCNC: 1.87 MG/DL (ref 1.6–2.4)
MCH RBC QN AUTO: 30.4 PG (ref 26–34)
MCHC RBC AUTO-ENTMCNC: 31.8 G/DL (ref 32–36)
MCV RBC AUTO: 96 FL (ref 80–100)
MONOCYTES # BLD AUTO: 0.5 X10*3/UL (ref 0.1–1)
MONOCYTES NFR BLD AUTO: 10.8 %
NEUTROPHILS # BLD AUTO: 3 X10*3/UL (ref 1.2–7.7)
NEUTROPHILS NFR BLD AUTO: 65.1 %
NON HDL CHOLESTEROL: 102 MG/DL (ref 0–149)
NRBC BLD-RTO: 0 /100 WBCS (ref 0–0)
PLATELET # BLD AUTO: 288 X10*3/UL (ref 150–450)
POTASSIUM SERPL-SCNC: 3.8 MMOL/L (ref 3.5–5.3)
PROT SERPL-MCNC: 5.7 G/DL (ref 6.4–8.2)
RBC # BLD AUTO: 4.05 X10*6/UL (ref 4–5.2)
SODIUM SERPL-SCNC: 139 MMOL/L (ref 136–145)
TRIGL SERPL-MCNC: 55 MG/DL (ref 0–149)
VIT B12 SERPL-MCNC: 578 PG/ML (ref 211–911)
VLDL: 11 MG/DL (ref 0–40)
WBC # BLD AUTO: 4.6 X10*3/UL (ref 4.4–11.3)

## 2024-11-29 PROCEDURE — 82306 VITAMIN D 25 HYDROXY: CPT

## 2024-11-29 PROCEDURE — 83735 ASSAY OF MAGNESIUM: CPT

## 2024-11-29 PROCEDURE — 80053 COMPREHEN METABOLIC PANEL: CPT

## 2024-11-29 PROCEDURE — 82607 VITAMIN B-12: CPT

## 2024-11-29 PROCEDURE — 85025 COMPLETE CBC W/AUTO DIFF WBC: CPT

## 2024-11-29 PROCEDURE — 36415 COLL VENOUS BLD VENIPUNCTURE: CPT

## 2024-11-29 PROCEDURE — 80061 LIPID PANEL: CPT

## 2024-12-10 ENCOUNTER — TELEPHONE (OUTPATIENT)
Dept: PRIMARY CARE | Facility: CLINIC | Age: 64
End: 2024-12-10

## 2024-12-10 ENCOUNTER — APPOINTMENT (OUTPATIENT)
Dept: PRIMARY CARE | Facility: CLINIC | Age: 64
End: 2024-12-10
Payer: COMMERCIAL

## 2024-12-10 VITALS
BODY MASS INDEX: 31.17 KG/M2 | HEART RATE: 70 BPM | HEIGHT: 59 IN | SYSTOLIC BLOOD PRESSURE: 113 MMHG | DIASTOLIC BLOOD PRESSURE: 68 MMHG | WEIGHT: 154.6 LBS

## 2024-12-10 DIAGNOSIS — R91.1 LUNG NODULE: ICD-10-CM

## 2024-12-10 DIAGNOSIS — R23.3 EASY BRUISING: ICD-10-CM

## 2024-12-10 DIAGNOSIS — R73.02 GLUCOSE INTOLERANCE (IMPAIRED GLUCOSE TOLERANCE): ICD-10-CM

## 2024-12-10 DIAGNOSIS — K57.90 DIVERTICULOSIS: ICD-10-CM

## 2024-12-10 DIAGNOSIS — M85.80 OSTEOPENIA, UNSPECIFIED LOCATION: ICD-10-CM

## 2024-12-10 DIAGNOSIS — E78.00 HYPERCHOLESTEREMIA: ICD-10-CM

## 2024-12-10 DIAGNOSIS — Z87.891 PERSONAL HISTORY OF NICOTINE DEPENDENCE: ICD-10-CM

## 2024-12-10 DIAGNOSIS — E83.41 HYPERMAGNESEMIA: ICD-10-CM

## 2024-12-10 DIAGNOSIS — I10 PRIMARY HYPERTENSION: ICD-10-CM

## 2024-12-10 DIAGNOSIS — R79.89 LOW VITAMIN B12 LEVEL: Primary | ICD-10-CM

## 2024-12-10 PROCEDURE — 99214 OFFICE O/P EST MOD 30 MIN: CPT | Performed by: PHYSICIAN ASSISTANT

## 2024-12-10 PROCEDURE — 3078F DIAST BP <80 MM HG: CPT | Performed by: PHYSICIAN ASSISTANT

## 2024-12-10 PROCEDURE — 3074F SYST BP LT 130 MM HG: CPT | Performed by: PHYSICIAN ASSISTANT

## 2024-12-10 PROCEDURE — 1036F TOBACCO NON-USER: CPT | Performed by: PHYSICIAN ASSISTANT

## 2024-12-10 PROCEDURE — 3008F BODY MASS INDEX DOCD: CPT | Performed by: PHYSICIAN ASSISTANT

## 2024-12-10 ASSESSMENT — ENCOUNTER SYMPTOMS
ABDOMINAL PAIN: 0
FATIGUE: 0
BRUISES/BLEEDS EASILY: 0
CHILLS: 0
DIZZINESS: 0
NECK PAIN: 0
WHEEZING: 0
RHINORRHEA: 0
CONSTIPATION: 0
FREQUENCY: 0
HEADACHES: 0
PALPITATIONS: 0
TREMORS: 0
SLEEP DISTURBANCE: 0
BACK PAIN: 0
WOUND: 0
SORE THROAT: 0
FEVER: 0
NUMBNESS: 0
FLANK PAIN: 0
EYE REDNESS: 0
SINUS PAIN: 0
COUGH: 0
CHEST TIGHTNESS: 0
NAUSEA: 0
SHORTNESS OF BREATH: 0
CONFUSION: 0
EYE DISCHARGE: 0
DIARRHEA: 0
VOMITING: 0

## 2024-12-10 ASSESSMENT — PATIENT HEALTH QUESTIONNAIRE - PHQ9
1. LITTLE INTEREST OR PLEASURE IN DOING THINGS: NOT AT ALL
SUM OF ALL RESPONSES TO PHQ9 QUESTIONS 1 AND 2: 0
2. FEELING DOWN, DEPRESSED OR HOPELESS: NOT AT ALL

## 2024-12-10 NOTE — PROGRESS NOTES
"Subjective   Patient ID: Gallo Schmidt \"Georgiana\" is a 64 y.o. female who presents for Follow-up (6 MO F/U WITH LABS AND MED CHECK. 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       Preventative Testing      DEXA - dec 2023 - osteopenia   mammo - nov 2023- declines at this time   colonoscopy - 2018 - repeat in 10 years   CT chest done June 2023 - repeat in 1 year FU lung cancer screen   She is following with pulm now  as well - next visit set later this month   Pt states she quit smoking in her earlier 50s   She smoked about 1-2 ppd from age 18 to about 52 so she smoked 35 +pack year history      Fall - Neg -June 2024   PHQ2 Neg June 2024     Pt states she had a living will completed while she is in the hosp fall 2024 however patient states she would like this removed and would like to write her own wishes and have this signed and notarized and given to her kids.  Our office has reached out to legal at  to see the approp way to have this documentation removed or notified to void in her chart.  Pt is advised to follow up with our office in 1-2 weeks to make sure the process was completed correctly            Patient Active Problem List   Diagnosis    Bilateral leg edema    Chronic pain of left knee    CMC arthritis    COPD (chronic obstructive pulmonary disease) (Multi)    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    Diuretic-induced hypokalemia    GERD (gastroesophageal reflux disease)    Hypercholesteremia    Hyperkalemia    Hypermagnesemia    Hypertension    Impingement syndrome of right shoulder    Lung nodule    Medial " epicondylitis    Obstructive sleep apnea, adult    Polyarthralgia    SOB (shortness of breath) on exertion    Cervicalgia    Chronic bilateral low back pain without sciatica    Mid back pain    Fatigue    Heart palpitations    Muscle cramp    Degenerative disc disease, cervical    Degenerative disc disease, thoracic    Degenerative disc disease at L5-S1 level    Pelvic pain    Diverticulitis    Vesicosigmoidal fistula    Diverticulosis    Colovesical fistula       Review of Systems   Constitutional:  Negative for chills, fatigue and fever.   HENT:  Negative for congestion, rhinorrhea, sinus pain, sore throat and tinnitus.    Eyes:  Negative for discharge, redness and visual disturbance.   Respiratory:  Negative for cough, chest tightness, shortness of breath and wheezing.    Cardiovascular:  Negative for chest pain, palpitations and leg swelling.   Gastrointestinal:  Negative for abdominal pain, constipation, diarrhea, nausea and vomiting.   Endocrine: Negative for cold intolerance and heat intolerance.   Genitourinary:  Negative for flank pain, frequency and urgency.   Musculoskeletal:  Negative for back pain, gait problem and neck pain.   Skin:  Negative for rash and wound.   Neurological:  Negative for dizziness, tremors, syncope, numbness and headaches.   Hematological:  Does not bruise/bleed easily.   Psychiatric/Behavioral:  Negative for confusion, sleep disturbance and suicidal ideas.        Past Medical History:   Diagnosis Date    Acute exacerbation of chronic bronchitis (Multi) 11/16/2023    COPD (chronic obstructive pulmonary disease) (Multi)     Cough 11/16/2023    Diverticulitis     GERD (gastroesophageal reflux disease) 1996    Hyperlipidemia     Hypertension     Lung nodule 2020    Pneumonia 1971    Procedure and treatment not carried out because of patient's decision for unspecified reasons 12/22/2021    Mammogram declined    Pulmonary hypertension (Multi)     Sleep apnea, obstructive 2020     Wheezing on auscultation 2023       Past Surgical History:   Procedure Laterality Date    CARDIAC CATHETERIZATION       SECTION, CLASSIC      COLON SURGERY  2024    SIGMOID RESECTION    COLONOSCOPY  2022    REPEAT 5 YRS FOR POLYPS    CT GUIDED ABSCESS FLUID COLLECTION DRAINAGE  2022    CT GUIDED ABSCESS FLUID COLLECTION DRAINAGE 2022 [facility]    HAND SURGERY Left     thumb    OVARIAN CYST REMOVAL         Family History   Problem Relation Name Age of Onset    Hypertension Mother Anita márquez     Stroke Father Rory márquez     COPD Father Rory márquez     Hypertension Father Rory márquez     Other (PERIPHERAL ARTERIAL DISEASE) Father Rory márquez     Thyroid disease Father Rory márquez     Heart attack Brother      Stomach cancer Maternal Grandmother      Temple's disease Paternal Grandmother         Social History     Tobacco Use    Smoking status: Former     Current packs/day: 0.00     Average packs/day: 1.5 packs/day for 33.0 years (49.5 ttl pk-yrs)     Types: Cigarettes     Start date: 1978     Quit date: 2011     Years since quittin.9     Passive exposure: Current    Smokeless tobacco: Never   Vaping Use    Vaping status: Never Used   Substance Use Topics    Alcohol use: Never    Drug use: Never       Allergies   Allergen Reactions    Lisinopril Cough    Sulfa (Sulfonamide Antibiotics) Rash       Current Outpatient Medications   Medication Sig Dispense Refill    acetaminophen (Tylenol) 325 mg tablet Take 2 tablets (650 mg) by mouth every 6 hours if needed for mild pain (1 - 3).      albuterol 90 mcg/actuation inhaler Inhale 2 puffs every 6 hours if needed for shortness of breath.      aspirin 81 mg chewable tablet CHEW AND SWALLOW 1 TABLET BY MOUTH ONCE DAILY 90 tablet 3    atorvastatin (Lipitor) 80 mg tablet Take 1 tablet (80 mg) by mouth once daily. 100 tablet 3    Breztri Aerosphere 160-9-4.8 mcg/actuation HFA aerosol inhaler 2 puffs BID RINSE MOUTH WITH WATER  "AFTER 32.1 g 0    cholecalciferol (Vitamin D-3) 25 MCG (1000 UT) tablet Take 1 tablet (25 mcg) by mouth once daily.      isosorbide mononitrate ER (Imdur) 30 mg 24 hr tablet Take 1 tablet (30 mg) by mouth once daily. 90 tablet 3    L.acid/L.casei/B.bif/B.shereen/FOS (PROBIOTIC BLEND ORAL) Take 1 each by mouth once daily.      lactase (Lactaid) 3,000 unit tablet Take 1 tablet (3,000 Units) by mouth once daily as needed.      losartan (Cozaar) 50 mg tablet Take 1 tablet (50 mg) by mouth once daily. 30 tablet 11    metoprolol succinate XL (Toprol-XL) 25 mg 24 hr tablet TAKE 1 TABLET (25 MG) BY MOUTH ONCE DAILY. 90 tablet 3    omeprazole OTC (PriLOSEC OTC) 20 mg EC tablet Take 1 tablet (20 mg) by mouth once daily as needed.      potassium chloride (Klor-Con) 20 mEq packet DISSOLVE 1 PACKET (20 MEQ) IN FLUID AND DRINK BY MOUTH ONCE DAILY AS DIRECTED 90 packet 3    triamterene-hydrochlorothiazid (Maxzide-25) 37.5-25 mg tablet TAKE 1 TABLET BY MOUTH ONCE DAILY. 90 tablet 3    azelastine (Astelin) 137 mcg (0.1 %) nasal spray Administer 2 sprays into each nostril 2 times a day for 15 days. Use in each nostril as directed (Patient not taking: Reported on 9/9/2024) 30 mL 0     No current facility-administered medications for this visit.       Objective   /68   Pulse 70   Ht 1.499 m (4' 11\")   Wt 70.1 kg (154 lb 9.6 oz)   BMI 31.23 kg/m²     Physical Exam  Vitals reviewed.   Constitutional:       Appearance: Normal appearance. She is obese.   HENT:      Head: Normocephalic.      Right Ear: External ear normal.      Left Ear: External ear normal.      Nose: Nose normal. No congestion or rhinorrhea.      Mouth/Throat:      Mouth: Mucous membranes are moist.   Eyes:      Extraocular Movements: Extraocular movements intact.      Conjunctiva/sclera: Conjunctivae normal.      Pupils: Pupils are equal, round, and reactive to light.   Cardiovascular:      Rate and Rhythm: Normal rate and regular rhythm.      Pulses: Normal " pulses.   Pulmonary:      Effort: Pulmonary effort is normal.      Breath sounds: Normal breath sounds.   Abdominal:      General: Bowel sounds are normal.      Palpations: Abdomen is soft.      Tenderness: There is no abdominal tenderness. There is no right CVA tenderness or left CVA tenderness.   Musculoskeletal:         General: No tenderness. Normal range of motion.      Cervical back: Normal range of motion and neck supple. No tenderness.   Skin:     General: Skin is warm and dry.   Neurological:      General: No focal deficit present.      Mental Status: She is alert and oriented to person, place, and time.   Psychiatric:         Mood and Affect: Mood normal.         Behavior: Behavior normal.       Testing   Component      Latest Ref Rn 11/29/2024   WBC      4.4 - 11.3 x10*3/uL 4.6    nRBC      0.0 - 0.0 /100 WBCs 0.0    RBC      4.00 - 5.20 x10*6/uL 4.05    HEMOGLOBIN      12.0 - 16.0 g/dL 12.3    HEMATOCRIT      36.0 - 46.0 % 38.7    MCV      80 - 100 fL 96    MCH      26.0 - 34.0 pg 30.4    MCHC      32.0 - 36.0 g/dL 31.8 (L)    RED CELL DISTRIBUTION WIDTH      11.5 - 14.5 % 13.6    Platelets      150 - 450 x10*3/uL 288    Neutrophils %      40.0 - 80.0 % 65.1    Immature Granulocytes %, Automated      0.0 - 0.9 % 0.4    Lymphocytes %      13.0 - 44.0 % 18.2    Monocytes %      2.0 - 10.0 % 10.8    Eosinophils %      0.0 - 6.0 % 4.8    Basophils %      0.0 - 2.0 % 0.7    Neutrophils Absolute      1.20 - 7.70 x10*3/uL 3.00    Immature Granulocytes Absolute, Automated      0.00 - 0.70 x10*3/uL 0.02    Lymphocytes Absolute      1.20 - 4.80 x10*3/uL 0.84 (L)    Monocytes Absolute      0.10 - 1.00 x10*3/uL 0.50    Eosinophils Absolute      0.00 - 0.70 x10*3/uL 0.22    Basophils Absolute      0.00 - 0.10 x10*3/uL 0.03    GLUCOSE      74 - 99 mg/dL 91    SODIUM      136 - 145 mmol/L 139    POTASSIUM      3.5 - 5.3 mmol/L 3.8    CHLORIDE      98 - 107 mmol/L 105    Bicarbonate      21 - 32 mmol/L 30    Anion  Gap      10 - 20 mmol/L 8 (L)    Blood Urea Nitrogen      6 - 23 mg/dL 18    Creatinine      0.50 - 1.05 mg/dL 0.83    EGFR      >60 mL/min/1.73m*2 80    Calcium      8.6 - 10.3 mg/dL 9.0    Albumin      3.4 - 5.0 g/dL 3.5    Alkaline Phosphatase      33 - 136 U/L 112    Total Protein      6.4 - 8.2 g/dL 5.7 (L)    AST      9 - 39 U/L 26    Bilirubin Total      0.0 - 1.2 mg/dL 0.4    ALT      7 - 45 U/L 22    CHOLESTEROL      0 - 199 mg/dL 177    HDL CHOLESTEROL      mg/dL 75.0    Cholesterol/HDL Ratio 2.4    LDL Calculated      <=99 mg/dL 91    VLDL      0 - 40 mg/dL 11    TRIGLYCERIDES      0 - 149 mg/dL 55    Non HDL Cholesterol      0 - 149 mg/dL 102    MAGNESIUM      1.60 - 2.40 mg/dL 1.87    Vitamin B12      211 - 911 pg/mL 578    Vitamin D, 25-Hydroxy, Total      30 - 100 ng/mL 41         Impression    MDM    1) COMPLEXITY: MORE THAN 1 STABLE CHRONIC CONDITION ADDRESSED  2)DATA: TESTS INTERPRETED AND OR ORDERED, TOOK INDEPENDENT HISTORY OR RECORDS REVIEWED  3)RISK: MODERATE RISK DUE TO NATURE OF MEDICAL CONDITIONS/COMORBIDITY OR MEDICATIONS ORDERED OR SURGICAL OR PROCEDURE REFERRAL, .       Reviewed labs and Testing on file   Patient to follow diet low in cholesterol, fat, and sodium.    Patient is advised to increase Exercise.  Patient is recommended to lose weight.  Reviewed Meds and discussed common side effects  Continue as directed   Patient is strongly advised to be compliant with recommendations.    Return to Clinic sooner if needed.  Patient denies further questions/concerns at this time     Assessment/Plan   Problem List Items Addressed This Visit             ICD-10-CM    Hypercholesteremia E78.00    Relevant Orders    CBC and Auto Differential    Comprehensive Metabolic Panel    Hemoglobin A1C    Lipid Panel    Thyroid Stimulating Hormone    Thyroxine, Free    Iron and TIBC    Ferritin    Magnesium    Vitamin B12    Vitamin D 25-Hydroxy,Total (for eval of Vitamin D levels)    Hypermagnesemia E83.41     Relevant Orders    Magnesium    Hypertension I10    Lung nodule R91.1    Relevant Orders    CT lung screening low dose    Diverticulosis K57.90     Other Visit Diagnoses         Codes    Low vitamin B12 level    -  Primary R79.89    Relevant Orders    Vitamin B12    Glucose intolerance (impaired glucose tolerance)     R73.02    Relevant Orders    CBC and Auto Differential    Comprehensive Metabolic Panel    Hemoglobin A1C    Lipid Panel    Thyroid Stimulating Hormone    Thyroxine, Free    Iron and TIBC    Ferritin    Magnesium    Vitamin B12    Vitamin D 25-Hydroxy,Total (for eval of Vitamin D levels)    Osteopenia, unspecified location     M85.80    Relevant Orders    Vitamin D 25-Hydroxy,Total (for eval of Vitamin D levels)    Easy bruising     R23.3    Relevant Orders    CBC and Auto Differential    Iron and TIBC    Ferritin    Personal history of nicotine dependence     Z87.891    Relevant Orders    CT lung screening low dose          FU in 6 mo with labs at [facility] fasting and med check    Low dose lung CT screen 1 year follow up

## 2024-12-27 ENCOUNTER — HOSPITAL ENCOUNTER (OUTPATIENT)
Dept: RADIOLOGY | Facility: HOSPITAL | Age: 64
Discharge: HOME | End: 2024-12-27
Payer: COMMERCIAL

## 2024-12-27 DIAGNOSIS — Z87.891 PERSONAL HISTORY OF NICOTINE DEPENDENCE: ICD-10-CM

## 2024-12-27 DIAGNOSIS — R91.1 LUNG NODULE: ICD-10-CM

## 2024-12-27 PROCEDURE — 71271 CT THORAX LUNG CANCER SCR C-: CPT

## 2025-01-03 ENCOUNTER — PATIENT OUTREACH (OUTPATIENT)
Dept: PRIMARY CARE | Facility: CLINIC | Age: 65
End: 2025-01-03
Payer: COMMERCIAL

## 2025-01-03 DIAGNOSIS — Z09 HOSPITAL DISCHARGE FOLLOW-UP: ICD-10-CM

## 2025-01-03 NOTE — PROGRESS NOTES
[facility] communication for 90 day TCM post discharge outreach.     Patient has met target of no readmission for 90 days post hospital discharge and is graduated from Transitional Care Management program at this time.

## 2025-01-08 DIAGNOSIS — J42 CHRONIC BRONCHITIS, UNSPECIFIED CHRONIC BRONCHITIS TYPE (MULTI): ICD-10-CM

## 2025-01-09 RX ORDER — BUDESONIDE, GLYCOPYRROLATE, AND FORMOTEROL FUMARATE 160; 9; 4.8 UG/1; UG/1; UG/1
AEROSOL, METERED RESPIRATORY (INHALATION)
Qty: 32.1 G | Refills: 0 | Status: SHIPPED | OUTPATIENT
Start: 2025-01-09

## 2025-01-13 ENCOUNTER — APPOINTMENT (OUTPATIENT)
Dept: PULMONOLOGY | Facility: CLINIC | Age: 65
End: 2025-01-13
Payer: COMMERCIAL

## 2025-01-17 ENCOUNTER — APPOINTMENT (OUTPATIENT)
Dept: PULMONOLOGY | Facility: CLINIC | Age: 65
End: 2025-01-17
Payer: COMMERCIAL

## 2025-01-17 VITALS
HEART RATE: 70 BPM | BODY MASS INDEX: 27.49 KG/M2 | WEIGHT: 161 LBS | HEIGHT: 64 IN | TEMPERATURE: 97.7 F | DIASTOLIC BLOOD PRESSURE: 81 MMHG | OXYGEN SATURATION: 93 % | SYSTOLIC BLOOD PRESSURE: 135 MMHG

## 2025-01-17 DIAGNOSIS — R06.09 DYSPNEA ON EXERTION: ICD-10-CM

## 2025-01-17 DIAGNOSIS — J41.0 SIMPLE CHRONIC BRONCHITIS (MULTI): Primary | ICD-10-CM

## 2025-01-17 PROCEDURE — 99214 OFFICE O/P EST MOD 30 MIN: CPT | Performed by: INTERNAL MEDICINE

## 2025-01-17 PROCEDURE — 3075F SYST BP GE 130 - 139MM HG: CPT | Performed by: INTERNAL MEDICINE

## 2025-01-17 PROCEDURE — 1036F TOBACCO NON-USER: CPT | Performed by: INTERNAL MEDICINE

## 2025-01-17 PROCEDURE — 3008F BODY MASS INDEX DOCD: CPT | Performed by: INTERNAL MEDICINE

## 2025-01-17 PROCEDURE — 3079F DIAST BP 80-89 MM HG: CPT | Performed by: INTERNAL MEDICINE

## 2025-01-17 NOTE — PROGRESS NOTES
"Subjective   Patient ID: Gallo Schmidt \"Ernie" is a 64 y.o. female who presents for No chief complaint on file..  HPI  This patient was seen today on a 4-month follow-up visit for her chronic bronchitis and dyspnea on exertion.  Patient reports she had a colon resection in September of last year because of a fistula.  She reports that her breathing however has been doing okay since then.  She is on Breztri inhaler at 2 puffs twice a day and albuterol inhaler 2 puffs 4 times daily as needed shortness of breath.  She denies any cough currently or sputum production.  She has no hemoptysis and no wheezing.  She does have shortness of breath with exertion.  Review of Systems  Patient denies having any fever, chills, rhinitis or sore throat.  She has not smoking.  Objective   Physical Exam  HEENT, no inflammation on examination of the oropharynx.  Pulmonary, decreased breath sounds bilaterally.  Cardio, heart sounds are regular rate and rhythm.  Extremities, no cyanosis or clubbing and +1 pretibial edema.  Assessment/Plan        Russ[place]:  1.  Chronic bronchitis.  2.  Dyspnea on exertion.  Recommendations:  1.  Continue with her current bronchodilator therapy.  2.  Follow-up with the patient in 4 months      This note was transcribed using the Dragon Dictation system.  There may be grammatical, punctuation, or verbiage errors that occur with voice recognition programs.    Obdulio Chakraborty,  01/17/25 12:01 PM   "

## 2025-01-21 DIAGNOSIS — R07.9 CHEST PAIN, UNSPECIFIED TYPE: ICD-10-CM

## 2025-01-22 RX ORDER — ISOSORBIDE MONONITRATE 30 MG/1
30 TABLET, EXTENDED RELEASE ORAL DAILY
Qty: 90 TABLET | Refills: 3 | Status: SHIPPED | OUTPATIENT
Start: 2025-01-22 | End: 2026-01-22

## 2025-01-23 DIAGNOSIS — R60.0 BILATERAL LEG EDEMA: ICD-10-CM

## 2025-01-23 DIAGNOSIS — Z00.00 HEALTHCARE MAINTENANCE: ICD-10-CM

## 2025-01-23 RX ORDER — TRIAMTERENE/HYDROCHLOROTHIAZID 37.5-25 MG
1 TABLET ORAL DAILY
Qty: 90 TABLET | Refills: 3 | Status: SHIPPED | OUTPATIENT
Start: 2025-01-23

## 2025-01-23 RX ORDER — NAPROXEN SODIUM 220 MG/1
81 TABLET, FILM COATED ORAL DAILY
Qty: 90 TABLET | Refills: 3 | Status: SHIPPED | OUTPATIENT
Start: 2025-01-23

## 2025-01-27 DIAGNOSIS — I10 PRIMARY HYPERTENSION: ICD-10-CM

## 2025-01-27 RX ORDER — METOPROLOL SUCCINATE 25 MG/1
25 TABLET, EXTENDED RELEASE ORAL DAILY
Qty: 90 TABLET | Refills: 3 | Status: SHIPPED | OUTPATIENT
Start: 2025-01-27

## 2025-01-28 DIAGNOSIS — I10 PRIMARY HYPERTENSION: ICD-10-CM

## 2025-01-28 RX ORDER — LOSARTAN POTASSIUM 25 MG/1
25 TABLET ORAL DAILY
Qty: 90 TABLET | Refills: 3 | Status: SHIPPED | OUTPATIENT
Start: 2025-01-28

## 2025-02-06 DIAGNOSIS — E78.00 HYPERCHOLESTEREMIA: ICD-10-CM

## 2025-02-06 RX ORDER — ATORVASTATIN CALCIUM 80 MG/1
80 TABLET, FILM COATED ORAL DAILY
Qty: 90 TABLET | Refills: 1 | Status: SHIPPED | OUTPATIENT
Start: 2025-02-06 | End: 2026-03-13

## 2025-02-09 DIAGNOSIS — E87.5 HYPERKALEMIA: ICD-10-CM

## 2025-02-10 RX ORDER — POTASSIUM CHLORIDE 1.5 G/1
POWDER, FOR SOLUTION ORAL
Qty: 30 PACKET | Refills: 11 | Status: SHIPPED | OUTPATIENT
Start: 2025-02-10

## 2025-02-11 ENCOUNTER — APPOINTMENT (OUTPATIENT)
Dept: PRIMARY CARE | Facility: CLINIC | Age: 65
End: 2025-02-11
Payer: COMMERCIAL

## 2025-03-17 DIAGNOSIS — J42 CHRONIC BRONCHITIS, UNSPECIFIED CHRONIC BRONCHITIS TYPE (MULTI): ICD-10-CM

## 2025-03-25 RX ORDER — BUDESONIDE, GLYCOPYRROLATE, AND FORMOTEROL FUMARATE 160; 9; 4.8 UG/1; UG/1; UG/1
AEROSOL, METERED RESPIRATORY (INHALATION)
Qty: 32.1 G | Refills: 0 | Status: SHIPPED | OUTPATIENT
Start: 2025-03-25

## 2025-05-16 ENCOUNTER — APPOINTMENT (OUTPATIENT)
Dept: PULMONOLOGY | Facility: CLINIC | Age: 65
End: 2025-05-16
Payer: COMMERCIAL

## 2025-05-16 VITALS
HEART RATE: 67 BPM | WEIGHT: 168.8 LBS | BODY MASS INDEX: 28.82 KG/M2 | OXYGEN SATURATION: 94 % | SYSTOLIC BLOOD PRESSURE: 107 MMHG | HEIGHT: 64 IN | TEMPERATURE: 97.3 F | DIASTOLIC BLOOD PRESSURE: 70 MMHG

## 2025-05-16 DIAGNOSIS — R06.09 DYSPNEA ON EXERTION: ICD-10-CM

## 2025-05-16 DIAGNOSIS — J41.8 MIXED SIMPLE AND MUCOPURULENT CHRONIC BRONCHITIS (MULTI): Primary | ICD-10-CM

## 2025-05-16 PROCEDURE — 3008F BODY MASS INDEX DOCD: CPT | Performed by: INTERNAL MEDICINE

## 2025-05-16 PROCEDURE — 3074F SYST BP LT 130 MM HG: CPT | Performed by: INTERNAL MEDICINE

## 2025-05-16 PROCEDURE — 3078F DIAST BP <80 MM HG: CPT | Performed by: INTERNAL MEDICINE

## 2025-05-16 PROCEDURE — 99214 OFFICE O/P EST MOD 30 MIN: CPT | Performed by: INTERNAL MEDICINE

## 2025-05-16 PROCEDURE — 1036F TOBACCO NON-USER: CPT | Performed by: INTERNAL MEDICINE

## 2025-05-16 ASSESSMENT — COPD QUESTIONNAIRES
QUESTION5_HOMEACTIVITIES: 1
QUESTION7_SLEEPQUALITY: 3
QUESTION2_CHESTPHLEGM: 2
QUESTION6_LEAVINGHOUSE: 0 - I AM CONFIDENT LEAVING MY HOME DESPITE MY LUNG CONDITION
QUESTION8_ENERGYLEVEL: 2
CAT_TOTALSCORE: 17
QUESTION4_WALKINCLINE: 4
QUESTION3_CHESTTIGHTNESS: 3
QUESTION1_COUGHFREQUENCY: 2

## 2025-05-16 NOTE — PROGRESS NOTES
"Subjective   Patient ID: Gallo Schmidt \"Ernie" is a 65 y.o. female who presents for a 4-month follow-up visit.  HPI  She has a history of chronic bronchitis and dyspnea on exertion.  She uses a albuterol inhaler 2-3 times per day and Breztri inhaler twice a day.  She is also on CPAP therapy and this is being monitored by Tanvi Holguin.  Review of Systems  She has some mild cough and scant sputum production.  She denies any hemoptysis or wheezing.  She denies shortness of breath with usual daily activities at home.  Patient denies any fever, chills, rhinitis or sore throat and is not smoking.  Objective   Physical Exam  Examination of the oropharynx did not show any inflammation.  Respiratory, decreased breath sounds but clear to auscultation.  Cardio, heart sounds are regular rate and rhythm.  Extremities, no cyanosis or clubbing but does have distal lower extremity edema.  Assessment/Plan        Impressions:  1.  Chronic bronchitis.  2.  Dyspnea on exertion.  Recommendations:  1.  Follow-up with the patient in 4 months.  2.  Continue with her present bronchodilator therapy.      This note was transcribed using the Dragon Dictation system.  There may be grammatical, punctuation, or verbiage errors that occur with voice recognition programs.    Obdulio Chakraborty,  05/16/25 12:16 PM   "

## 2025-05-19 DIAGNOSIS — J42 CHRONIC BRONCHITIS, UNSPECIFIED CHRONIC BRONCHITIS TYPE (MULTI): ICD-10-CM

## 2025-05-19 RX ORDER — BUDESONIDE, GLYCOPYRROLATE, AND FORMOTEROL FUMARATE 160; 9; 4.8 UG/1; UG/1; UG/1
AEROSOL, METERED RESPIRATORY (INHALATION)
Qty: 32.1 G | Refills: 1 | Status: SHIPPED | OUTPATIENT
Start: 2025-05-19 | End: 2025-05-20 | Stop reason: SDUPTHER

## 2025-05-21 RX ORDER — BUDESONIDE, GLYCOPYRROLATE, AND FORMOTEROL FUMARATE 160; 9; 4.8 UG/1; UG/1; UG/1
AEROSOL, METERED RESPIRATORY (INHALATION)
Qty: 32.1 G | Refills: 1 | Status: SHIPPED | OUTPATIENT
Start: 2025-05-21

## 2025-06-05 LAB
25(OH)D3+25(OH)D2 SERPL-MCNC: 39 NG/ML (ref 30–100)
ALBUMIN SERPL-MCNC: 3.8 G/DL (ref 3.6–5.1)
ALP SERPL-CCNC: 109 U/L (ref 37–153)
ALT SERPL-CCNC: 28 U/L (ref 6–29)
ANION GAP SERPL CALCULATED.4IONS-SCNC: 7 MMOL/L (CALC) (ref 7–17)
AST SERPL-CCNC: 48 U/L (ref 10–35)
BASOPHILS # BLD AUTO: 39 CELLS/UL (ref 0–200)
BASOPHILS NFR BLD AUTO: 0.7 %
BILIRUB SERPL-MCNC: 0.6 MG/DL (ref 0.2–1.2)
BUN SERPL-MCNC: 19 MG/DL (ref 7–25)
CALCIUM SERPL-MCNC: 9.3 MG/DL (ref 8.6–10.4)
CHLORIDE SERPL-SCNC: 104 MMOL/L (ref 98–110)
CHOLEST SERPL-MCNC: 183 MG/DL
CHOLEST/HDLC SERPL: 2.2 (CALC)
CO2 SERPL-SCNC: 28 MMOL/L (ref 20–32)
CREAT SERPL-MCNC: 0.93 MG/DL (ref 0.5–1.05)
EGFRCR SERPLBLD CKD-EPI 2021: 69 ML/MIN/1.73M2
EOSINOPHIL # BLD AUTO: 171 CELLS/UL (ref 15–500)
EOSINOPHIL NFR BLD AUTO: 3.1 %
ERYTHROCYTE [DISTWIDTH] IN BLOOD BY AUTOMATED COUNT: 13.9 % (ref 11–15)
EST. AVERAGE GLUCOSE BLD GHB EST-MCNC: 120 MG/DL
EST. AVERAGE GLUCOSE BLD GHB EST-SCNC: 6.6 MMOL/L
FERRITIN SERPL-MCNC: 30 NG/ML (ref 16–288)
GLUCOSE SERPL-MCNC: 88 MG/DL (ref 65–99)
HBA1C MFR BLD: 5.8 %
HCT VFR BLD AUTO: 42.1 % (ref 35–45)
HDLC SERPL-MCNC: 84 MG/DL
HGB BLD-MCNC: 13.3 G/DL (ref 11.7–15.5)
IRON SATN MFR SERPL: 25 % (CALC) (ref 16–45)
IRON SERPL-MCNC: 81 MCG/DL (ref 45–160)
LDLC SERPL CALC-MCNC: 85 MG/DL (CALC)
LYMPHOCYTES # BLD AUTO: 1150 CELLS/UL (ref 850–3900)
LYMPHOCYTES NFR BLD AUTO: 20.9 %
MAGNESIUM SERPL-MCNC: 2 MG/DL (ref 1.5–2.5)
MCH RBC QN AUTO: 30.5 PG (ref 27–33)
MCHC RBC AUTO-ENTMCNC: 31.6 G/DL (ref 32–36)
MCV RBC AUTO: 96.6 FL (ref 80–100)
MONOCYTES # BLD AUTO: 374 CELLS/UL (ref 200–950)
MONOCYTES NFR BLD AUTO: 6.8 %
NEUTROPHILS # BLD AUTO: 3768 CELLS/UL (ref 1500–7800)
NEUTROPHILS NFR BLD AUTO: 68.5 %
NONHDLC SERPL-MCNC: 99 MG/DL (CALC)
PLATELET # BLD AUTO: 272 THOUSAND/UL (ref 140–400)
PMV BLD REES-ECKER: 10.1 FL (ref 7.5–12.5)
POTASSIUM SERPL-SCNC: 4.1 MMOL/L (ref 3.5–5.3)
PROT SERPL-MCNC: 6 G/DL (ref 6.1–8.1)
RBC # BLD AUTO: 4.36 MILLION/UL (ref 3.8–5.1)
SODIUM SERPL-SCNC: 139 MMOL/L (ref 135–146)
T4 FREE SERPL-MCNC: 1.2 NG/DL (ref 0.8–1.8)
TIBC SERPL-MCNC: 329 MCG/DL (CALC) (ref 250–450)
TRIGL SERPL-MCNC: 56 MG/DL
TSH SERPL-ACNC: 2.44 MIU/L (ref 0.4–4.5)
VIT B12 SERPL-MCNC: 377 PG/ML (ref 200–1100)
WBC # BLD AUTO: 5.5 THOUSAND/UL (ref 3.8–10.8)

## 2025-06-11 ENCOUNTER — APPOINTMENT (OUTPATIENT)
Dept: PRIMARY CARE | Facility: CLINIC | Age: 65
End: 2025-06-11
Payer: COMMERCIAL

## 2025-06-11 ENCOUNTER — TELEPHONE (OUTPATIENT)
Dept: PRIMARY CARE | Facility: CLINIC | Age: 65
End: 2025-06-11

## 2025-06-11 VITALS
HEIGHT: 64 IN | BODY MASS INDEX: 28.34 KG/M2 | DIASTOLIC BLOOD PRESSURE: 87 MMHG | WEIGHT: 166 LBS | HEART RATE: 64 BPM | SYSTOLIC BLOOD PRESSURE: 136 MMHG

## 2025-06-11 DIAGNOSIS — R79.89 LOW VITAMIN B12 LEVEL: ICD-10-CM

## 2025-06-11 DIAGNOSIS — R73.02 GLUCOSE INTOLERANCE (IMPAIRED GLUCOSE TOLERANCE): ICD-10-CM

## 2025-06-11 DIAGNOSIS — G89.29 CHRONIC BILATERAL LOW BACK PAIN WITHOUT SCIATICA: Primary | ICD-10-CM

## 2025-06-11 DIAGNOSIS — E83.41 HYPERMAGNESEMIA: ICD-10-CM

## 2025-06-11 DIAGNOSIS — Z12.31 ENCOUNTER FOR SCREENING MAMMOGRAM FOR BREAST CANCER: ICD-10-CM

## 2025-06-11 DIAGNOSIS — M25.562 CHRONIC PAIN OF LEFT KNEE: ICD-10-CM

## 2025-06-11 DIAGNOSIS — M85.80 OSTEOPENIA, UNSPECIFIED LOCATION: ICD-10-CM

## 2025-06-11 DIAGNOSIS — I10 PRIMARY HYPERTENSION: ICD-10-CM

## 2025-06-11 DIAGNOSIS — K21.9 GASTROESOPHAGEAL REFLUX DISEASE WITHOUT ESOPHAGITIS: ICD-10-CM

## 2025-06-11 DIAGNOSIS — G89.29 CHRONIC PAIN OF LEFT KNEE: ICD-10-CM

## 2025-06-11 DIAGNOSIS — E87.6 HYPOKALEMIA: ICD-10-CM

## 2025-06-11 DIAGNOSIS — T50.2X5A DIURETIC-INDUCED HYPOKALEMIA: ICD-10-CM

## 2025-06-11 DIAGNOSIS — E87.6 DIURETIC-INDUCED HYPOKALEMIA: ICD-10-CM

## 2025-06-11 DIAGNOSIS — M25.50 POLYARTHRALGIA: ICD-10-CM

## 2025-06-11 DIAGNOSIS — E78.00 HYPERCHOLESTEREMIA: ICD-10-CM

## 2025-06-11 DIAGNOSIS — M54.50 CHRONIC BILATERAL LOW BACK PAIN WITHOUT SCIATICA: Primary | ICD-10-CM

## 2025-06-11 PROBLEM — N32.1 COLOVESICAL FISTULA: Status: RESOLVED | Noted: 2024-08-20 | Resolved: 2025-06-11

## 2025-06-11 PROBLEM — N32.1: Status: RESOLVED | Noted: 2024-07-11 | Resolved: 2025-06-11

## 2025-06-11 PROCEDURE — 3079F DIAST BP 80-89 MM HG: CPT | Performed by: PHYSICIAN ASSISTANT

## 2025-06-11 PROCEDURE — 3075F SYST BP GE 130 - 139MM HG: CPT | Performed by: PHYSICIAN ASSISTANT

## 2025-06-11 PROCEDURE — 1036F TOBACCO NON-USER: CPT | Performed by: PHYSICIAN ASSISTANT

## 2025-06-11 PROCEDURE — 99214 OFFICE O/P EST MOD 30 MIN: CPT | Performed by: PHYSICIAN ASSISTANT

## 2025-06-11 PROCEDURE — 3008F BODY MASS INDEX DOCD: CPT | Performed by: PHYSICIAN ASSISTANT

## 2025-06-11 RX ORDER — POTASSIUM CHLORIDE 20 MEQ/1
20 TABLET, EXTENDED RELEASE ORAL DAILY
Qty: 90 TABLET | Refills: 1 | Status: SHIPPED | OUTPATIENT
Start: 2025-06-11

## 2025-06-11 ASSESSMENT — ENCOUNTER SYMPTOMS
WOUND: 0
PALPITATIONS: 0
SHORTNESS OF BREATH: 0
COUGH: 0
SINUS PAIN: 0
WHEEZING: 0
ARTHRALGIAS: 1
VOMITING: 0
NUMBNESS: 0
FREQUENCY: 0
EYE DISCHARGE: 0
SLEEP DISTURBANCE: 0
ABDOMINAL PAIN: 0
HEADACHES: 0
EYE REDNESS: 0
BACK PAIN: 1
CONFUSION: 0
TREMORS: 0
FATIGUE: 1
NAUSEA: 0
CHEST TIGHTNESS: 0
FLANK PAIN: 0
DIZZINESS: 0
SORE THROAT: 0
CHILLS: 0
DIARRHEA: 0
NECK PAIN: 0
BRUISES/BLEEDS EASILY: 0
CONSTIPATION: 0
FEVER: 0
RHINORRHEA: 0

## 2025-06-11 ASSESSMENT — PATIENT HEALTH QUESTIONNAIRE - PHQ9
SUM OF ALL RESPONSES TO PHQ9 QUESTIONS 1 AND 2: 0
2. FEELING DOWN, DEPRESSED OR HOPELESS: NOT AT ALL
1. LITTLE INTEREST OR PLEASURE IN DOING THINGS: NOT AT ALL

## 2025-06-11 NOTE — PROGRESS NOTES
"Subjective   Patient ID: Gallo Schmidt \"Georgiana\" is a 65 y.o. female who presents for Follow-up (6 MONTH F/U WITH LABS. C/O BACK PAIN THAT RADIATES DOWN BOTH LEGS + KNEES. TAKING OTC IBUPROFEN WITH LITTLE RELIEF. )    HPI  Labs     Back pain -sometimes  radiating down bilat legs to knees  Some pain is just from knees down    Taking ibuprofen but little relief   Suggest updated xray and consider PT or pain clinic referral     GI symptom I suspect from NSAID use.  We discussed hold NSAID and use tylenol instead.  Cont PPI/tums and pending symptoms consider EGD as discussed - pt declines but will call If she changes her mind      Med check   allergies - taking zyrtec and flonase  HTN - stable on meds   polyarthralgia/OA -hx following with ortho -dr Hoover   contract DERM - using topical but cont to struggle bc of work exposure with the oil   COPD - inhalers - following with pulm   VIJAY- on CPAP   Chronic cystitis /hematuria and risks for vesico - colonic fistula -  Hx of sigmoid resection    hypokalemia - would like powder supplement changed to tab      Preventative Testing      DEXA - dec 2023 - osteopenia   mammo - nov 2023-  colonoscopy - 2018 - repeat in 10 years     CT chest done June 2023 - repeat done dec 2024 -repeat in 1 year  FShe is following with pulm now  as well   Pt states she quit smoking in her earlier 50s   She smoked about 1-2 ppd from age 18 to about 52 so she smoked 35 +pack year history      Fall - Neg -  PHQ2 Neg June 2025       Problem List[1]    Review of Systems   Constitutional:  Positive for fatigue. Negative for chills and fever.   HENT:  Negative for congestion, rhinorrhea, sinus pain, sore throat and tinnitus.    Eyes:  Negative for discharge, redness and visual disturbance.   Respiratory:  Negative for cough, chest tightness, shortness of breath and wheezing.    Cardiovascular:  Negative for chest pain, palpitations and leg swelling.   Gastrointestinal:  Negative for abdominal pain, " "constipation, diarrhea, nausea and vomiting.   Endocrine: Negative for cold intolerance and heat intolerance.   Genitourinary:  Negative for flank pain, frequency and urgency.   Musculoskeletal:  Positive for arthralgias and back pain. Negative for gait problem and neck pain.   Skin:  Negative for rash and wound.   Neurological:  Negative for dizziness, tremors, syncope, numbness and headaches.   Hematological:  Does not bruise/bleed easily.   Psychiatric/Behavioral:  Negative for confusion, sleep disturbance and suicidal ideas.        Medical History[2]    Surgical History[3]    Family History[4]    Social History[5]    Allergies[6]    Current Medications[7]    Objective   /87   Pulse 64   Ht 1.626 m (5' 4\")   Wt 75.3 kg (166 lb)   BMI 28.49 kg/m²     Physical Exam  Vitals reviewed.   Constitutional:       Appearance: Normal appearance.   HENT:      Head: Normocephalic.      Right Ear: External ear normal.      Left Ear: External ear normal.      Nose: Nose normal. No congestion or rhinorrhea.      Mouth/Throat:      Mouth: Mucous membranes are moist.   Eyes:      Extraocular Movements: Extraocular movements intact.      Conjunctiva/sclera: Conjunctivae normal.      Pupils: Pupils are equal, round, and reactive to light.   Cardiovascular:      Rate and Rhythm: Normal rate and regular rhythm.      Pulses: Normal pulses.   Pulmonary:      Effort: Pulmonary effort is normal.      Breath sounds: Normal breath sounds.   Abdominal:      General: Bowel sounds are normal.      Palpations: Abdomen is soft.      Tenderness: There is no abdominal tenderness. There is no right CVA tenderness or left CVA tenderness.   Musculoskeletal:         General: Tenderness present. Normal range of motion.      Cervical back: Normal range of motion and neck supple. No tenderness.   Skin:     General: Skin is warm and dry.   Neurological:      General: No focal deficit present.      Mental Status: She is alert and oriented to " person, place, and time.   Psychiatric:         Mood and Affect: Mood normal.         Behavior: Behavior normal.         Testing   Component      Latest Ref Rn 6/4/2025   WHITE BLOOD CELL COUNT      3.8 - 10.8 Thousand/uL 5.5    RED BLOOD CELL COUNT      3.80 - 5.10 Million/uL 4.36    HEMOGLOBIN      11.7 - 15.5 g/dL 13.3    HEMATOCRIT      35.0 - 45.0 % 42.1    MCV      80.0 - 100.0 fL 96.6    MCH      27.0 - 33.0 pg 30.5    MCHC      32.0 - 36.0 g/dL 31.6 (L)    RDW      11.0 - 15.0 % 13.9    PLATELET COUNT      140 - 400 Thousand/uL 272    MPV      7.5 - 12.5 fL 10.1    ABSOLUTE NEUTROPHILS      1,500 - 7,800 cells/uL 3,768    ABSOLUTE LYMPHOCYTES      850 - 3,900 cells/uL 1,150    ABSOLUTE MONOCYTES      200 - 950 cells/uL 374    ABSOLUTE EOSINOPHILS      15 - 500 cells/uL 171    ABSOLUTE BASOPHILS      0 - 200 cells/uL 39    NEUTROPHILS      % 68.5    LYMPHOCYTES      % 20.9    MONOCYTES      % 6.8    EOSINOPHILS      % 3.1    BASOPHILS      % 0.7    GLUCOSE      65 - 99 mg/dL 88    UREA NITROGEN (BUN)      7 - 25 mg/dL 19    CREATININE      0.50 - 1.05 mg/dL 0.93    EGFR      > OR = 60 mL/min/1.73m2 69    SODIUM      135 - 146 mmol/L 139    POTASSIUM      3.5 - 5.3 mmol/L 4.1    CHLORIDE      98 - 110 mmol/L 104    CARBON DIOXIDE      20 - 32 mmol/L 28    ELECTROLYTE BALANCE      7 - 17 mmol/L (calc) 7    CALCIUM      8.6 - 10.4 mg/dL 9.3    PROTEIN, TOTAL      6.1 - 8.1 g/dL 6.0 (L)    ALBUMIN      3.6 - 5.1 g/dL 3.8    BILIRUBIN, TOTAL      0.2 - 1.2 mg/dL 0.6    ALKALINE PHOSPHATASE      37 - 153 U/L 109    AST      10 - 35 U/L 48 (H)    ALT      6 - 29 U/L 28    CHOLESTEROL, TOTAL      <200 mg/dL 183    HDL CHOLESTEROL      > OR = 50 mg/dL 84    TRIGLYCERIDES      <150 mg/dL 56    LDL-CHOLESTEROL      mg/dL (calc) 85    CHOL/HDLC RATIO      <5.0 (calc) 2.2    NON HDL CHOLESTEROL      <130 mg/dL (calc) 99    IRON, TOTAL      45 - 160 mcg/dL 81    IRON BINDING CAPACITY      250 - 450 mcg/dL (calc) 329    %  SATURATION      16 - 45 % (calc) 25    HEMOGLOBIN A1c      <5.7 % 5.8 (H)    eAG (mg/dL)      mg/dL 120    eAG (mmol/L)      mmol/L 6.6    MAGNESIUM      1.5 - 2.5 mg/dL 2.0    FERRITIN      16 - 288 ng/mL 30    T4, FREE      0.8 - 1.8 ng/dL 1.2    TSH      0.40 - 4.50 mIU/L 2.44    VITAMIN B12      200 - 1,100 pg/mL 377    VITAMIN D,25-OH,TOTAL,IA      30 - 100 ng/mL 39         Impression    MDM    1) COMPLEXITY: MORE THAN 1 STABLE CHRONIC CONDITION ADDRESSED  2)DATA: TESTS INTERPRETED AND OR ORDERED, TOOK INDEPENDENT HISTORY OR RECORDS REVIEWED  3)RISK: MODERATE RISK DUE TO NATURE OF MEDICAL CONDITIONS/COMORBIDITY OR MEDICATIONS ORDERED OR SURGICAL OR PROCEDURE REFERRAL, .       Reviewed labs and Testing on file   Patient to follow diet low in cholesterol, fat, and sodium.    Patient is advised to increase Exercise.  Patient is recommended to lose weight.  Reviewed Meds and discussed common side effects  Continue as directed   Patient is strongly advised to be compliant with recommendations.    Return to Clinic sooner if needed.  Patient denies further questions/concerns at this time     Assessment/Plan   Problem List Items Addressed This Visit           ICD-10-CM    Chronic pain of left knee M25.562, G89.29    Relevant Orders    XR knee left 1-2 views    Diuretic-induced hypokalemia E87.6, T50.2X5A    GERD (gastroesophageal reflux disease) K21.9    Hypercholesteremia E78.00    Relevant Orders    CBC and Auto Differential    Comprehensive Metabolic Panel    Hemoglobin A1C    Lipid Panel    Magnesium    Vitamin D 25-Hydroxy,Total (for eval of Vitamin D levels)    Hypermagnesemia E83.41    Relevant Orders    Magnesium    Hypertension I10    Polyarthralgia M25.50    Chronic bilateral low back pain without sciatica - Primary M54.50, G89.29    Relevant Orders    XR lumbar spine 2-3 views     Other Visit Diagnoses         Codes      Low vitamin B12 level     R79.89      Glucose intolerance (impaired glucose tolerance)      R73.02    Relevant Orders    CBC and Auto Differential    Comprehensive Metabolic Panel    Hemoglobin A1C    Lipid Panel    Magnesium    Vitamin D 25-Hydroxy,Total (for eval of Vitamin D levels)      Osteopenia, unspecified location     M85.80    Relevant Orders    Vitamin D 25-Hydroxy,Total (for eval of Vitamin D levels)      Hypokalemia     E87.6    Relevant Medications    potassium chloride CR (Klor-Con M20) 20 mEq ER tablet      Encounter for screening mammogram for breast cancer     Z12.31    Relevant Orders    BI mammo bilateral screening tomosynthesis          Consider follow up in few weeks with pain check = pt to call if needed    FU in 6 mo with labs at [facility] fasting and med check          [1]   Patient Active Problem List  Diagnosis    Bilateral leg edema    Chronic pain of left knee    CMC arthritis    COPD (chronic obstructive pulmonary disease) (Multi)    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    Diuretic-induced hypokalemia    GERD (gastroesophageal reflux disease)    Hypercholesteremia    Hyperkalemia    Hypermagnesemia    Hypertension    Impingement syndrome of right shoulder    Lung nodule    Medial epicondylitis    Obstructive sleep apnea, adult    Polyarthralgia    SOB (shortness of breath) on exertion    Cervicalgia    Chronic bilateral low back pain without sciatica    Mid back pain    Fatigue    Heart palpitations    Muscle cramp    Degenerative disc disease, cervical    Degenerative disc disease, thoracic    Degenerative disc disease at L5-S1 level    Pelvic pain    Diverticulitis    Vesicosigmoidal fistula    Diverticulosis    Colovesical fistula   [2]   Past Medical History:  Diagnosis Date    Acute exacerbation of chronic bronchitis (Multi) 11/16/2023    Allergic     COPD (chronic obstructive pulmonary disease) (Multi)     Cough 11/16/2023    Delayed emergence from general anesthesia 08/12/2019    Diverticulitis     Diverticulitis of colon 01/15/2022    GERD  (gastroesophageal reflux disease)     Hyperlipidemia     Hypertension     Lung nodule     Pneumonia 1971    Procedure and treatment not carried out because of patient's decision for unspecified reasons 2021    Mammogram declined    Pulmonary hypertension (Multi)     Sleep apnea, obstructive     Wheezing on auscultation 2023   [3]   Past Surgical History:  Procedure Laterality Date    CARDIAC CATHETERIZATION       SECTION, CLASSIC      COLON SURGERY  2024    SIGMOID RESECTION    COLONOSCOPY  2022    REPEAT 5 YRS FOR POLYPS    CT GUIDED ABSCESS FLUID COLLECTION DRAINAGE  2022    CT GUIDED ABSCESS FLUID COLLECTION DRAINAGE 2022 [facility]    HAND SURGERY Left     thumb    OVARIAN CYST REMOVAL     [4]   Family History  Problem Relation Name Age of Onset    Hypertension Mother Anita márquez     Stroke Father      COPD Father      Hypertension Father      Other (PERIPHERAL ARTERIAL DISEASE) Father      Thyroid disease Father      Heart attack Brother tiffanie márquez     Stomach cancer Maternal Grandmother      Miakel's disease Paternal Grandmother     [5]   Social History  Tobacco Use    Smoking status: Former     Current packs/day: 0.00     Average packs/day: 1.5 packs/day for 33.0 years (49.5 ttl pk-yrs)     Types: Cigarettes     Start date: 1978     Quit date: 2011     Years since quittin.4     Passive exposure: Current    Smokeless tobacco: Never   Vaping Use    Vaping status: Never Used   Substance Use Topics    Alcohol use: Never    Drug use: Never   [6]   Allergies  Allergen Reactions    Lisinopril Cough    Sulfa (Sulfonamide Antibiotics) Rash   [7]   Current Outpatient Medications   Medication Sig Dispense Refill    albuterol 90 mcg/actuation inhaler Inhale 2 puffs every 6 hours if needed for shortness of breath.      aspirin 81 mg chewable tablet CHEW AND SWALLOW 1 TABLET BY MOUTH ONCE DAILY 90 tablet 3    atorvastatin (Lipitor) 80 mg tablet  TAKE 1 TABLET (80 MG) BY MOUTH ONCE DAILY. 90 tablet 1    Breztri Aerosphere 160-9-4.8 mcg/actuation HFA aerosol inhaler 2 puffs BID RINSE MOUTH WITH WATER AFTER 32.1 g 1    cholecalciferol (Vitamin D-3) 25 MCG (1000 UT) tablet Take 1 tablet (25 mcg) by mouth once daily.      isosorbide mononitrate ER (Imdur) 30 mg 24 hr tablet Take 1 tablet (30 mg) by mouth once daily. 90 tablet 3    Klor-Con 20 mEq packet DISSOLVE 1 PACKET (20 MEQ) IN FLUID AND DRINK BY MOUTH ONCE DAILY AS DIRECTED 30 packet 11    losartan (Cozaar) 25 mg tablet TAKE 1 TABLET (25 MG) BY MOUTH ONCE DAILY. 90 tablet 3    metoprolol succinate XL (Toprol-XL) 25 mg 24 hr tablet TAKE 1 TABLET (25 MG) BY MOUTH ONCE DAILY. 90 tablet 3    omeprazole OTC (PriLOSEC OTC) 20 mg EC tablet Take 1 tablet (20 mg) by mouth once daily as needed.      triamterene-hydrochlorothiazid (Maxzide-25) 37.5-25 mg tablet TAKE 1 TABLET BY MOUTH ONCE DAILY. 90 tablet 3    acetaminophen (Tylenol) 325 mg tablet Take 2 tablets (650 mg) by mouth every 6 hours if needed for mild pain (1 - 3).      azelastine (Astelin) 137 mcg (0.1 %) nasal spray Administer 2 sprays into each nostril 2 times a day for 15 days. Use in each nostril as directed (Patient not taking: Reported on 9/9/2024) 30 mL 0    L.acid/L.casei/B.bif/B.shereen/FOS (PROBIOTIC BLEND ORAL) Take 1 each by mouth once daily.      lactase (Lactaid) 3,000 unit tablet Take 1 tablet (3,000 Units) by mouth once daily as needed. (Patient not taking: Reported on 1/17/2025)      losartan (Cozaar) 50 mg tablet Take 1 tablet (50 mg) by mouth once daily. 30 tablet 11     No current facility-administered medications for this visit.

## 2025-06-13 ENCOUNTER — HOSPITAL ENCOUNTER (OUTPATIENT)
Dept: RADIOLOGY | Facility: CLINIC | Age: 65
Discharge: HOME | End: 2025-06-13
Payer: COMMERCIAL

## 2025-06-13 DIAGNOSIS — M25.562 CHRONIC PAIN OF LEFT KNEE: ICD-10-CM

## 2025-06-13 DIAGNOSIS — G89.29 CHRONIC BILATERAL LOW BACK PAIN WITHOUT SCIATICA: ICD-10-CM

## 2025-06-13 DIAGNOSIS — M54.50 CHRONIC BILATERAL LOW BACK PAIN WITHOUT SCIATICA: ICD-10-CM

## 2025-06-13 DIAGNOSIS — G89.29 CHRONIC PAIN OF LEFT KNEE: ICD-10-CM

## 2025-06-13 PROCEDURE — 73560 X-RAY EXAM OF KNEE 1 OR 2: CPT | Mod: LT

## 2025-06-13 PROCEDURE — 72100 X-RAY EXAM L-S SPINE 2/3 VWS: CPT

## 2025-06-13 PROCEDURE — 72100 X-RAY EXAM L-S SPINE 2/3 VWS: CPT | Performed by: STUDENT IN AN ORGANIZED HEALTH CARE EDUCATION/TRAINING PROGRAM

## 2025-06-13 PROCEDURE — 73560 X-RAY EXAM OF KNEE 1 OR 2: CPT | Mod: LEFT SIDE | Performed by: STUDENT IN AN ORGANIZED HEALTH CARE EDUCATION/TRAINING PROGRAM

## 2025-06-17 ENCOUNTER — HOSPITAL ENCOUNTER (OUTPATIENT)
Dept: RADIOLOGY | Facility: CLINIC | Age: 65
Discharge: HOME | End: 2025-06-17
Payer: COMMERCIAL

## 2025-06-17 DIAGNOSIS — Z12.31 ENCOUNTER FOR SCREENING MAMMOGRAM FOR BREAST CANCER: ICD-10-CM

## 2025-06-17 PROCEDURE — 77067 SCR MAMMO BI INCL CAD: CPT

## 2025-06-17 PROCEDURE — 77067 SCR MAMMO BI INCL CAD: CPT | Performed by: RADIOLOGY

## 2025-06-17 PROCEDURE — 77063 BREAST TOMOSYNTHESIS BI: CPT | Performed by: RADIOLOGY

## 2025-08-19 DIAGNOSIS — E78.00 HYPERCHOLESTEREMIA: ICD-10-CM

## 2025-08-20 RX ORDER — ATORVASTATIN CALCIUM 80 MG/1
80 TABLET, FILM COATED ORAL DAILY
Qty: 90 TABLET | Refills: 1 | Status: SHIPPED | OUTPATIENT
Start: 2025-08-20

## 2025-08-25 DIAGNOSIS — E87.6 HYPOKALEMIA: ICD-10-CM

## 2025-08-25 RX ORDER — POTASSIUM CHLORIDE 20 MEQ/1
20 TABLET, EXTENDED RELEASE ORAL DAILY
Qty: 90 TABLET | Refills: 1 | Status: SHIPPED | OUTPATIENT
Start: 2025-08-25

## 2025-09-16 ENCOUNTER — APPOINTMENT (OUTPATIENT)
Dept: PULMONOLOGY | Facility: CLINIC | Age: 65
End: 2025-09-16
Payer: COMMERCIAL

## 2025-09-18 ENCOUNTER — APPOINTMENT (OUTPATIENT)
Dept: CARDIOLOGY | Facility: CLINIC | Age: 65
End: 2025-09-18
Payer: COMMERCIAL

## 2025-09-22 ENCOUNTER — APPOINTMENT (OUTPATIENT)
Dept: CARDIOLOGY | Facility: CLINIC | Age: 65
End: 2025-09-22
Payer: COMMERCIAL

## 2025-10-27 ENCOUNTER — APPOINTMENT (OUTPATIENT)
Dept: PULMONOLOGY | Facility: CLINIC | Age: 65
End: 2025-10-27
Payer: COMMERCIAL

## 2025-12-10 ENCOUNTER — APPOINTMENT (OUTPATIENT)
Dept: PRIMARY CARE | Facility: CLINIC | Age: 65
End: 2025-12-10
Payer: COMMERCIAL

## (undated) DEVICE — TIP, SUCTION, YANKAUER, FLEXIBLE

## (undated) DEVICE — DRAPE, INSTRUMENT, W/POUCH, STERI DRAPE, 7 X 11 IN, DISPOSABLE, STERILE

## (undated) DEVICE — GLOVE, SURGICAL, PROTEXIS PI BLUE W/NEUTHERA, 8.0, PF, LF

## (undated) DEVICE — STAPLER, ECHELON 3000, 60MM STD

## (undated) DEVICE — TRAY, SURESTEP, SILICONE DRAINAGE BAG, STATLOCK, 16FR

## (undated) DEVICE — SUTURE, VICRYL, 3-0, 27 IN, SH

## (undated) DEVICE — TROCAR, KII OPTICAL BLADELESS 5MM Z THREAD 100MM LNGTH

## (undated) DEVICE — Device

## (undated) DEVICE — SUTURE, PDS II, 1, 36 IN, CT, VIOLET

## (undated) DEVICE — EVACUATOR, WOUND, SUCTION, CLOSED, JACKSON-PRATT, 100 CC, SILICONE

## (undated) DEVICE — STAPLER,  ECHELON CIRCULAR POWERED, 29MM, DISP

## (undated) DEVICE — STAPLER, SKIN, PLUS, WIDE, 35

## (undated) DEVICE — TROCAR, OPTICAL, BLADELESS, 12MM, THREADED, 100MM LENGTH

## (undated) DEVICE — DEVICE, OMNICLOSE, 10MM

## (undated) DEVICE — GLOVE, SURGICAL, PROTEXIS PI MICRO, 7.5, PF, LF

## (undated) DEVICE — SUTURE, MONOCRYL, 4-0, 27 IN, PS-2, UNDYED

## (undated) DEVICE — LIGASURE, V SEALER/DIVIDER  5MM BLUNT TIP

## (undated) DEVICE — DRAIN, JACKSON-PRATT, 15FR, W/ TROCAR, LF

## (undated) DEVICE — TOWEL PACK, STERILE, 4/PACK, BLUE

## (undated) DEVICE — SLEEVE, SURGICAL, 21.5 X 5.5 IN, LF, STERILE

## (undated) DEVICE — SOLUTION, IRRIGATION, STERILE WATER, 1000 ML, POUR BOTTLE

## (undated) DEVICE — APPLICATOR, CHLORAPREP, W/ORANGE TINT, 26ML

## (undated) DEVICE — STAPLER, LINEAR ENDO RELOAD, 60MM, BLUE, DISP

## (undated) DEVICE — SOLUTION, IRRIGATION, SODIUM CHLORIDE 0.9%, 1000 ML, POUR BOTTLE